# Patient Record
Sex: FEMALE | Race: WHITE | NOT HISPANIC OR LATINO | Employment: OTHER | URBAN - METROPOLITAN AREA
[De-identification: names, ages, dates, MRNs, and addresses within clinical notes are randomized per-mention and may not be internally consistent; named-entity substitution may affect disease eponyms.]

---

## 2017-01-09 ENCOUNTER — PATIENT OUTREACH (OUTPATIENT)
Dept: ADMINISTRATIVE | Facility: HOSPITAL | Age: 72
End: 2017-01-09

## 2017-01-16 ENCOUNTER — TELEPHONE (OUTPATIENT)
Dept: PAIN MEDICINE | Facility: CLINIC | Age: 72
End: 2017-01-16

## 2017-01-16 NOTE — TELEPHONE ENCOUNTER
----- Message from Darlin Newton RT sent at 1/13/2017  9:04 AM CST -----  Contact: Gabrielle (daughter) [phone]   Gabrielle (daughter) [phone], requesting an appt for the pt much sooner than 02/16/2017, referred to by Dr.L. Kline for several spine fx, thanks.

## 2017-01-17 NOTE — TELEPHONE ENCOUNTER
Spoke with patient's daughter. Daughter stated patient has history of compression fractures and is being referred by Dr Kline. Daughter indicated patient last fracture was on 10/16. Verbalized to daughter if patient has increased patient or new fracture to call and staff would fit patient in. Verbalzied understanding. Patient appointment is on 2/16.

## 2017-01-17 NOTE — TELEPHONE ENCOUNTER
----- Message from Apolonia Hayes sent at 1/17/2017  7:58 AM CST -----  Patients daughter Gabrielle is returning Naa's call please call [phone]

## 2017-01-23 ENCOUNTER — OFFICE VISIT (OUTPATIENT)
Dept: FAMILY MEDICINE | Facility: CLINIC | Age: 72
End: 2017-01-23
Payer: MEDICARE

## 2017-01-23 VITALS
WEIGHT: 155.56 LBS | TEMPERATURE: 99 F | HEIGHT: 62 IN | SYSTOLIC BLOOD PRESSURE: 138 MMHG | HEART RATE: 80 BPM | BODY MASS INDEX: 28.63 KG/M2 | DIASTOLIC BLOOD PRESSURE: 84 MMHG

## 2017-01-23 DIAGNOSIS — F41.9 ANXIETY: ICD-10-CM

## 2017-01-23 DIAGNOSIS — R73.03 PREDIABETES: ICD-10-CM

## 2017-01-23 DIAGNOSIS — E55.9 VITAMIN D DEFICIENCY: ICD-10-CM

## 2017-01-23 DIAGNOSIS — Z00.00 ROUTINE HEALTH MAINTENANCE: ICD-10-CM

## 2017-01-23 DIAGNOSIS — S32.029D CLOSED FRACTURE OF SECOND LUMBAR VERTEBRA WITH ROUTINE HEALING, UNSPECIFIED FRACTURE MORPHOLOGY, SUBSEQUENT ENCOUNTER: ICD-10-CM

## 2017-01-23 DIAGNOSIS — J44.9 CHRONIC OBSTRUCTIVE PULMONARY DISEASE, UNSPECIFIED COPD TYPE: Primary | Chronic | ICD-10-CM

## 2017-01-23 PROCEDURE — 99999 PR PBB SHADOW E&M-EST. PATIENT-LVL III: CPT | Mod: PBBFAC,,, | Performed by: FAMILY MEDICINE

## 2017-01-23 PROCEDURE — 1157F ADVNC CARE PLAN IN RCRD: CPT | Mod: S$GLB,,, | Performed by: FAMILY MEDICINE

## 2017-01-23 PROCEDURE — 1160F RVW MEDS BY RX/DR IN RCRD: CPT | Mod: S$GLB,,, | Performed by: FAMILY MEDICINE

## 2017-01-23 PROCEDURE — 1125F AMNT PAIN NOTED PAIN PRSNT: CPT | Mod: S$GLB,,, | Performed by: FAMILY MEDICINE

## 2017-01-23 PROCEDURE — 99214 OFFICE O/P EST MOD 30 MIN: CPT | Mod: S$GLB,,, | Performed by: FAMILY MEDICINE

## 2017-01-23 PROCEDURE — 1159F MED LIST DOCD IN RCRD: CPT | Mod: S$GLB,,, | Performed by: FAMILY MEDICINE

## 2017-01-23 RX ORDER — DIAZEPAM 10 MG/1
10 TABLET ORAL
Qty: 5 TABLET | Refills: 0 | Status: SHIPPED | OUTPATIENT
Start: 2017-01-23 | End: 2017-02-08 | Stop reason: ALTCHOICE

## 2017-01-23 NOTE — PROGRESS NOTES
Subjective:       Patient ID: Aruna Peters is a 71 y.o. female.    Chief Complaint: Follow-up (pt is c/o weight gain. )    HPI   Patient is here today for f/u. She is accompanied to the appt by her daughter, Gabrielle, who provides some interim hx.  Since LOV, patient has gained approx 7#. Gabrielle reports a lot of soda and juice intake, patient denies. Gabrielle notes a lot of snacking, patient denies. We discussed concerns re: prediabetes on last lab draw - due for repeat this spring.  COPD stable by report. Using spiriva daily. She states that she is able to get up and move around as she wants. Still bags the leaves in the yard and takes breaks when needed. Gabrielle notes that she has had pOx vary from 80-92% at home.   Chronic, intermittent LBP with gait and postural change since compression fx at L2 last year. MRI changes were reviewed with patient, who was reticent to even discuss. Adamant that she will not wear a back brace again.    Due for wwe (has not had one in decades) as well as mmg.   Gabrielle can do nuafve71 from home.     Review of Systems   Constitutional: Positive for unexpected weight change (gain). Negative for activity change.   HENT: Negative for hearing loss, rhinorrhea and trouble swallowing.    Eyes: Negative for discharge and visual disturbance.   Respiratory: Positive for wheezing. Negative for cough (occ intermittent) and chest tightness.    Cardiovascular: Positive for chest pain. Negative for palpitations and leg swelling.   Gastrointestinal: Positive for constipation. Negative for abdominal pain, blood in stool, diarrhea and vomiting.   Endocrine: Negative for polydipsia and polyuria.   Genitourinary: Negative for difficulty urinating, dysuria, hematuria and menstrual problem.   Musculoskeletal: Positive for arthralgias and joint swelling.   Neurological: Positive for weakness. Negative for headaches.   Psychiatric/Behavioral: Positive for confusion. Negative for dysphoric mood and  sleep disturbance.       Objective:      Physical Exam   Constitutional: She is oriented to person, place, and time. She appears well-developed and well-nourished. No distress.   HENT:   Head: Normocephalic and atraumatic.   Right Ear: External ear normal.   Left Ear: External ear normal.   Nose: Nose normal.   Eyes: Conjunctivae and EOM are normal.   Neck: Neck supple.   Cardiovascular: Normal rate and regular rhythm.    Pulmonary/Chest: Effort normal. No respiratory distress. She has no wheezes. She has no rales.   distant   Abdominal: She exhibits no distension.   Musculoskeletal: Normal range of motion.   Neurological: She is alert and oriented to person, place, and time. No cranial nerve deficit.   Nl gait   Skin: Skin is warm and dry. No erythema.   Psychiatric: Her speech is normal. Her affect is not angry. She is agitated. She is not aggressive, not slowed and not combative. Cognition and memory are impaired.   Nursing note and vitals reviewed.        Chronic obstructive pulmonary disease, unspecified COPD type  Stable, cont spiriva.  Closed fracture of second lumbar vertebra with routine healing, unspecified fracture morphology, subsequent encounter  F/u with Cal for resultant sciatica.  Routine health maintenance  -     Ambulatory referral to Obstetrics / Gynecology  Update as able.  Anxiety  Side effects and precautions of medication use reviewed with patient, expressed understanding. No questions or concerns.   -     diazePAM (VALIUM) 10 MG Tab; Take 1 tablet (10 mg total) by mouth On call Procedure.  Dispense: 5 tablet; Refill: 0

## 2017-01-23 NOTE — MR AVS SNAPSHOT
[facility]  [address]  [place] [address]  Phone: [phone]  Fax: [phone]                  Aruna Peters   2017 8:20 AM   Office Visit    Description:  Female : 1945   Provider:  Nuzhat Kline MD   Department:  [facility]           Reason for Visit     Follow-up           Diagnoses this Visit        Comments    Chronic obstructive pulmonary disease, unspecified COPD type    -  Primary     Closed fracture of second lumbar vertebra with routine healing, unspecified fracture morphology, subsequent encounter         Routine health maintenance         Anxiety         Vitamin D deficiency         Prediabetes                To Do List           Future Appointments        Provider Department Dept Phone    2017 10:00 AM Jacob Herron MD [facility] - [phone]    2017 8:30 AM Casey Mccall MD [place] - Pain Management [phone]    2017 9:45 AM [facility] MAMMO1 [facility] [phone]      Goals (5 Years of Data)     None      Follow-Up and Disposition     Return in about 4 months (around 2017), or if symptoms worsen or fail to improve, for recheck.    Follow-up and Disposition History       These Medications        Disp Refills Start End    diazePAM (VALIUM) 10 MG Tab 5 tablet 0 2017    Take 1 tablet (10 mg total) by mouth On call Procedure. - Oral    Pharmacy: [facility] [address] [place] [address] AT [facility] Ph #: [phone]         [facility] On Call     [facility] On Call Nurse Care Line -  Assistance  Registered nurses in the [facility] On Call Center provide clinical advisement, health education, appointment booking, and other advisory services.  Call for this free service at [phone].             Medications           Message regarding Medications     Verify the changes and/or additions to your  "medication regime listed below are the same as discussed with your clinician today.  If any of these changes or additions are incorrect, please notify your healthcare provider.        START taking these NEW medications        Refills    diazePAM (VALIUM) 10 MG Tab 0    Sig: Take 1 tablet (10 mg total) by mouth On call Procedure.    Class: Normal    Route: Oral           Verify that the below list of medications is an accurate representation of the medications you are currently taking.  If none reported, the list may be blank. If incorrect, please contact your healthcare provider. Carry this list with you in case of emergency.           Current Medications     acetaminophen (TYLENOL) 500 MG tablet Take 500 mg by mouth every 6 (six) hours as needed for Pain.    albuterol (PROAIR HFA) 90 mcg/actuation inhaler Inhale 2 puffs into the lungs every 6 (six) hours as needed for Wheezing.    docusate sodium (COLACE) 50 MG capsule Take 100 mg by mouth once daily.    escitalopram oxalate (LEXAPRO) 20 MG tablet TAKE 1 TABLET(20 MG) BY MOUTH EVERY EVENING    ibuprofen (ADVIL,MOTRIN) 100 mg/5 mL suspension Take by mouth every 6 (six) hours as needed.    nortriptyline (PAMELOR) 25 MG capsule TAKE 1 CAPSULE(25 MG) BY MOUTH EVERY EVENING    tiotropium bromide (SPIRIVA RESPIMAT) 2.5 mcg/actuation Mist Inhale into the lungs.    diazePAM (VALIUM) 10 MG Tab Take 1 tablet (10 mg total) by mouth On call Procedure.           Clinical Reference Information           Vital Signs - Last Recorded  Most recent update: 1/23/2017  8:35 AM by Lynda Krause LPN    BP Pulse Temp Ht Wt BMI    138/84 80 98.6 °F (37 °C) 5' 2" (1.575 m) 70.5 kg (155 lb 8.6 oz) 28.45 kg/m2      Blood Pressure          Most Recent Value    BP  138/84      Allergies as of 1/23/2017     Iodine And Iodide Containing Products      Immunizations Administered on Date of Encounter - 1/23/2017     None      Orders Placed During Today's Visit      Normal Orders This Visit    " Ambulatory referral to Obstetrics / Gynecology     Future Labs/Procedures Expected by Expires    Comprehensive metabolic panel  1/23/2017 3/24/2018    Hemoglobin A1c  1/23/2017 3/24/2018    TSH  1/23/2017 3/24/2018    Vitamin D  1/23/2017 3/24/2018

## 2017-02-08 ENCOUNTER — OFFICE VISIT (OUTPATIENT)
Dept: OBSTETRICS AND GYNECOLOGY | Facility: CLINIC | Age: 72
End: 2017-02-08
Payer: MEDICARE

## 2017-02-08 VITALS
BODY MASS INDEX: 28.76 KG/M2 | SYSTOLIC BLOOD PRESSURE: 180 MMHG | HEART RATE: 80 BPM | WEIGHT: 156.31 LBS | RESPIRATION RATE: 21 BRPM | DIASTOLIC BLOOD PRESSURE: 90 MMHG | HEIGHT: 62 IN

## 2017-02-08 DIAGNOSIS — Z12.72 NORMAL VAGINAL PAPANICOLAOU SMEAR IN PATIENT WITH HISTORY OF HYSTERECTOMY: Primary | ICD-10-CM

## 2017-02-08 DIAGNOSIS — Z90.710 NORMAL VAGINAL PAPANICOLAOU SMEAR IN PATIENT WITH HISTORY OF HYSTERECTOMY: Primary | ICD-10-CM

## 2017-02-08 PROCEDURE — G0101 CA SCREEN;PELVIC/BREAST EXAM: HCPCS | Mod: S$GLB,,, | Performed by: SPECIALIST

## 2017-02-08 PROCEDURE — 88175 CYTOPATH C/V AUTO FLUID REDO: CPT

## 2017-02-08 PROCEDURE — 99999 PR PBB SHADOW E&M-EST. PATIENT-LVL III: CPT | Mod: PBBFAC,,, | Performed by: SPECIALIST

## 2017-02-08 NOTE — PROGRESS NOTES
72 yo WF presents for annual gyn evaluation. No overt gyn complaints  Past Medical History   Diagnosis Date    Back pain        History reviewed. No pertinent past surgical history.    Family History   Problem Relation Age of Onset    No Known Problems Son        Social History     Social History    Marital status:      Spouse name: N/A    Number of children: N/A    Years of education: N/A     Social History Main Topics    Smoking status: Former Smoker     Packs/day: 1.00    Smokeless tobacco: Never Used    Alcohol use 0.0 oz/week     0 Standard drinks or equivalent per week      Comment: Social    Drug use: None    Sexual activity: Not Asked     Other Topics Concern    None     Social History Narrative    Originally from [place].       Current Outpatient Prescriptions   Medication Sig Dispense Refill    acetaminophen (TYLENOL) 500 MG tablet Take 500 mg by mouth every 6 (six) hours as needed for Pain.      albuterol (PROAIR HFA) 90 mcg/actuation inhaler Inhale 2 puffs into the lungs every 6 (six) hours as needed for Wheezing.      docusate sodium (COLACE) 50 MG capsule Take 100 mg by mouth once daily.      escitalopram oxalate (LEXAPRO) 20 MG tablet TAKE 1 TABLET(20 MG) BY MOUTH EVERY EVENING 30 tablet 0    nortriptyline (PAMELOR) 25 MG capsule TAKE 1 CAPSULE(25 MG) BY MOUTH EVERY EVENING 90 capsule 1    tiotropium bromide (SPIRIVA RESPIMAT) 2.5 mcg/actuation Mist Inhale into the lungs.       No current facility-administered medications for this visit.        Review of patient's allergies indicates:   Allergen Reactions    Iodine and iodide containing products        Review of System:   General: no chills, fever, night sweats, weight gain or weight loss  Psychological: no depression or suicidal ideation  Breasts: no new or changing breast lumps, nipple discharge or masses.  Respiratory: no cough, shortness of breath, or wheezing  Cardiovascular: no chest pain or dyspnea on  exertion  Gastrointestinal: no abdominal pain, change in bowel habits, or black or bloody stools  Genito-Urinary: no incontinence, urinary frequency/urgency or vulvar/vaginal symptoms, pelvic pain or abnormal vaginal bleeding.  Musculoskeletal: no gait disturbance or muscular weakness    PE:   APPEARANCE: Well nourished, well developed, in no acute distress.  NECK: Neck symmetric without masses or thyromegaly.  NODES: No inguinal lymph node enlargement.  ABDOMEN: Soft. No tenderness or masses. No hepatosplenomegaly. No hernias.  BREASTS: Symmetrical, no skin changes or visible lesions. No palpable masses, nipple discharge or adenopathy bilaterally.  PELVIC: Normal external female genitalia without lesions. Normal hair distribution. Adequate perineal body, normal urethral meatus. Vagina moist and well rugated without lesions or discharge. No significant cystocele or rectocele. Uterus and cervix surgically absent. Bimanual exam revealed no masses, tenderness or abnormality.      PAP submitted    Plan BSE monthly   Mammo completed  RTO 2 years/prn

## 2017-02-08 NOTE — MR AVS SNAPSHOT
[facility]  [address]  [facility] [address]  Phone: [phone]                  Luda Peters   2017 8:40 AM   Office Visit    Description:  Female : 1945   Provider:  Clarence Christensen MD   Department:  [facility]           Reason for Visit     Well Woman     Routine Gynecological exam           Diagnoses this Visit        Comments    Normal vaginal Papanicolaou smear in patient with history of hysterectomy    -  Primary            To Do List           Future Appointments        Provider Department Dept Phone    2017 8:30 AM Casey Mccall MD [place] - Pain Management [phone]    2017 9:45 AM [facility] MAMMO1 [facility] [phone]      Goals (5 Years of Data)     None      [facility] On Call     [facility] On Call Nurse Care Line -  Assistance  Registered nurses in the [facility] On Call Center provide clinical advisement, health education, appointment booking, and other advisory services.  Call for this free service at [phone].             Medications           Message regarding Medications     Verify the changes and/or additions to your medication regime listed below are the same as discussed with your clinician today.  If any of these changes or additions are incorrect, please notify your healthcare provider.        STOP taking these medications     diazePAM (VALIUM) 10 MG Tab Take 1 tablet (10 mg total) by mouth On call Procedure.    ibuprofen (ADVIL,MOTRIN) 100 mg/5 mL suspension Take by mouth every 6 (six) hours as needed.           Verify that the below list of medications is an accurate representation of the medications you are currently taking.  If none reported, the list may be blank. If incorrect, please contact your healthcare provider. Carry this list with you in case of emergency.           Current Medications     acetaminophen (TYLENOL) 500 MG tablet Take 500 mg by mouth every 6 (six) hours as needed  "for Pain.    albuterol (PROAIR HFA) 90 mcg/actuation inhaler Inhale 2 puffs into the lungs every 6 (six) hours as needed for Wheezing.    docusate sodium (COLACE) 50 MG capsule Take 100 mg by mouth once daily.    escitalopram oxalate (LEXAPRO) 20 MG tablet TAKE 1 TABLET(20 MG) BY MOUTH EVERY EVENING    nortriptyline (PAMELOR) 25 MG capsule TAKE 1 CAPSULE(25 MG) BY MOUTH EVERY EVENING    tiotropium bromide (SPIRIVA RESPIMAT) 2.5 mcg/actuation Mist Inhale into the lungs.           Clinical Reference Information           Your Vitals Were     BP Pulse Resp Height Weight BMI    180/90 (BP Location: Right arm, Patient Position: Sitting, BP Method: Manual) 80 21 5' 2" (1.575 m) 70.9 kg (156 lb 4.9 oz) 28.59 kg/m2      Blood Pressure          Most Recent Value    BP  (!)  180/90 [Large cuff]      Allergies as of 2/8/2017     Iodine And Iodide Containing Products      Immunizations Administered on Date of Encounter - 2/8/2017     None      Orders Placed During Today's Visit      Normal Orders This Visit    Liquid-based pap smear, screening       Language Assistance Services     ATTENTION: Language assistance services are available, free of charge. Please call [phone].      ATENCIÓN: Si habla allison, tiene a lyons disposición servicios gratuitos de asistencia lingüística. Llame al [phone].     ROCK Ý: N?u b?n nói Ti?ng Vi?t, có các d?ch v? h? tr? ngôn ng? mi?n phí dành cho b?n. G?i s? [phone].         [facility] complies with applicable Federal civil rights laws and does not discriminate on the basis of race, color, national origin, age, disability, or sex.        "

## 2017-02-08 NOTE — LETTER
February 8, 2017      Nuzhat Kline MD  [address]  [place] [address]           [facility]  [address] [facility] [place] [address]  Phone: [phone]          Patient: Luda Peters   MR Number: [id number]   YOB: 1945   Date of Visit: 2/8/2017       Dear Dr. Nuzhat Kline:    Thank you for referring Luda Peters to me for evaluation. Attached you will find relevant portions of my assessment and plan of care.    If you have questions, please do not hesitate to call me. I look forward to following Luda Peters along with you.    Sincerely,    Clarence Christensen MD    Enclosure  CC:  No Recipients    If you would like to receive this communication electronically, please contact [email or web address] or [phone] to request more information on [employer] access.    For providers and/or their staff who would like to refer a patient to [facility], please contact us through our one-stop-shop provider referral line, [facility], at [phone].    If you feel you have received this communication in error or would no longer like to receive these types of communications, please e-mail [email or web address]

## 2017-02-16 ENCOUNTER — HOSPITAL ENCOUNTER (OUTPATIENT)
Dept: RADIOLOGY | Facility: HOSPITAL | Age: 72
Discharge: HOME | End: 2017-02-16
Attending: ANESTHESIOLOGY
Payer: MEDICARE

## 2017-02-16 ENCOUNTER — OFFICE VISIT (OUTPATIENT)
Dept: PAIN MEDICINE | Facility: CLINIC | Age: 72
End: 2017-02-16
Payer: MEDICARE

## 2017-02-16 ENCOUNTER — HOSPITAL ENCOUNTER (OUTPATIENT)
Dept: RADIOLOGY | Facility: HOSPITAL | Age: 72
Discharge: HOME | End: 2017-02-16
Attending: INTERNAL MEDICINE
Payer: MEDICARE

## 2017-02-16 ENCOUNTER — PATIENT MESSAGE (OUTPATIENT)
Dept: FAMILY MEDICINE | Facility: CLINIC | Age: 72
End: 2017-02-16

## 2017-02-16 ENCOUNTER — TELEPHONE (OUTPATIENT)
Dept: PAIN MEDICINE | Facility: CLINIC | Age: 72
End: 2017-02-16

## 2017-02-16 VITALS
SYSTOLIC BLOOD PRESSURE: 140 MMHG | DIASTOLIC BLOOD PRESSURE: 80 MMHG | TEMPERATURE: 98 F | HEIGHT: 63 IN | WEIGHT: 156.5 LBS | HEART RATE: 80 BPM | BODY MASS INDEX: 27.73 KG/M2 | RESPIRATION RATE: 20 BRPM

## 2017-02-16 DIAGNOSIS — M25.552 PAIN OF BOTH HIP JOINTS: Primary | ICD-10-CM

## 2017-02-16 DIAGNOSIS — M48.061 LUMBAR STENOSIS: ICD-10-CM

## 2017-02-16 DIAGNOSIS — M25.551 PAIN OF BOTH HIP JOINTS: ICD-10-CM

## 2017-02-16 DIAGNOSIS — M25.552 PAIN OF BOTH HIP JOINTS: ICD-10-CM

## 2017-02-16 DIAGNOSIS — Z87.81 HISTORY OF VERTEBRAL COMPRESSION FRACTURE: ICD-10-CM

## 2017-02-16 DIAGNOSIS — M51.36 DDD (DEGENERATIVE DISC DISEASE), LUMBAR: ICD-10-CM

## 2017-02-16 DIAGNOSIS — Z12.31 VISIT FOR SCREENING MAMMOGRAM: ICD-10-CM

## 2017-02-16 DIAGNOSIS — M25.551 PAIN OF BOTH HIP JOINTS: Primary | ICD-10-CM

## 2017-02-16 DIAGNOSIS — Z12.31 OTHER SCREENING MAMMOGRAM: ICD-10-CM

## 2017-02-16 PROCEDURE — 1125F AMNT PAIN NOTED PAIN PRSNT: CPT | Mod: S$GLB,,, | Performed by: ANESTHESIOLOGY

## 2017-02-16 PROCEDURE — 1160F RVW MEDS BY RX/DR IN RCRD: CPT | Mod: S$GLB,,, | Performed by: ANESTHESIOLOGY

## 2017-02-16 PROCEDURE — 77067 SCR MAMMO BI INCL CAD: CPT | Mod: TC

## 2017-02-16 PROCEDURE — 77063 BREAST TOMOSYNTHESIS BI: CPT | Mod: 26,,, | Performed by: RADIOLOGY

## 2017-02-16 PROCEDURE — 77067 SCR MAMMO BI INCL CAD: CPT | Mod: 26,,, | Performed by: RADIOLOGY

## 2017-02-16 PROCEDURE — 73521 X-RAY EXAM HIPS BI 2 VIEWS: CPT | Mod: TC,PO

## 2017-02-16 PROCEDURE — 1159F MED LIST DOCD IN RCRD: CPT | Mod: S$GLB,,, | Performed by: ANESTHESIOLOGY

## 2017-02-16 PROCEDURE — 1157F ADVNC CARE PLAN IN RCRD: CPT | Mod: S$GLB,,, | Performed by: ANESTHESIOLOGY

## 2017-02-16 PROCEDURE — 99204 OFFICE O/P NEW MOD 45 MIN: CPT | Mod: S$GLB,,, | Performed by: ANESTHESIOLOGY

## 2017-02-16 PROCEDURE — 99999 PR PBB SHADOW E&M-EST. PATIENT-LVL III: CPT | Mod: PBBFAC,,, | Performed by: ANESTHESIOLOGY

## 2017-02-16 PROCEDURE — 73521 X-RAY EXAM HIPS BI 2 VIEWS: CPT | Mod: 26,,, | Performed by: RADIOLOGY

## 2017-02-16 NOTE — PROGRESS NOTES
This note was completed with dictation software and grammatical errors may exist.    CC: Right leg pain     HPI: The patient is a 71-year-old woman with a history of COPD, multiple lumbar compression fractures, anxiety who presents in referral from Dr. Kline for right leg pain.  The patient states that she has had back pain and right leg pain for about 2 years but seems to be worsening recently.  She is a poor historian, her daughter is present with her today to help with the history.  She seems to state that the pain is in the right thigh, right groin, right buttock but also reports pain radiating all the way down the leg to the foot.  It is worse with walking, worse in the morning, worse at night and worse with getting out of a bed or a chair.  She does get some relief with sitting down, rest and medications.  She is taking Advil and Tylenol with mild relief.  She denies any alyce weakness, no bowel or bladder incontinence.      ROS: She reports fatigability, skin color change, vision change, hoarse voice, chest pain, wheezing and cough, constipation, easy bruising, joint stiffness, joint swelling, back pain, memory loss, difficulty sleeping, anxiety and loss of balance.  Balance of review of systems is negative.    Past Medical History   Diagnosis Date    Back pain        No past surgical history on file.    Social History     Social History    Marital status:      Spouse name: N/A    Number of children: N/A    Years of education: N/A     Social History Main Topics    Smoking status: Former Smoker     Packs/day: 1.00    Smokeless tobacco: Never Used    Alcohol use 0.0 oz/week     0 Standard drinks or equivalent per week      Comment: Social    Drug use: None    Sexual activity: Not Asked     Other Topics Concern    None     Social History Narrative    Originally from [place].         Medications/Allergies: See med card    Vitals:    02/16/17 0832   BP: (!) 140/80   Pulse: 80   Resp: 20   Temp:  "97.5 °F (36.4 °C)   TempSrc: Oral   Weight: 71 kg (156 lb 8 oz)   Height: 5' 3" (1.6 m)   PainSc:   6   PainLoc: Back         Physical exam:  Gen: A and O x3, pleasant, well-groomed  Skin: No rashes or obvious lesions  HEENT: PERRLA, no obvious deformities on ears or in canals. Trachea midline.  CVS: Regular rate and rhythm, normal palpable pulses.  Resp:No increased work of breathing, symmetrical chest rise.  Abdomen: Soft, NT/ND.    Musculoskeletal: Antalgic gait secondary to right leg pain, needs help with walking.  Neuro:  Lower extremities: 5/5 strength bilaterally, except for 4/5 hip flexion right side  Reflexes: Patellar 3+, Achilles 2+ bilaterally.  Sensory:  Intact and symmetrical to light touch and pinprick in L2-S1 dermatomes bilaterally.    Lumbar spine:  Lumbar spine: Range of motion is moderately reduced with both flexion and extension with increased pain in the low back and right buttock with oblique extension.  Jona's test causes no increased pain on either side.    Supine straight leg raise is negative bilaterally.    Internal and external rotation of the hip causes severe right hip, buttock and groin pain.    Myofascial exam: No tenderness to palpation across lumbar paraspinous muscles.    Imaging:  10/13/16 MRI L-spine  1.  Healing relatively severe superior endplate compression fracture of the L2 vertebral body with retropulsion of the posterior vertebral body cortex.  2.  L1-2 severe spinal stenosis from a combination of the L2 vertebral body compression fracture, mild disc bulge, and degenerative facet arthrosis.  3.  L2-3 minimal disc bulge and mild degenerative facet arthrosis.  4.  L3-4 minimal disc bulge.  There is mild anterior L3 subluxation due to degenerative facet arthrosis.  5.  L4-5 moderate spinal stenosis due to degenerative facet arthrosis.  6.  L5-S1 degenerative disc disease and degenerative facet arthrosis.  There is a cystic nodule anterior to the left L5-S1 facet joint " which is most likely a synovial cyst and less likely a meningeal cyst.  The cystic nodule impinges the left-sided nerve roots within the left lateral recess of S1.  7.  Old moderate superior endplate compression fracture of the T11 vertebral body.    9/26/16 Xray L-spine  There is lower thoracic and upper lumbar scoliosis convex to the right.  The scoliotic angle measures 5 degrees from the superior endplate of T12 to the inferior endplate of L4.  There is a superior endplate compression fracture of the T11 vertebral body resulting in a wedge deformity and approximately 30 % loss of vertebral body height.  There is a relatively severe superior endplate compression fracture of the L2 vertebral body resulting in a wedge deformity and approximately 70 % loss of vertebral body height.  There is a superior endplate compression fracture of the L4 vertebral body resulting in a wedge deformity and approximately 30 % loss of vertebral body height.  There is degenerative facet arthrosis at the L5-S1 level resulting in grade 2 anterolisthesis of L5 over distance of approximately 8 mm.  There is degenerative disc disease at L5-S1 level with disc space narrowing.    Assessment:  The patient is a 71-year-old woman with a history of COPD, multiple lumbar compression fractures, anxiety who presents in referral from Dr. Kline for right leg pain.   1. Pain of both hip joints  X-Ray Hips Bilateral 2 View Inc AP Pelvis   2. History of vertebral compression fracture     3. Lumbar stenosis     4. DDD (degenerative disc disease), lumbar           Plan:  1.  While she does have some spinal stenosis at L1/2 and L4/5, the majority of her pain in the right leg seemed to be located in the hip and anterior thigh with severe pain on range of motion of the hip.  I'm going to get an x-ray of the hip and if it looks normal her pain may be coming from ED L1/2 or L4/5 levels.  If she has degenerative hip arthritis I'm going to get her set up with a  hip injection and have her follow-up in several weeks afterwards.      Thank you for referring this interesting patient, and I look forward to continuing to collaborate in her care.

## 2017-02-16 NOTE — TELEPHONE ENCOUNTER
Spoke with Gabrielle patient's daughter. Informed daughter of patient's X-ray results. Verbalized understanding. Order faxed to radiology.

## 2017-02-16 NOTE — TELEPHONE ENCOUNTER
Please of the patient know that I have reviewed her hip x-ray that shows severe degenerative arthritis in her right hip where as the left hip looks totally normal.  I think this is causing her current right leg pain and I'm going to get her set up with a right hip injection.  The radiology Department will call her to get her set up in the next week or so.

## 2017-03-20 RX ORDER — ESCITALOPRAM OXALATE 10 MG/1
TABLET ORAL
Qty: 90 TABLET | Refills: 0 | Status: SHIPPED | OUTPATIENT
Start: 2017-03-20 | End: 2018-06-07

## 2017-03-21 RX ORDER — ESCITALOPRAM OXALATE 20 MG/1
TABLET ORAL
Qty: 90 TABLET | Refills: 0 | Status: SHIPPED | OUTPATIENT
Start: 2017-03-21 | End: 2017-08-02 | Stop reason: SDUPTHER

## 2017-05-22 RX ORDER — NORTRIPTYLINE HYDROCHLORIDE 25 MG/1
CAPSULE ORAL
Qty: 90 CAPSULE | Refills: 0 | Status: SHIPPED | OUTPATIENT
Start: 2017-05-22 | End: 2017-08-17 | Stop reason: SDUPTHER

## 2017-08-02 RX ORDER — ESCITALOPRAM OXALATE 20 MG/1
TABLET ORAL
Qty: 90 TABLET | Refills: 0 | Status: SHIPPED | OUTPATIENT
Start: 2017-08-02 | End: 2017-11-04 | Stop reason: SDUPTHER

## 2017-08-02 RX ORDER — ESCITALOPRAM OXALATE 10 MG/1
TABLET ORAL
Qty: 90 TABLET | Refills: 0 | OUTPATIENT
Start: 2017-08-02

## 2017-08-17 RX ORDER — NORTRIPTYLINE HYDROCHLORIDE 25 MG/1
CAPSULE ORAL
Qty: 90 CAPSULE | Refills: 0 | Status: SHIPPED | OUTPATIENT
Start: 2017-08-17 | End: 2017-11-21 | Stop reason: SDUPTHER

## 2017-08-18 DIAGNOSIS — Z12.11 COLON CANCER SCREENING: ICD-10-CM

## 2017-11-06 RX ORDER — ESCITALOPRAM OXALATE 20 MG/1
TABLET ORAL
Qty: 90 TABLET | Refills: 1 | Status: SHIPPED | OUTPATIENT
Start: 2017-11-06 | End: 2018-05-11 | Stop reason: SDUPTHER

## 2017-11-21 RX ORDER — NORTRIPTYLINE HYDROCHLORIDE 25 MG/1
CAPSULE ORAL
Qty: 90 CAPSULE | Refills: 0 | Status: SHIPPED | OUTPATIENT
Start: 2017-11-21 | End: 2018-02-01 | Stop reason: SDUPTHER

## 2018-02-01 RX ORDER — NORTRIPTYLINE HYDROCHLORIDE 25 MG/1
CAPSULE ORAL
Qty: 30 CAPSULE | Refills: 0 | Status: SHIPPED | OUTPATIENT
Start: 2018-02-01 | End: 2018-03-09 | Stop reason: SDUPTHER

## 2018-03-09 DIAGNOSIS — Z00.00 ROUTINE HEALTH MAINTENANCE: Primary | ICD-10-CM

## 2018-03-09 DIAGNOSIS — R73.03 PREDIABETES: ICD-10-CM

## 2018-03-09 DIAGNOSIS — J44.9 CHRONIC OBSTRUCTIVE PULMONARY DISEASE, UNSPECIFIED COPD TYPE: ICD-10-CM

## 2018-03-12 RX ORDER — NORTRIPTYLINE HYDROCHLORIDE 25 MG/1
CAPSULE ORAL
Qty: 90 CAPSULE | Refills: 0 | Status: SHIPPED | OUTPATIENT
Start: 2018-03-12 | End: 2018-07-05 | Stop reason: SDUPTHER

## 2018-03-12 RX ORDER — NORTRIPTYLINE HYDROCHLORIDE 25 MG/1
CAPSULE ORAL
Qty: 30 CAPSULE | Refills: 0 | Status: SHIPPED | OUTPATIENT
Start: 2018-03-12 | End: 2018-04-08 | Stop reason: SDUPTHER

## 2018-03-13 ENCOUNTER — CLINICAL SUPPORT (OUTPATIENT)
Dept: FAMILY MEDICINE | Facility: CLINIC | Age: 73
End: 2018-03-13
Payer: MEDICARE

## 2018-03-13 DIAGNOSIS — Z12.11 COLON CANCER SCREENING: ICD-10-CM

## 2018-03-13 PROCEDURE — 99999 PR PBB SHADOW E&M-EST. PATIENT-LVL I: CPT | Mod: PBBFAC,,,

## 2018-03-13 NOTE — TELEPHONE ENCOUNTER
Spoke with Gabrielle(daughter) would like to  stool test, will call back after stool test is done to schedule appt, with labs.

## 2018-03-13 NOTE — TELEPHONE ENCOUNTER
----- Message from Lidya Muhammad sent at 3/13/2018  2:32 PM CDT -----  Contact: self  Patient retuning call to Delaney   Please call her back     Thanks

## 2018-03-13 NOTE — TELEPHONE ENCOUNTER
Attempted to contact pt due to med refill, refill sent to pharmacy, pt is due for follow up appt with labs      No answer, left messg. Pt to contact office to schedule appt.

## 2018-03-21 ENCOUNTER — LAB VISIT (OUTPATIENT)
Dept: LAB | Facility: HOSPITAL | Age: 73
End: 2018-03-21
Attending: FAMILY MEDICINE
Payer: MEDICARE

## 2018-03-21 DIAGNOSIS — R73.03 PREDIABETES: ICD-10-CM

## 2018-03-21 DIAGNOSIS — Z00.00 ROUTINE HEALTH MAINTENANCE: ICD-10-CM

## 2018-03-21 LAB
25(OH)D3+25(OH)D2 SERPL-MCNC: 27 NG/ML
ALBUMIN SERPL BCP-MCNC: 3.6 G/DL
ALP SERPL-CCNC: 77 U/L
ALT SERPL W/O P-5'-P-CCNC: 9 U/L
ANION GAP SERPL CALC-SCNC: 10 MMOL/L
AST SERPL-CCNC: 17 U/L
BILIRUB SERPL-MCNC: 0.3 MG/DL
BUN SERPL-MCNC: 14 MG/DL
CALCIUM SERPL-MCNC: 9.6 MG/DL
CHLORIDE SERPL-SCNC: 99 MMOL/L
CO2 SERPL-SCNC: 29 MMOL/L
CREAT SERPL-MCNC: 0.7 MG/DL
EST. GFR  (AFRICAN AMERICAN): >60 ML/MIN/1.73 M^2
EST. GFR  (NON AFRICAN AMERICAN): >60 ML/MIN/1.73 M^2
ESTIMATED AVG GLUCOSE: 117 MG/DL
GLUCOSE SERPL-MCNC: 97 MG/DL
HBA1C MFR BLD HPLC: 5.7 %
POTASSIUM SERPL-SCNC: 4 MMOL/L
PROT SERPL-MCNC: 7.5 G/DL
SODIUM SERPL-SCNC: 138 MMOL/L
TSH SERPL DL<=0.005 MIU/L-ACNC: 1.29 UIU/ML

## 2018-03-21 PROCEDURE — 80053 COMPREHEN METABOLIC PANEL: CPT

## 2018-03-21 PROCEDURE — 82306 VITAMIN D 25 HYDROXY: CPT

## 2018-03-21 PROCEDURE — 83036 HEMOGLOBIN GLYCOSYLATED A1C: CPT

## 2018-03-21 PROCEDURE — 36415 COLL VENOUS BLD VENIPUNCTURE: CPT | Mod: PO

## 2018-03-21 PROCEDURE — 84443 ASSAY THYROID STIM HORMONE: CPT

## 2018-04-08 ENCOUNTER — NURSE TRIAGE (OUTPATIENT)
Dept: ADMINISTRATIVE | Facility: CLINIC | Age: 73
End: 2018-04-08

## 2018-04-08 NOTE — TELEPHONE ENCOUNTER
"  Reason for Disposition   Caller requesting a refill, no triage required, and triager able to refill per unit policy    Answer Assessment - Initial Assessment Questions  1. SYMPTOMS: "Do you have any symptoms?"      *No Answer*  2. SEVERITY: If symptoms are present, ask "Are they mild, moderate or severe?"      *No Answer*    Protocols used: ST MEDICATION QUESTION CALL-A-AH    "

## 2018-04-13 RX ORDER — NORTRIPTYLINE HYDROCHLORIDE 25 MG/1
CAPSULE ORAL
Qty: 30 CAPSULE | Refills: 1 | Status: SHIPPED | OUTPATIENT
Start: 2018-04-13 | End: 2019-03-11 | Stop reason: SDUPTHER

## 2018-05-14 RX ORDER — ESCITALOPRAM OXALATE 20 MG/1
20 TABLET ORAL NIGHTLY
Qty: 30 TABLET | Refills: 0 | Status: SHIPPED | OUTPATIENT
Start: 2018-05-14 | End: 2018-12-13 | Stop reason: SDUPTHER

## 2018-05-24 ENCOUNTER — PATIENT OUTREACH (OUTPATIENT)
Dept: ADMINISTRATIVE | Facility: HOSPITAL | Age: 73
End: 2018-05-24

## 2018-05-24 NOTE — LETTER
June 1, 2018    Luda Peters  [address] [place] [address]             [facility]  [address]  [facility] [address]  Phone: [phone] Dear Mrs. Peters:    We have tried to reach you by mychart unsuccessfully.      [facility] is committed to your overall health.  To help you get the most out of each of your visits, we will review your information to make sure you are up to date on all of your recommended tests and or procedures.       Dr. Nuzhat Kline MD has found that you may be due for:     Fasting cholesterol labs   Tetanus, shingles and pneumonia vaccine   Colon cancer screening     If you have had any of the above done at another facility, please bring the records or information with you so that your record at [facility] will be complete and up to date.     If you have not had any of these tests or procedures done recently and would like to complete this testing before your appointment on 6/7/18 at 8:20 am with Nuzhat Kline MD please call [phone] or send a message through your [facility] portal to your provider's office.     If you are currently taking medication, please bring it with you to your appointment for review.     Also, if you have any type of Advanced Directives, please bring them with you to your office visit so we may scan them into your chart.     Please feel free to call the number listed below if you have any questions.     Thanks,     Anthony Fitzgerald LPN Clinical Care Coordinator   [facility] [address] [place] [address]   [phone] (p)   [phone] (f)      Additional Information   If you have questions, you can email [email or web address] or call [phone]  to talk to our [facility] staff. Remember, [facility] is NOT to be used for urgent needs. For medical emergencies, dial 911.

## 2018-06-07 ENCOUNTER — HOSPITAL ENCOUNTER (OUTPATIENT)
Dept: RADIOLOGY | Facility: HOSPITAL | Age: 73
Discharge: HOME | End: 2018-06-07
Attending: FAMILY MEDICINE
Payer: MEDICARE

## 2018-06-07 ENCOUNTER — OFFICE VISIT (OUTPATIENT)
Dept: ORTHOPEDICS | Facility: CLINIC | Age: 73
End: 2018-06-07
Payer: MEDICARE

## 2018-06-07 ENCOUNTER — OFFICE VISIT (OUTPATIENT)
Dept: FAMILY MEDICINE | Facility: CLINIC | Age: 73
End: 2018-06-07
Payer: MEDICARE

## 2018-06-07 VITALS
DIASTOLIC BLOOD PRESSURE: 82 MMHG | BODY MASS INDEX: 30.39 KG/M2 | HEIGHT: 63 IN | TEMPERATURE: 99 F | SYSTOLIC BLOOD PRESSURE: 142 MMHG | HEART RATE: 80 BPM | WEIGHT: 171.5 LBS

## 2018-06-07 VITALS — WEIGHT: 171 LBS | HEIGHT: 63 IN | BODY MASS INDEX: 30.3 KG/M2

## 2018-06-07 DIAGNOSIS — Z12.11 SCREEN FOR COLON CANCER: ICD-10-CM

## 2018-06-07 DIAGNOSIS — S32.009A LUMBAR VERTERBRAL FRACTURE, TRAUMATIC: ICD-10-CM

## 2018-06-07 DIAGNOSIS — M54.16 LUMBAR RADICULOPATHY: Primary | ICD-10-CM

## 2018-06-07 DIAGNOSIS — M54.31 SCIATICA OF RIGHT SIDE: ICD-10-CM

## 2018-06-07 DIAGNOSIS — M16.11 PRIMARY OSTEOARTHRITIS OF RIGHT HIP: ICD-10-CM

## 2018-06-07 DIAGNOSIS — Z79.899 HIGH RISK MEDICATIONS (NOT ANTICOAGULANTS) LONG-TERM USE: ICD-10-CM

## 2018-06-07 DIAGNOSIS — J44.9 CHRONIC OBSTRUCTIVE PULMONARY DISEASE, UNSPECIFIED COPD TYPE: Chronic | ICD-10-CM

## 2018-06-07 DIAGNOSIS — R73.03 PREDIABETES: ICD-10-CM

## 2018-06-07 DIAGNOSIS — Z00.00 ROUTINE GENERAL MEDICAL EXAMINATION AT A HEALTH CARE FACILITY: Primary | ICD-10-CM

## 2018-06-07 PROCEDURE — 72100 X-RAY EXAM L-S SPINE 2/3 VWS: CPT | Mod: TC,PN

## 2018-06-07 PROCEDURE — 99215 OFFICE O/P EST HI 40 MIN: CPT | Mod: S$GLB,,, | Performed by: FAMILY MEDICINE

## 2018-06-07 PROCEDURE — 99203 OFFICE O/P NEW LOW 30 MIN: CPT | Mod: S$GLB,,, | Performed by: NURSE PRACTITIONER

## 2018-06-07 PROCEDURE — 99999 PR PBB SHADOW E&M-EST. PATIENT-LVL III: CPT | Mod: PBBFAC,,, | Performed by: NURSE PRACTITIONER

## 2018-06-07 PROCEDURE — 99999 PR PBB SHADOW E&M-EST. PATIENT-LVL IV: CPT | Mod: PBBFAC,,, | Performed by: FAMILY MEDICINE

## 2018-06-07 PROCEDURE — 72100 X-RAY EXAM L-S SPINE 2/3 VWS: CPT | Mod: 26,,, | Performed by: RADIOLOGY

## 2018-06-07 PROCEDURE — 73502 X-RAY EXAM HIP UNI 2-3 VIEWS: CPT | Mod: 26,RT,, | Performed by: RADIOLOGY

## 2018-06-07 PROCEDURE — 73502 X-RAY EXAM HIP UNI 2-3 VIEWS: CPT | Mod: TC,PN,RT

## 2018-06-07 RX ORDER — IBUPROFEN 200 MG
200 TABLET ORAL EVERY 6 HOURS PRN
COMMUNITY
End: 2018-12-13 | Stop reason: SDUPTHER

## 2018-06-07 RX ORDER — METHYLPREDNISOLONE 4 MG/1
TABLET ORAL
Qty: 1 PACKAGE | Refills: 0 | Status: SHIPPED | OUTPATIENT
Start: 2018-06-07 | End: 2018-06-28

## 2018-06-07 RX ORDER — ARIPIPRAZOLE 2 MG/1
2 TABLET ORAL DAILY
Qty: 30 TABLET | Refills: 1 | Status: SHIPPED | OUTPATIENT
Start: 2018-06-07 | End: 2018-08-06 | Stop reason: SDUPTHER

## 2018-06-07 NOTE — LETTER
June 7, 2018      Nuzhat Kline MD  [address]  [place] [address]           [facility]  [address] [facility] [address] [place] [address]  Phone: [phone]          Patient: Luda Peters   MR Number: [id number]   YOB: 1945   Date of Visit: 6/7/2018       Dear Dr. Nuzhat Kline:    Thank you for referring Luda Peters to me for evaluation. Attached you will find relevant portions of my assessment and plan of care.    If you have questions, please do not hesitate to call me. I look forward to following Luda Peters along with you.    Sincerely,    Caridad Rivera, APRN    Enclosure  CC:  No Recipients    If you would like to receive this communication electronically, please contact [email or web address] or [phone] to request more information on [place]. [email or web address]. cn Link access.    For providers and/or their staff who would like to refer a patient to [facility], please contact us through our one-stop-shop provider referral line, [facility], at [phone].    If you feel you have received this communication in error or would no longer like to receive these types of communications, please e-mail [email or web address]

## 2018-06-07 NOTE — PROGRESS NOTES
Subjective:       Patient ID: Luda Peters is a 73 y.o. female.    Chief Complaint: Follow-up (6 mo f/u)      Luda Peters is in the office for f/u. She has not been seen in clinic in over 1 year.    HPI  Her history is difficult to elicit, poor chronological historian, scattered details. Patient attempted to review her issues, but changes subject frequently, gets agitated easily.   Per daughter, some statements regarding health, medications, and self-care are simply incorrect.  Past Medical History:   Diagnosis Date    Back pain      Pulm: f/u with knower. Stable, and does not use supplemental oxygen as recommended, but daughter does see her using it more frequently than before. No cough or congestion. Resting pOx 90%, walkig pOx 65%, improved quickly.   Back/Hip: ongoing pain reported from back and hip. Pt declines pain medications. Has no intention of allowing LIN in her back as they hurt significantly previously. We did discuss ortho visit to consider inj to hip as she does not want a joint replacement at this point either.     Separate discussion with daughter. She notes increasing difficulty managing her mom over the past year. Less willing to agree than previous. Sleep is preserved. They are seeing more memory lapses. Pt admittedly self-adjusts and periodically stops meds such as the lexapro - although she states she is currently taking.   We also discussed that as her mom has continually refused routine screenings for preventative health and associated exams, imaging, there is the potential for a metabolic process to be occurring without us aware. Including, but not limited to organ injuries, strokes, heart disease, cancers. Daughter expressed understanding. She states that her mom has and will continue to refuse all treatments and would expect this even if the diagnosis were a cancer. They are both aware that there is a continued risk for unknown medical conditions to contribute to her  current states, some of which may be treatable if addressed early on.  Daughter was told that her mom's lung function is such that her life expectancy is limited to a few years at most at this point as well.    Current Outpatient Prescriptions:     acetaminophen (TYLENOL) 500 MG tablet, Take 1,000 mg by mouth every 6 (six) hours as needed for Pain. , Disp: , Rfl:     albuterol (PROAIR HFA) 90 mcg/actuation inhaler, Inhale 2 puffs into the lungs every 6 (six) hours as needed for Wheezing., Disp: , Rfl:     docusate sodium (COLACE) 50 MG capsule, Take 100 mg by mouth once daily., Disp: , Rfl:     escitalopram oxalate (LEXAPRO) 20 MG tablet, Take 1 tablet (20 mg total) by mouth every evening. Please keep your upcoming appt for additional refills., Disp: 30 tablet, Rfl: 0    fluticasone furoate (ARNUITY ELLIPTA INHL), Inhale into the lungs., Disp: , Rfl:     ibuprofen (ADVIL,MOTRIN) 200 MG tablet, Take 200 mg by mouth every 6 (six) hours as needed for Pain., Disp: , Rfl:     nortriptyline (PAMELOR) 25 MG capsule, TAKE 1 CAPSULE( 25MG) BY MOUTH EVERY EVENING, Disp: 90 capsule, Rfl: 0    nortriptyline (PAMELOR) 25 MG capsule, TAKE ONE CAPSULE BY MOUTH EVERY EVENING, Disp: 30 capsule, Rfl: 1    escitalopram oxalate (LEXAPRO) 10 MG tablet, TAKE 1 TABLET BY MOUTH EVERY EVENING, Disp: 90 tablet, Rfl: 0    tiotropium bromide (SPIRIVA RESPIMAT) 2.5 mcg/actuation Mist, Inhale into the lungs., Disp: , Rfl:     The ASCVD Risk score (Mellwood DC Jr., et al., 2013) failed to calculate for the following reasons:    Cannot find a previous HDL lab    Cannot find a previous total cholesterol lab     Lab Results   Component Value Date    HGBA1C 5.7 (H) 03/21/2018    HGBA1C 5.9 02/16/2017    HGBA1C 5.9 10/01/2016     Lab Results   Component Value Date    CREATININE 0.7 03/21/2018   Labs 2018 rev.    Review of Systems   Unable to perform ROS: Psychiatric disorder   Constitutional: Positive for unexpected weight change (gain).  Negative for activity change and fatigue.   HENT: Negative for rhinorrhea and trouble swallowing.    Eyes: Negative for discharge and visual disturbance.   Respiratory: Positive for shortness of breath (pt denies, but daughter has seen her mom use supplemental O2 available at home more frequently). Negative for cough (occ intermittent), chest tightness and wheezing.    Cardiovascular: Positive for leg swelling. Negative for chest pain and palpitations.   Gastrointestinal: Positive for constipation. Negative for abdominal pain, blood in stool and diarrhea.   Endocrine: Negative for polydipsia and polyuria.   Genitourinary: Negative for difficulty urinating and hematuria.   Musculoskeletal: Positive for arthralgias. Negative for joint swelling.   Neurological: Positive for weakness. Negative for headaches.   Psychiatric/Behavioral: Positive for agitation and confusion. Negative for dysphoric mood and sleep disturbance. The patient is nervous/anxious.        Objective:      Physical Exam   Constitutional: She is oriented to person, place, and time. She appears well-developed and well-nourished. No distress.   HENT:   Head: Normocephalic and atraumatic.   Right Ear: External ear normal.   Left Ear: External ear normal.   Nose: Nose normal.   Mouth/Throat: Oropharynx is clear and moist. No oropharyngeal exudate.   Eyes: Conjunctivae and EOM are normal. Pupils are equal, round, and reactive to light.   Neck: Normal range of motion. Neck supple. No thyromegaly present.   Cardiovascular: Normal rate and regular rhythm.    Pulmonary/Chest: Effort normal and breath sounds normal. No respiratory distress. She has no wheezes. She has no rales.   distant   Abdominal: Soft. Bowel sounds are normal. She exhibits no distension and no mass. There is no tenderness. There is no rebound and no guarding.   Musculoskeletal: She exhibits no edema.        Right hip: She exhibits decreased range of motion, decreased strength and bony  tenderness.   Visible discomfort when standing from seated position. Pain attributed to R hip.   Lymphadenopathy:     She has no cervical adenopathy.   Neurological: She is alert and oriented to person, place, and time. No cranial nerve deficit.   Nl gait   Skin: Skin is warm and dry. No erythema.   Psychiatric: Her mood appears anxious. Her affect is not angry. Her speech is rapid and/or pressured and tangential. She is agitated. She is not aggressive (argumentative with daughter), not slowed and not combative. Cognition and memory are impaired.   Nursing note and vitals reviewed.      xrays were reviewed in the room with pt and daughter.    Screening recommendations appropriate to age and health status were reviewed.    Routine general medical examination at a health care facility  Anticipatory recommendations were reviewed.  Chronic obstructive pulmonary disease, unspecified COPD type  Per pulm. Encouraged supplemental O2 use.  Sciatica of right side  Update imaging. Pt declines any LIN discussions, but is open to seeing ortho re: her hip.  Screen for colon cancer  Fit kit in process.  Lumbar verterbral fracture, traumatic  -     X-Ray Hip 2 View Right; Future; Expected date: 06/07/2018  -     X-Ray Lumbar Spine AP And Lateral; Future; Expected date: 06/07/2018  For review. Prev stable.  Prediabetes  -     Comprehensive metabolic panel; Future; Expected date: 06/07/2018  Stable A1c. Will follow.  High risk medications (not anticoagulants) long-term use  -     Lipid panel; Future; Expected date: 06/07/2018  Primary osteoarthritis of right hip  -     Ambulatory referral to Orthopedics  Reviewed images in office. As pt is not a surgical candidate, consider cortisone inj for some relief. They decline narcotics at this time.  Mood disorder  -     ARIPiprazole (ABILIFY) 2 MG Tab; Take 1 tablet (2 mg total) by mouth once daily.  Dispense: 30 tablet; Refill: 1  Pt shows some positive traits of schizophrenia, although she  does not meet enough for dsm; also narcissicism. Discussed options in management with psych who recommends abilify trial. Can consider also risperdal 1 or seroquel 25-50.  Side effects and precautions of medication use incl stroke risk were reviewed with patient's daughter, expressed understanding. No questions or concerns.  Med recommendation was reviewed with patient's daughter Gabrielle, who agreed with the plan as above.    Time spent in room on history, exam, and coordination of care with patient >40mins.
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
## 2018-06-14 ENCOUNTER — TELEPHONE (OUTPATIENT)
Dept: FAMILY MEDICINE | Facility: CLINIC | Age: 73
End: 2018-06-14

## 2018-06-14 RX ORDER — ESCITALOPRAM OXALATE 20 MG/1
TABLET ORAL
Qty: 90 TABLET | Refills: 1 | Status: SHIPPED | OUTPATIENT
Start: 2018-06-14 | End: 2018-12-17 | Stop reason: SDUPTHER

## 2018-06-27 DIAGNOSIS — Z12.11 COLON CANCER SCREENING: ICD-10-CM

## 2018-07-05 RX ORDER — NORTRIPTYLINE HYDROCHLORIDE 25 MG/1
CAPSULE ORAL
Qty: 90 CAPSULE | Refills: 0 | Status: SHIPPED | OUTPATIENT
Start: 2018-07-05 | End: 2018-12-13 | Stop reason: SDUPTHER

## 2018-07-11 ENCOUNTER — TELEPHONE (OUTPATIENT)
Dept: FAMILY MEDICINE | Facility: CLINIC | Age: 73
End: 2018-07-11

## 2018-07-11 NOTE — TELEPHONE ENCOUNTER
----- Message from Paulo Ledesma sent at 7/11/2018 10:06 AM CDT -----  Contact: Rosa Elena with [employer]  Rosa Elena with [employer], [phone], fax [phone]. Requesting orders to go into the home to perform a bone density scan. Please advise. Thanks.

## 2018-07-18 ENCOUNTER — TELEPHONE (OUTPATIENT)
Dept: FAMILY MEDICINE | Facility: CLINIC | Age: 73
End: 2018-07-18

## 2018-07-18 DIAGNOSIS — Z78.0 POSTMENOPAUSAL STATE: Primary | ICD-10-CM

## 2018-07-25 ENCOUNTER — TELEPHONE (OUTPATIENT)
Dept: PAIN MEDICINE | Facility: CLINIC | Age: 73
End: 2018-07-25

## 2018-07-25 NOTE — TELEPHONE ENCOUNTER
Spoke with patient's daughter. Patient is scheduled to see you on Friday. Patient was seen by Caridad Rivera for hip pain in June however she thinks her pain is more lumbar related. The daughter was under the impression patient was coming in on friday for a lumber injection. She was seen by you in february 2017 and her daughter stated she is having the same right leg pain only now this is more intense. Daughter wanted me to ask if we can just schedule injection instead of her coming back in for another appointment. Please advise. Thanks.

## 2018-07-25 NOTE — TELEPHONE ENCOUNTER
Sure, I will set her up for a right L4/5 transforaminal injection to see if it helps the leg pain.  She needs to follow-up with me in 3 weeks after the injection.

## 2018-07-27 DIAGNOSIS — M54.16 LUMBAR RADICULOPATHY: Primary | ICD-10-CM

## 2018-07-27 RX ORDER — ALPRAZOLAM 0.5 MG/1
1 TABLET, ORALLY DISINTEGRATING ORAL ONCE AS NEEDED
Status: CANCELLED | OUTPATIENT
Start: 2018-08-17 | End: 2018-08-17

## 2018-07-27 NOTE — TELEPHONE ENCOUNTER
Spoke with patient's daughter Gabrielle. Procedure has been scheduled for 8/17 and she will call back to schedule a follow up once she knows her work schedule. We reviewed pre-op instructions and they were mailed to patient's address. She asked if it is ok for the patient to take one of her 10mg valium prior to the procedure because she gets very anxious when going to the doctor and especially when having procedures done. She was made aware that the patient will be given the 1mg alprazolam when she arrives for the procedure. Please advise on this.

## 2018-07-27 NOTE — TELEPHONE ENCOUNTER
That's fine if she wants to take the Valium that she already has, that is a fairly high dose, is that normally what she takes?.  If so, that's fine if she takes that.

## 2018-08-06 RX ORDER — ARIPIPRAZOLE 2 MG/1
2 TABLET ORAL DAILY
Qty: 90 TABLET | Refills: 1 | Status: SHIPPED | OUTPATIENT
Start: 2018-08-06 | End: 2018-12-22 | Stop reason: SDUPTHER

## 2018-08-08 ENCOUNTER — TELEPHONE (OUTPATIENT)
Dept: PAIN MEDICINE | Facility: CLINIC | Age: 73
End: 2018-08-08

## 2018-08-16 DIAGNOSIS — M51.36 DDD (DEGENERATIVE DISC DISEASE), LUMBAR: Primary | ICD-10-CM

## 2018-08-17 ENCOUNTER — HOSPITAL ENCOUNTER (OUTPATIENT)
Dept: RADIOLOGY | Facility: HOSPITAL | Age: 73
Discharge: HOME | End: 2018-08-17
Attending: ANESTHESIOLOGY | Admitting: ANESTHESIOLOGY
Payer: MEDICARE

## 2018-08-17 ENCOUNTER — HOSPITAL ENCOUNTER (OUTPATIENT)
Facility: HOSPITAL | Age: 73
Discharge: HOME | End: 2018-08-17
Attending: ANESTHESIOLOGY | Admitting: ANESTHESIOLOGY
Payer: MEDICARE

## 2018-08-17 VITALS
SYSTOLIC BLOOD PRESSURE: 157 MMHG | HEIGHT: 62 IN | HEART RATE: 83 BPM | WEIGHT: 165 LBS | BODY MASS INDEX: 30.36 KG/M2 | DIASTOLIC BLOOD PRESSURE: 67 MMHG | TEMPERATURE: 98 F | RESPIRATION RATE: 17 BRPM | OXYGEN SATURATION: 90 %

## 2018-08-17 DIAGNOSIS — M54.16 LUMBAR RADICULOPATHY: Primary | ICD-10-CM

## 2018-08-17 DIAGNOSIS — M51.36 DDD (DEGENERATIVE DISC DISEASE), LUMBAR: ICD-10-CM

## 2018-08-17 PROCEDURE — 25500020 PHARM REV CODE 255: Mod: PO | Performed by: ANESTHESIOLOGY

## 2018-08-17 PROCEDURE — A9579 GAD-BASE MR CONTRAST NOS,1ML: HCPCS | Mod: PO | Performed by: ANESTHESIOLOGY

## 2018-08-17 PROCEDURE — 25000003 PHARM REV CODE 250: Mod: PO | Performed by: ANESTHESIOLOGY

## 2018-08-17 PROCEDURE — 76000 FLUOROSCOPY <1 HR PHYS/QHP: CPT | Mod: TC,PO

## 2018-08-17 PROCEDURE — 64483 NJX AA&/STRD TFRM EPI L/S 1: CPT | Mod: RT,,, | Performed by: ANESTHESIOLOGY

## 2018-08-17 PROCEDURE — 64483 NJX AA&/STRD TFRM EPI L/S 1: CPT | Mod: PO | Performed by: ANESTHESIOLOGY

## 2018-08-17 PROCEDURE — 63600175 PHARM REV CODE 636 W HCPCS: Mod: PO | Performed by: ANESTHESIOLOGY

## 2018-08-17 RX ORDER — ALBUTEROL SULFATE 1.25 MG/3ML
1.25 SOLUTION RESPIRATORY (INHALATION) EVERY 6 HOURS PRN
COMMUNITY
End: 2018-12-13

## 2018-08-17 RX ORDER — LIDOCAINE HYDROCHLORIDE 20 MG/ML
INJECTION, SOLUTION EPIDURAL; INFILTRATION; INTRACAUDAL; PERINEURAL
Status: DISCONTINUED | OUTPATIENT
Start: 2018-08-17 | End: 2018-08-17 | Stop reason: HOSPADM

## 2018-08-17 RX ORDER — METHYLPREDNISOLONE ACETATE 40 MG/ML
INJECTION, SUSPENSION INTRA-ARTICULAR; INTRALESIONAL; INTRAMUSCULAR; SOFT TISSUE
Status: DISCONTINUED | OUTPATIENT
Start: 2018-08-17 | End: 2018-08-17 | Stop reason: HOSPADM

## 2018-08-17 RX ORDER — DIAZEPAM 10 MG/1
20 TABLET ORAL
COMMUNITY
End: 2018-09-13 | Stop reason: SDUPTHER

## 2018-08-17 RX ORDER — BUPIVACAINE HYDROCHLORIDE 2.5 MG/ML
INJECTION, SOLUTION EPIDURAL; INFILTRATION; INTRACAUDAL
Status: DISCONTINUED | OUTPATIENT
Start: 2018-08-17 | End: 2018-08-17 | Stop reason: HOSPADM

## 2018-08-17 RX ORDER — ALPRAZOLAM 0.5 MG/1
1 TABLET, ORALLY DISINTEGRATING ORAL ONCE AS NEEDED
Status: DISCONTINUED | OUTPATIENT
Start: 2018-08-17 | End: 2018-08-17 | Stop reason: HOSPADM

## 2018-08-17 NOTE — PLAN OF CARE
Patient is being driven home by her daughter. She was encouraged to use her home oxygen. She doesn't want anything to drink at this time. She was able to walk to the wheelchair and brought to the car by nurse.

## 2018-08-17 NOTE — DISCHARGE INSTRUCTIONS
Anesthesia information    Anesthesia Safety      You have been given medicine  to sedate you during your procedure today. This may have included both a pain medicine and sleeping medicine. Most of the effects have worn off; however, you may continue to have some drowsiness for the next  24 hours. Anesthesia and pain medicines can cause nausea, sleepiness, dizziness and  constipation.    HOME CARE:  1) For the next EIGHT HOURS, you should be watched by a responsible adult to look for any worsening of your condition.  2) DO NOT DRINK any ALCOHOL for the next 24 HOURS.  3) DO NOT DRIVE or operate dangerous machinery during the next 24 HOURS.  FOLLOW UP with your doctor or this facility if you are not alert and back to your usual level of activity within 24 hrs.  GET PROMPT MEDICAL ATTENTION if any of the following occur:  -- Increased drowsiness  -- Increased weakness or dizziness  -- Repeated vomiting  -- If you cannot be awakened    Home care instructions  Apply ice pack to the injection site for 20 minutes periods for the first 24 hrs for soreness/discomfort at injection site DO NOT USE HEAT FOR 24 HOURS  Keep site clean and dry for 24 hours, remove bandaid when desired  Do not drive until tomorrow  Take care when walking after a lumbar injection  Avoid strenuous activities for 2 days  Make take 2 weeks to feel the full effects   Resume home medication as prescribed today  Resume Aspirin, Plavix, or Coumadin the day after the procedure unless otherwise instructed.    SEE IMMEDIATE MEDICAL HELP FOR:  Severe increase in your usual pain or appearance of new pain  Prolonged or increasing weakness or numbness in the legs or arms  Drainage, redness, active bleeding, or increased swelling at the injection site  Temperature over 100.0 degrees F.  Headache that increases when your head is upright and decreases when you lie flat    CALL 911 OR GO DIRECTLY TO EMERGENCY DEPARTMENT FOR:  Shortness of breath, chest pain, or  problems breathing

## 2018-08-17 NOTE — H&P
"CC: Back pain    HPI: The patient is a 72yo woman with a history of lumbar radiculopathy here for right L4/5 TFESI. There are no major changes in history and physical from 6/7/18.    Past Medical History:   Diagnosis Date    Anxiety 9/26/2016    Back pain     Closed fracture of second lumbar vertebra with routine healing 12/7/2015    COPD (chronic obstructive pulmonary disease) 9/26/2016    Emphysema of lung        Past Surgical History:   Procedure Laterality Date    HYSTERECTOMY         Family History   Problem Relation Age of Onset    No Known Problems Son        Social History     Socioeconomic History    Marital status:      Spouse name: None    Number of children: None    Years of education: None    Highest education level: None   Social Needs    Financial resource strain: None    Food insecurity - worry: None    Food insecurity - inability: None    Transportation needs - medical: None    Transportation needs - non-medical: None   Occupational History    None   Tobacco Use    Smoking status: Former Smoker     Packs/day: 1.00     Last attempt to quit: 10/26/2014     Years since quitting: 3.8    Smokeless tobacco: Never Used   Substance and Sexual Activity    Alcohol use: Yes     Alcohol/week: 0.0 oz     Comment: Social    Drug use: No    Sexual activity: None   Other Topics Concern    None   Social History Narrative    Originally from [place].       Current Facility-Administered Medications   Medication Dose Route Frequency Provider Last Rate Last Dose    alprazolam ODT dissolvable tablet 1 mg  1 mg Oral Once PRN Casey Mccall MD           Review of patient's allergies indicates:   Allergen Reactions    Iodine and iodide containing products        Vitals:    08/16/18 1336 08/17/18 1432   BP:  (!) 140/65   Pulse:  82   Resp:  20   Temp:  97.2 °F (36.2 °C)   TempSrc:  Skin   SpO2:  (!) 92%   Weight: 74.8 kg (165 lb)    Height: 5' 2" (1.575 m)        REVIEW OF " SYSTEMS:     GENERAL: No weight loss, malaise or fevers.  HEENT:  No recent changes in vision or hearing  NECK: Negative for lumps, no difficulty with swallowing.  RESPIRATORY: Negative for cough, wheezing or shortness of breath, patient denies any recent URI.  CARDIOVASCULAR: Negative for chest pain, leg swelling or palpitations.  GI: Negative for abdominal discomfort, blood in stools or black stools or change in bowel habits.  MUSCULOSKELETAL: See HPI.  SKIN: Negative for lesions, rash, and itching.  PSYCH: No suicidal or homicidal ideations, no current mood disturbances.  HEMATOLOGY/LYMPHOLOGY: Negative for prolonged bleeding, bruising easily or swollen nodes. Patient is not currently taking any anti-coagulants  ENDO: No history of diabetes or thyroid dysfunction  NEURO: No history of syncope, paralysis, seizures or tremors.All other reviewed and negative other than HPI.    Physical exam:  Gen: A and O x3, pleasant, well-groomed  Skin: No rashes or obvious lesions  HEENT: PERRLA, no obvious deformities on ears or in canals. No thyroid masses, trachea midline, no palpable lymph nodes in neck, axilla.  CVS: Regular rate and rhythm, normal S1 and S2, no murmurs.  Resp: Clear to auscultation bilaterally.  Abdomen: Soft, NT/ND, normal bowel sounds present.  Musculoskeletal/Neuro: Moving all extremities    Assessment:  Lumbar radiculopathy  -     Case Request Operating Room: Injection,steroid,epidural,transforaminal approach L4/5  -     Place in Outpatient; Standing  -     Diet NPO; Standing  -     alprazolam ODT dissolvable tablet 1 mg; Take 2 tablets (1 mg total) by mouth once as needed for Anxiety.  -     Notify physician ; Standing  -     Notify physician ; Standing  -     Notify physician (specify); Standing  -     Verify informed consent; Standing  -     Vital signs; Standing

## 2018-08-17 NOTE — DISCHARGE SUMMARY
[facility]  Discharge Note  Short Stay    Admit Date: 8/17/2018    Discharge Date: 8/17/2018    Attending Physician: Casey Mccall MD     Discharge Provider: Casey Mccall    Diagnoses:  Active Hospital Problems    Diagnosis  POA    *Lumbar radiculopathy [M54.16]  Yes      Resolved Hospital Problems   No resolved problems to display.       Discharged Condition: good    Final Diagnoses: Lumbar radiculopathy [M54.16]    Disposition: Home or Self Care    Hospital Course: no complications, uneventful    Outcome of Hospitalization, Treatment, Procedure, or Surgery:  Patient was admitted for outpatient procedure. The patient underwent procedure without complications and are discharged home    Follow up/Patient Instructions:  Follow up as scheduled in Pain Management clinic in 3-4 weeks/Patient has received instructions and follow up date and time    Medications:  Continue previous medications    Discharge Procedure Orders   Call MD for:  temperature >100.4     Call MD for:  severe uncontrolled pain     Call MD for:  redness, tenderness, or signs of infection (pain, swelling, redness, odor or green/yellow discharge around incision site)     Call MD for:  severe persistent headache     No dressing needed         Discharge Procedure Orders (must include Diet, Follow-up, Activity):   Discharge Procedure Orders (must include Diet, Follow-up, Activity)   Call MD for:  temperature >100.4     Call MD for:  severe uncontrolled pain     Call MD for:  redness, tenderness, or signs of infection (pain, swelling, redness, odor or green/yellow discharge around incision site)     Call MD for:  severe persistent headache     No dressing needed

## 2018-08-17 NOTE — OP NOTE
PROCEDURE DATE: 8/17/2018    PROCEDURE: Right L4/5 transforaminal epidural steroid injection under fluoroscopy    DIAGNOSIS: Lumbar  Radiculopathy    Post op diagnosis: Same    PHYSICIAN: Casey Mccall MD    MEDICATIONS INJECTED:  Methylprednisolone 40mg (0.5ml) and 1.5ml 0.25% bupivicaine at each nerve root.     LOCAL ANESTHETIC INJECTED:  Lidocaine 1%. 4 ml per site.    SEDATION MEDICATIONS: none    ESTIMATED BLOOD LOSS:  none    COMPLICATIONS:  none    TECHNIQUE:   A time-out was taken to identify patient and procedure side prior to starting the procedure. The patient was placed in a prone position, prepped and draped in the usual sterile fashion using ChloraPrep and sterile towels.  The area to be injected was determined under fluoroscopic guidance in AP and oblique view.  Local anesthetic was given by raising a wheal and going down to the hub of a 25-gauge 1.5 inch needle.  In oblique view, a 3.5 inch 22-gauge bent-tip spinal needle was introduced towards 6 oclock position of the pedicle of each above named nerve root level.  The needle was walked medially then hinged into the neural foramen and position was confirmed in AP and lateral views.  Omnipaque contrast dye was injected to confirm appropriate placement and that there was no vascular uptake.  After negative aspiration for blood or CSF, the medication was then injected. This was performed at the right L4/5 level(s). The patient tolerated the procedure well.    The patient was monitored after the procedure.  Patient was given post procedure and discharge instructions to follow at home. The patient was discharged in a stable condition.

## 2018-09-13 DIAGNOSIS — F41.9 ANXIETY: Primary | ICD-10-CM

## 2018-09-13 RX ORDER — DIAZEPAM 10 MG/1
20 TABLET ORAL NIGHTLY PRN
Qty: 30 TABLET | Refills: 0 | Status: SHIPPED | OUTPATIENT
Start: 2018-09-13 | End: 2022-02-25

## 2018-09-13 NOTE — TELEPHONE ENCOUNTER
Patient called requesting refill on medication to preferred pharmacy. Last Office Visit (date: 6/7/18)  Next Office Visit (date: N/A) . Please advise.

## 2018-09-13 NOTE — TELEPHONE ENCOUNTER
----- Message from Nelly Massey sent at 9/13/2018  7:56 AM CDT -----  Contact: daughter-Gabrielle Zamudio  Needs refill on diazePAM (VALIUM) 10 MG Tab due to daughter/caregiver going out of town and the storms coming up. States the valium will need prior auth.  Caregiver's daughter will be taking care of the patient.  Please call back at  [phone] work or  [facility] [address] [place] [address] AT SEC OF [facility] & [address] [address]  Phone: [phone] Fax: [phone]

## 2018-09-26 ENCOUNTER — TELEPHONE (OUTPATIENT)
Dept: FAMILY MEDICINE | Facility: CLINIC | Age: 73
End: 2018-09-26

## 2018-09-26 PROBLEM — F17.200 TOBACCO DEPENDENCE SYNDROME: Status: ACTIVE | Noted: 2018-09-26

## 2018-09-26 NOTE — TELEPHONE ENCOUNTER
Attempted contact pt's daughter Gabrielle, left message to call us back. More information is needed on the request for the bubble humidifier container.

## 2018-09-26 NOTE — TELEPHONE ENCOUNTER
----- Message from Nelly Massey sent at 9/26/2018  1:00 PM CDT -----  Contact: daughter-Gabrielle Zamudio  Needs script for a bubble humidifier container. Please call back at  fax for [facility] .

## 2018-10-01 ENCOUNTER — TELEPHONE (OUTPATIENT)
Dept: FAMILY MEDICINE | Facility: CLINIC | Age: 73
End: 2018-10-01

## 2018-10-01 NOTE — TELEPHONE ENCOUNTER
----- Message from Syl Hylton sent at 10/1/2018 12:49 PM CDT -----  Contact: pt's daughter darwin [phone]            Name of Who is Calling: pt's daughter      What is the request in detail: returning the nurse's phone call. Call daughter      Can the clinic reply by [facility]: no      What Number to Call Back if not in [facility]: pt's daughter darwin [phone]

## 2018-11-14 ENCOUNTER — TELEPHONE (OUTPATIENT)
Dept: FAMILY MEDICINE | Facility: CLINIC | Age: 73
End: 2018-11-14

## 2018-11-14 NOTE — TELEPHONE ENCOUNTER
Spoke to Sharron Roy with [facility]. Sharron states patient has been discharged from a Skilled Nursing Facility and will need a follow up appointment. Sharron has done telephone interview with patient and daughter already. Patient's daughter is unabel to bring patient in for appointment until closer to new year. Sharron has advised that a [facility] Nurse Practitioner can go to patient's home to do evaluation unless you prefer to see patient in clinic. If you require in clinic visit, patient's daughter stated she will see what she can do to get her in. Please advise

## 2018-11-16 NOTE — TELEPHONE ENCOUNTER
Sent IM to Sharron Roy informing to have [facility] Nurse Practitioner perform in-home evaluation as patient has not been able to make it to clinic for SNF follow up

## 2018-11-26 ENCOUNTER — TELEPHONE (OUTPATIENT)
Dept: FAMILY MEDICINE | Facility: CLINIC | Age: 73
End: 2018-11-26

## 2018-11-26 NOTE — TELEPHONE ENCOUNTER
----- Message from Cordelia Deras sent at 11/26/2018  3:23 PM CST -----  Contact: Gabrielle Zamudio (Daughter)  Type:  Patient Returning Call    Who Called:  Gabrielle Zamudio (Daughter)  Who Left Message for Patient:  Lynda  Does the patient know what this is regarding?:  no  Best Call Back Number:  Gabrielle at   Additional Information:  Call to pod. No answer.

## 2018-11-26 NOTE — TELEPHONE ENCOUNTER
Pt needs an appt later in the afternoon around a 2:20 pm or later if possible w/i next two weeks. Please review and advise.

## 2018-11-26 NOTE — TELEPHONE ENCOUNTER
----- Message from Rosa Elena Manriquez sent at 11/23/2018  3:27 PM CST -----  Contact: self  Daughter - Gabrielle Zamudio - [phone] is calling to schedule a hospital followup appt from [facility] on 10 11 18 for Anemia/CHF and COPD/I do not show any appts until almost the middle of January 2019/please advise

## 2018-11-26 NOTE — TELEPHONE ENCOUNTER
----- Message from Lucero Wallis sent at 11/23/2018  4:54 PM CST -----  Contact: jessica from [employer]  jessica from [employer] need to speak to nurse regarding scheduling a 40 min f/u appt per jessica from [employer] and patient daughter want the lastest appt in the afternoon per jessica from [employer]    Epic earliest is 1/16/2019    Please call daughter Gabrielle at [phone]

## 2018-11-27 NOTE — TELEPHONE ENCOUNTER
Left message for pt to call clinic. Wanted to advise of Dr. Kline's late clinic on 12/13 and to possibly schedule appointment.

## 2018-11-27 NOTE — TELEPHONE ENCOUNTER
----- Message from Francia Bhatt sent at 11/27/2018  3:43 PM CST -----   Type:  Patient Returning Call    Who Called:  Pt  Daughter   darwin  Who Left Message for Patient:  epifanio  Does the patient know what this is regarding?:   About     Fitted  Pt  in  Best Call Back Number:  [phone]  Placed a call to pod

## 2018-11-29 ENCOUNTER — TELEPHONE (OUTPATIENT)
Dept: FAMILY MEDICINE | Facility: CLINIC | Age: 73
End: 2018-11-29

## 2018-11-29 NOTE — TELEPHONE ENCOUNTER
Received fax from [facility] regarding Rx Acetylcysteine 10% which states is different from on hand bottle which is 20 %. Our records do not indicate that we have sent in this Rx and patient's pharmacy does not have record. Please find out who filled this and what strength it should be, also which pharmacy is she using?

## 2018-11-30 DIAGNOSIS — S32.029D CLOSED FRACTURE OF SECOND LUMBAR VERTEBRA WITH ROUTINE HEALING, UNSPECIFIED FRACTURE MORPHOLOGY, SUBSEQUENT ENCOUNTER: Primary | ICD-10-CM

## 2018-11-30 NOTE — TELEPHONE ENCOUNTER
Spoke with patient's daughter, enough medication on hand to last until appt on 12/13. Will discuss refills then

## 2018-12-07 RX ORDER — CLOPIDOGREL BISULFATE 75 MG/1
75 TABLET ORAL DAILY
Qty: 30 TABLET | Refills: 0 | Status: SHIPPED | OUTPATIENT
Start: 2018-12-07 | End: 2018-12-26 | Stop reason: SDUPTHER

## 2018-12-07 RX ORDER — CLOPIDOGREL BISULFATE 75 MG/1
75 TABLET ORAL DAILY
COMMUNITY
End: 2018-12-07 | Stop reason: SDUPTHER

## 2018-12-07 NOTE — TELEPHONE ENCOUNTER
----- Message from Cordelia Deras sent at 12/7/2018  8:12 AM CST -----  Contact: Gabrielle Zamudio (Daughter)  Type:  RX Refill Request    Who Called:  Gabrielleliliana Zamudio (Daughter)  Refill or New Rx:  New for Dr Kline/ but would be a refill  RX Name and Strength:  Clopidogrel 75 mg  How is the patient currently taking it? (ex. 1XDay): 1XDay  Is this a 30 day or 90 day RX:  30 tablets  Preferred Pharmacy with phone number:    [facility] [address] [place] [address] AT [facility] & [address] [address]  Phone: [phone] Fax: [phone]  Local or Mail Order: Local  Ordering Provider:  Mahesh Doctor  Best Call Back Number: Gabrielle at   Additional Information: Calling to request a new prescription. It would be a refill but new for Dr Kline to fill. Please advise.

## 2018-12-07 NOTE — TELEPHONE ENCOUNTER
Last seen on 6-7-2018    Upcoming appt on 12-13-18.    Pt does not have a cardiology at this time.

## 2018-12-10 ENCOUNTER — TELEPHONE (OUTPATIENT)
Dept: FAMILY MEDICINE | Facility: CLINIC | Age: 73
End: 2018-12-10

## 2018-12-10 NOTE — TELEPHONE ENCOUNTER
----- Message from Cherelle Elmore sent at 12/10/2018  4:07 PM CST -----  Contact: Daughter Gabrielle [phone]  The bone density orders were not sent to [facility] on [address] in [place].  Please fax it to:  [phone].  The appt is for tomorrow morning. Thank you!

## 2018-12-13 ENCOUNTER — OFFICE VISIT (OUTPATIENT)
Dept: FAMILY MEDICINE | Facility: CLINIC | Age: 73
End: 2018-12-13
Payer: MEDICARE

## 2018-12-13 VITALS
WEIGHT: 167.56 LBS | SYSTOLIC BLOOD PRESSURE: 114 MMHG | HEIGHT: 62 IN | BODY MASS INDEX: 30.83 KG/M2 | HEART RATE: 72 BPM | TEMPERATURE: 98 F | DIASTOLIC BLOOD PRESSURE: 72 MMHG

## 2018-12-13 DIAGNOSIS — D64.9 ANEMIA, UNSPECIFIED TYPE: ICD-10-CM

## 2018-12-13 DIAGNOSIS — J44.9 CHRONIC OBSTRUCTIVE PULMONARY DISEASE, UNSPECIFIED COPD TYPE: Chronic | ICD-10-CM

## 2018-12-13 DIAGNOSIS — R60.0 LEG EDEMA: ICD-10-CM

## 2018-12-13 DIAGNOSIS — M54.16 LUMBAR RADICULOPATHY: ICD-10-CM

## 2018-12-13 DIAGNOSIS — K92.2 UPPER GI BLEED: Primary | ICD-10-CM

## 2018-12-13 PROCEDURE — 99214 OFFICE O/P EST MOD 30 MIN: CPT | Mod: S$GLB,,, | Performed by: FAMILY MEDICINE

## 2018-12-13 PROCEDURE — 99499 UNLISTED E&M SERVICE: CPT | Mod: S$GLB,,, | Performed by: FAMILY MEDICINE

## 2018-12-13 PROCEDURE — 1101F PT FALLS ASSESS-DOCD LE1/YR: CPT | Mod: CPTII,S$GLB,, | Performed by: FAMILY MEDICINE

## 2018-12-13 PROCEDURE — 99999 PR PBB SHADOW E&M-EST. PATIENT-LVL III: CPT | Mod: PBBFAC,,, | Performed by: FAMILY MEDICINE

## 2018-12-13 RX ORDER — FUROSEMIDE 40 MG/1
40 TABLET ORAL 2 TIMES DAILY
COMMUNITY
End: 2018-12-26 | Stop reason: SDUPTHER

## 2018-12-13 RX ORDER — BUDESONIDE 0.5 MG/2ML
0.5 INHALANT ORAL DAILY
COMMUNITY
End: 2019-01-15 | Stop reason: SDUPTHER

## 2018-12-13 RX ORDER — ACETYLCYSTEINE 100 MG/ML
1 SOLUTION ORAL; RESPIRATORY (INHALATION) EVERY 6 HOURS PRN
Status: ON HOLD | COMMUNITY
End: 2022-01-25 | Stop reason: CLARIF

## 2018-12-13 RX ORDER — NAPROXEN SODIUM 220 MG
220 TABLET ORAL DAILY
COMMUNITY

## 2018-12-13 RX ORDER — SPIRONOLACTONE 25 MG/1
25 TABLET ORAL DAILY
COMMUNITY
End: 2018-12-26 | Stop reason: SDUPTHER

## 2018-12-13 RX ORDER — GABAPENTIN 600 MG/1
600 TABLET ORAL 2 TIMES DAILY
Qty: 180 TABLET | Refills: 1 | Status: SHIPPED | OUTPATIENT
Start: 2018-12-13 | End: 2019-06-07 | Stop reason: SDUPTHER

## 2018-12-13 RX ORDER — TRAMADOL HYDROCHLORIDE 50 MG/1
50 TABLET ORAL EVERY 6 HOURS PRN
Status: ON HOLD | COMMUNITY
End: 2022-01-25 | Stop reason: CLARIF

## 2018-12-13 RX ORDER — GABAPENTIN 300 MG/1
300 CAPSULE ORAL EVERY MORNING
COMMUNITY
End: 2018-12-13

## 2018-12-13 RX ORDER — ALBUTEROL SULFATE 90 UG/1
2 AEROSOL, METERED RESPIRATORY (INHALATION) EVERY 6 HOURS PRN
COMMUNITY
End: 2020-03-09 | Stop reason: SDUPTHER

## 2018-12-13 RX ORDER — PANTOPRAZOLE SODIUM 40 MG/1
40 TABLET, DELAYED RELEASE ORAL 2 TIMES DAILY
COMMUNITY
End: 2018-12-26 | Stop reason: SDUPTHER

## 2018-12-13 RX ORDER — GABAPENTIN 600 MG/1
600 TABLET ORAL NIGHTLY
COMMUNITY
End: 2018-12-13

## 2018-12-13 RX ORDER — METOPROLOL TARTRATE 25 MG/1
150 TABLET, FILM COATED ORAL 2 TIMES DAILY
COMMUNITY
End: 2018-12-26 | Stop reason: SDUPTHER

## 2018-12-13 NOTE — Clinical Note
Please let daughter, Gabrielle, know that I reviewed the hosp notes. Please confirm that they have a f/u with GI, she saw derrell in the hosp. Also, schedule repeat lab to monitor blood count in 3-4 weeks.

## 2018-12-14 NOTE — PROGRESS NOTES
Subjective:       Patient ID: Luda Peters is a 73 y.o. female.    Chief Complaint: Hospital Follow Up and Circulatory Problem (hands turn purple)      Luda Peters is in the office hospital f/u.    HPI  Patient is accompanied to the office by her daughter.     She had prolonged hosp and subsequent rehab stay for GI bleed and significant anemia that required transfusion.   Daughter, Gabrielle, is primary caregiver and provides hx. She reports that she brought her mom to the ER for significant pedal edema. During eval, her h/h of 8/29 was noted, and she was brought back for pud/ugib due to nsaid use. Her echo was updated and noted for diastolic dysfunction, EF 60%. A RLL infiltrate susp for aspiration pna was tx with augmentin.     At home, pt reports ongoing pain. She is on supplemental O2, continuous.     We reviewed her previous records with last cbc in 2016 and normal. Pt had refused any colon screening. Daughter notes that this was attributed to bc powder use, which has since been discontinued. They currently use tylenol qid an ibuprofen bid.   She notes ongoing issues with pain throughout the day that occurs before her next dose. Daughter, Gabrielle, notes that tramadol was too sedating and she was very concerned about fall risks with use, so it is reserved for when needed only.   We discussed increasing her gabapentin dose in the morning to try and alleviate this without increasing her sedation which they will try.    Past Medical History:   Diagnosis Date    Anxiety 9/26/2016    Back pain     Closed fracture of second lumbar vertebra with routine healing 12/7/2015    COPD (chronic obstructive pulmonary disease) 9/26/2016    Emphysema of lung      At [facility], we discussed her prev discussion with pulm that her mom's life expectancy was limited due to lung function, and as such would not continue to press for screening studies that the patient refused.    I did apologize to Gabrielle that I did not  include the cbc with her routine lab draw this year bc the previous had been normal. She expressed appreciation, and said that it was ok. She did express frustration that pulmonology had not checked a cbc given their ongoing monitoring, and that she did not get to see their est pulm during the hosp stay.     Current Outpatient Medications:     acetaminophen (TYLENOL) 500 MG tablet, Take 1,000 mg by mouth every 6 (six) hours as needed for Pain. , Disp: , Rfl:     acetylcysteine 100 mg/ml, 10%, (MUCOMYST) 100 mg/mL (10 %) nebulizer solution, Take 1 mL by nebulization every 6 (six) hours as needed., Disp: , Rfl:     albuterol (VENTOLIN HFA) 90 mcg/actuation inhaler, Inhale 2 puffs into the lungs every 6 (six) hours as needed for Wheezing. Rescue, Disp: , Rfl:     ARIPiprazole (ABILIFY) 2 MG Tab, Take 1 tablet (2 mg total) by mouth once daily., Disp: 90 tablet, Rfl: 1    budesonide (PULMICORT) 0.5 mg/2 mL nebulizer solution, Take 0.5 mg by nebulization once daily. Controller, Disp: , Rfl:     clopidogrel (PLAVIX) 75 mg tablet, Take 1 tablet (75 mg total) by mouth once daily., Disp: 30 tablet, Rfl: 0    docusate sodium (COLACE) 50 MG capsule, Take 100 mg by mouth every evening. , Disp: , Rfl:     escitalopram oxalate (LEXAPRO) 20 MG tablet, TAKE 1 TABLET BY MOUTH EVERY EVENING, Disp: 90 tablet, Rfl: 1    fluticasone-umeclidin-vilanter (TRELEGY ELLIPTA) 100-62.5-25 mcg DsDv, Inhale 1 puff into the lungs once daily., Disp: , Rfl:     furosemide (LASIX) 40 MG tablet, Take 40 mg by mouth 2 (two) times daily., Disp: , Rfl:     metoprolol tartrate (LOPRESSOR) 25 MG tablet, Take 150 mg by mouth 2 (two) times daily., Disp: , Rfl:     naproxen sodium (ANAPROX) 220 MG tablet, Take 220 mg by mouth 2 (two) times daily with meals., Disp: , Rfl:     nortriptyline (PAMELOR) 25 MG capsule, TAKE ONE CAPSULE BY MOUTH EVERY EVENING, Disp: 30 capsule, Rfl: 1    pantoprazole (PROTONIX) 40 MG tablet, Take 40 mg by mouth 2 (two)  times daily., Disp: , Rfl:     spironolactone (ALDACTONE) 25 MG tablet, Take 25 mg by mouth once daily., Disp: , Rfl:     traMADol (ULTRAM) 50 mg tablet, Take 50 mg by mouth every 6 (six) hours as needed for Pain., Disp: , Rfl:     diazePAM (VALIUM) 10 MG Tab, Take 2 tablets (20 mg total) by mouth nightly as needed., Disp: 30 tablet, Rfl: 0    gabapentin (NEURONTIN) 600 MG tablet, Take 1 tablet (600 mg total) by mouth 2 (two) times daily., Disp: 180 tablet, Rfl: 1    The 10-year ASCVD risk score (Hoa PETERSON Jr., et al., 2013) is: 10.2%    Values used to calculate the score:      Age: 73 years      Sex: Female      Is Non- : No      Diabetic: No      Tobacco smoker: No      Systolic Blood Pressure: 114 mmHg      Is BP treated: No      HDL Cholesterol: 59 mg/dL      Total Cholesterol: 172 mg/dL     Lab Results   Component Value Date    HGBA1C 5.7 (H) 03/21/2018    HGBA1C 5.9 02/16/2017    HGBA1C 5.9 10/01/2016     Lab Results   Component Value Date    LDLCALC 89.6 06/07/2018    CREATININE 0.8 06/07/2018   labs 2018 rev.     Review of Systems   Unable to perform ROS: Psychiatric disorder   Constitutional: Negative for activity change, fatigue and unexpected weight change.   HENT: Negative for rhinorrhea and trouble swallowing.    Eyes: Negative for discharge and visual disturbance.   Respiratory: Positive for cough (frequent, productive of white sputum) and shortness of breath (continuous O2 by nc). Negative for chest tightness and wheezing.    Cardiovascular: Positive for leg swelling. Negative for chest pain and palpitations.   Gastrointestinal: Negative for abdominal pain, blood in stool, constipation (denies) and diarrhea.   Endocrine: Negative for polydipsia and polyuria.   Genitourinary: Negative for difficulty urinating and hematuria.   Musculoskeletal: Positive for arthralgias. Negative for joint swelling.   Skin: Positive for color change (R leg darker in color from ankle to foot when  compared to L). Negative for rash and wound.   Neurological: Positive for weakness (ambulates with a walker). Negative for headaches.   Psychiatric/Behavioral: Positive for agitation. Negative for confusion (pt more coherent to conversation), dysphoric mood and sleep disturbance. The patient is nervous/anxious.            Objective:      Physical Exam   Constitutional: She is oriented to person, place, and time. She appears well-developed and well-nourished. No distress.   HENT:   Head: Normocephalic and atraumatic.   Eyes: Conjunctivae are normal. Right eye exhibits no discharge. Left eye exhibits no discharge. No scleral icterus.   Neck: Normal range of motion. Neck supple.   Cardiovascular: Normal rate.   Pulmonary/Chest: Accessory muscle usage (for deep insp) present. No respiratory distress. She has decreased breath sounds.   Abdominal: Soft. She exhibits no distension.   Musculoskeletal: Normal range of motion. She exhibits edema (trace pitting to ble).   Pt examined in chair   Lymphadenopathy:     She has no cervical adenopathy.   Neurological: She is alert and oriented to person, place, and time. No cranial nerve deficit.   Skin: Skin is warm and dry. No rash noted.   Psychiatric: She has a normal mood and affect. Her speech is tangential (mild-moderate). She is agitated (mild, but cooperative).   She was much more personable at today's visit, attempts at humor, more willing to allow conversation to include her daughter.   Nursing note and vitals reviewed.        Screening recommendations appropriate to age and health status were reviewed.    Assessment & Plan:    Upper GI bleed  Comments:  resolved, recommend GI f/u per hospital discharge instructions. Continue PPI.   Chronic obstructive pulmonary disease, unspecified COPD type  Comments:  stable on supplemental O2, updated referral for pulm as her prev provider is no longer in practice.  Orders:  -     Ambulatory referral to Pulmonology  Portable O2 order  sent to her local supplier by request.   Leg edema  -     US Lower Extremity Veins Bilateral; Future; Expected date: 12/13/2018  Update for monitoring. Prev study 10/11 reported neg dvt in hosp records.  Lumbar radiculopathy  Comments:  increase gabapentin to 600mg bid.   Anemia, unspecified type  Comments:  update lab for review in 3 weeks.   Other orders  -     gabapentin (NEURONTIN) 600 MG tablet; Take 1 tablet (600 mg total) by mouth 2 (two) times daily.  Dispense: 180 tablet; Refill: 1

## 2018-12-17 ENCOUNTER — TELEPHONE (OUTPATIENT)
Dept: FAMILY MEDICINE | Facility: CLINIC | Age: 73
End: 2018-12-17

## 2018-12-17 NOTE — TELEPHONE ENCOUNTER
----- Message from Juani Liu sent at 12/17/2018  4:00 PM CST -----  Please call pt's daughter Gabrielle [phone]  Returning Rommel's call

## 2018-12-19 RX ORDER — ESCITALOPRAM OXALATE 20 MG/1
TABLET ORAL
Qty: 90 TABLET | Refills: 1 | Status: SHIPPED | OUTPATIENT
Start: 2018-12-19 | End: 2019-06-16 | Stop reason: SDUPTHER

## 2018-12-19 RX ORDER — NORTRIPTYLINE HYDROCHLORIDE 25 MG/1
CAPSULE ORAL
Qty: 90 CAPSULE | Refills: 1 | Status: SHIPPED | OUTPATIENT
Start: 2018-12-19 | End: 2019-06-16 | Stop reason: SDUPTHER

## 2018-12-22 RX ORDER — SPIRONOLACTONE 25 MG/1
TABLET ORAL
Qty: 30 TABLET | Refills: 0 | Status: CANCELLED | OUTPATIENT
Start: 2018-12-22

## 2018-12-22 RX ORDER — PANTOPRAZOLE SODIUM 40 MG/1
TABLET, DELAYED RELEASE ORAL
Qty: 60 TABLET | Refills: 0 | Status: CANCELLED | OUTPATIENT
Start: 2018-12-22

## 2018-12-22 RX ORDER — METOPROLOL TARTRATE 25 MG/1
TABLET, FILM COATED ORAL
Qty: 60 TABLET | Refills: 0 | Status: CANCELLED | OUTPATIENT
Start: 2018-12-22

## 2018-12-26 RX ORDER — FUROSEMIDE 40 MG/1
40 TABLET ORAL 2 TIMES DAILY
Qty: 60 TABLET | Refills: 0 | Status: SHIPPED | OUTPATIENT
Start: 2018-12-26 | End: 2019-01-22 | Stop reason: SDUPTHER

## 2018-12-26 RX ORDER — CLOPIDOGREL BISULFATE 75 MG/1
75 TABLET ORAL DAILY
Qty: 30 TABLET | Refills: 0 | Status: SHIPPED | OUTPATIENT
Start: 2018-12-26 | End: 2019-02-04 | Stop reason: SDUPTHER

## 2018-12-26 RX ORDER — PANTOPRAZOLE SODIUM 40 MG/1
40 TABLET, DELAYED RELEASE ORAL 2 TIMES DAILY
Qty: 60 TABLET | Refills: 0 | Status: SHIPPED | OUTPATIENT
Start: 2018-12-26 | End: 2019-01-22 | Stop reason: SDUPTHER

## 2018-12-26 RX ORDER — METOPROLOL TARTRATE 25 MG/1
150 TABLET, FILM COATED ORAL 2 TIMES DAILY
Qty: 360 TABLET | Refills: 0 | Status: SHIPPED | OUTPATIENT
Start: 2018-12-26 | End: 2019-06-24 | Stop reason: SDUPTHER

## 2018-12-26 RX ORDER — SPIRONOLACTONE 25 MG/1
25 TABLET ORAL DAILY
Qty: 30 TABLET | Refills: 0 | Status: SHIPPED | OUTPATIENT
Start: 2018-12-26 | End: 2019-01-22 | Stop reason: SDUPTHER

## 2018-12-26 NOTE — TELEPHONE ENCOUNTER
Patient has been out of medication for 3 days, can you assist in refill until Dr. Kline returns  Pt Of Nuzhat Kline MD    Last seen on: 12/13/18    Next appt: N/A    Last refill:     Allergies:   Review of patient's allergies indicates:   Allergen Reactions    Iodine and iodide containing products        Pharmacy:   [facility] [address] [place] [address] AT [facility] & [address] [address]  Phone: [phone] Fax: [phone]      Please review! Thank you!

## 2018-12-26 NOTE — TELEPHONE ENCOUNTER
----- Message from Quentin Molina sent at 12/26/2018  3:25 PM CST -----  Contact: Patient  Type: Needs Medical Advice    Who Called:  Patient  Best Call Back Number: [phone]  Additional Information: Returning call to have medications filled. States this is the second day and has heard nothing from the office. Also, has question on Lasix because she thought quantity was changed to 60 instead of 30. Please advise.  metoprolol tartrate (LOPRESSOR) 25 MG tablet x60  pantoprazole (PROTONIX) 40 MG tablet x60  furosemide (LASIX) 40 MG tablet  x60  spironolactone (ALDACTONE) 25 MG tablet x30

## 2018-12-26 NOTE — TELEPHONE ENCOUNTER
----- Message from Lucero Wallis sent at 12/26/2018  9:54 AM CST -----  Contact: daughter Gabrielle  Patient daughter need to speak to nurse regarding patient has appt with dr singh on 01/30    And to please fax over any notes from hospital discharge to [phone] per Zahra      Please call татьяна Anthony at [phone]

## 2018-12-26 NOTE — TELEPHONE ENCOUNTER
----- Message from Cordelia Deras sent at 12/26/2018  9:13 AM CST -----  Contact: Gabrielle Zamudio (Daughter)  Type: Needs Medical Advice    Who Called:  Gabrielle Zamudio (Daughter)  Symptoms (please be specific):  na  How long has patient had these symptoms:  na  Pharmacy name and phone #:    [facility] [address] [place] [address] AT [facility] & [address] [address]  Phone: [phone] Fax: [phone]  Best Call Back Number: Gabrielle at   Additional Information: Calling to speak with the Nurse about the patient's refills for metoprolol tartrate (LOPRESSOR) 25 MG tablet, pantoprazole (PROTONIX) 40 MG tablet, furosemide (LASIX) 40 MG tablet and spironolactone (ALDACTONE) 25 MG tablet. The Pharmacy stated they are waiting for the Doctor's response. Please advise. Call to pod. No answer.

## 2018-12-27 RX ORDER — ARIPIPRAZOLE 2 MG/1
TABLET ORAL
Qty: 90 TABLET | Refills: 0 | Status: SHIPPED | OUTPATIENT
Start: 2018-12-27 | End: 2019-03-11 | Stop reason: SDUPTHER

## 2019-01-04 ENCOUNTER — TELEPHONE (OUTPATIENT)
Dept: FAMILY MEDICINE | Facility: CLINIC | Age: 74
End: 2019-01-04

## 2019-01-04 DIAGNOSIS — D50.0 IRON DEFICIENCY ANEMIA DUE TO CHRONIC BLOOD LOSS: Primary | ICD-10-CM

## 2019-01-04 NOTE — TELEPHONE ENCOUNTER
Labs have been scheduled for 1/5/19 at [facility]. Gabrielle has verbalized understanding. Gabrielle would like to know if labs would need to be standing orders to allow more frequent monitoring without having to call to request. Gabrielle has stated that she will check calendar and call Dr. Damico to discuss follow up as she is not sure that she has scheduled or not.

## 2019-01-04 NOTE — TELEPHONE ENCOUNTER
----- Message from Nuzhat Kline MD sent at 12/14/2018  2:43 PM CST -----  Please let daughter, Gabrielle, know that I reviewed the hosp notes. Please confirm that they have a f/u with GI, she saw derrell in the hosp. Also, schedule repeat lab to monitor blood count in 3-4 weeks.

## 2019-01-05 ENCOUNTER — LAB VISIT (OUTPATIENT)
Dept: LAB | Facility: HOSPITAL | Age: 74
End: 2019-01-05
Attending: FAMILY MEDICINE
Payer: MEDICARE

## 2019-01-05 DIAGNOSIS — D64.9 ANEMIA, UNSPECIFIED TYPE: ICD-10-CM

## 2019-01-05 LAB
ALBUMIN SERPL BCP-MCNC: 3.7 G/DL
ALP SERPL-CCNC: 97 U/L
ALT SERPL W/O P-5'-P-CCNC: 11 U/L
ANION GAP SERPL CALC-SCNC: 10 MMOL/L
AST SERPL-CCNC: 20 U/L
BASOPHILS # BLD AUTO: 0.07 K/UL
BASOPHILS NFR BLD: 0.8 %
BILIRUB SERPL-MCNC: 0.4 MG/DL
BUN SERPL-MCNC: 33 MG/DL
CALCIUM SERPL-MCNC: 10.4 MG/DL
CHLORIDE SERPL-SCNC: 90 MMOL/L
CO2 SERPL-SCNC: 40 MMOL/L
CREAT SERPL-MCNC: 0.9 MG/DL
DIFFERENTIAL METHOD: ABNORMAL
EOSINOPHIL # BLD AUTO: 0.8 K/UL
EOSINOPHIL NFR BLD: 9 %
ERYTHROCYTE [DISTWIDTH] IN BLOOD BY AUTOMATED COUNT: 15.6 %
EST. GFR  (AFRICAN AMERICAN): >60 ML/MIN/1.73 M^2
EST. GFR  (NON AFRICAN AMERICAN): >60 ML/MIN/1.73 M^2
GLUCOSE SERPL-MCNC: 96 MG/DL
HCT VFR BLD AUTO: 43.3 %
HGB BLD-MCNC: 12.5 G/DL
IMM GRANULOCYTES # BLD AUTO: 0.01 K/UL
IMM GRANULOCYTES NFR BLD AUTO: 0.1 %
IRON SERPL-MCNC: 88 UG/DL
LYMPHOCYTES # BLD AUTO: 2.7 K/UL
LYMPHOCYTES NFR BLD: 32.1 %
MCH RBC QN AUTO: 28 PG
MCHC RBC AUTO-ENTMCNC: 28.9 G/DL
MCV RBC AUTO: 97 FL
MONOCYTES # BLD AUTO: 0.6 K/UL
MONOCYTES NFR BLD: 7.4 %
NEUTROPHILS # BLD AUTO: 4.3 K/UL
NEUTROPHILS NFR BLD: 50.6 %
NRBC BLD-RTO: 0 /100 WBC
PLATELET # BLD AUTO: 305 K/UL
PMV BLD AUTO: 10.8 FL
POTASSIUM SERPL-SCNC: 5.1 MMOL/L
PROT SERPL-MCNC: 7.7 G/DL
RBC # BLD AUTO: 4.47 M/UL
SATURATED IRON: 18 %
SODIUM SERPL-SCNC: 140 MMOL/L
TOTAL IRON BINDING CAPACITY: 502 UG/DL
TRANSFERRIN SERPL-MCNC: 339 MG/DL
WBC # BLD AUTO: 8.41 K/UL

## 2019-01-05 PROCEDURE — 36415 COLL VENOUS BLD VENIPUNCTURE: CPT | Mod: PO

## 2019-01-05 PROCEDURE — 83540 ASSAY OF IRON: CPT

## 2019-01-05 PROCEDURE — 80053 COMPREHEN METABOLIC PANEL: CPT

## 2019-01-05 PROCEDURE — 85025 COMPLETE CBC W/AUTO DIFF WBC: CPT

## 2019-01-07 ENCOUNTER — TELEPHONE (OUTPATIENT)
Dept: ADMINISTRATIVE | Facility: CLINIC | Age: 74
End: 2019-01-07

## 2019-01-07 NOTE — TELEPHONE ENCOUNTER
----- Message from Rosa Elena Manriquez sent at 1/7/2019  3:17 PM CST -----  Contact: daughter  Daughter -Gabrielle Zamudio [phone] is returning call to Nurse Jacome/please call

## 2019-01-07 NOTE — TELEPHONE ENCOUNTER
Home Health SOC 11/10/2018 to 12/20/2018 with [facility] , Dr Nuzhat Kline. SN,PT services

## 2019-01-14 ENCOUNTER — OFFICE VISIT (OUTPATIENT)
Dept: CARDIOLOGY | Facility: CLINIC | Age: 74
End: 2019-01-14
Payer: MEDICARE

## 2019-01-14 VITALS
WEIGHT: 163.56 LBS | SYSTOLIC BLOOD PRESSURE: 120 MMHG | HEIGHT: 62 IN | DIASTOLIC BLOOD PRESSURE: 72 MMHG | BODY MASS INDEX: 30.1 KG/M2 | HEART RATE: 80 BPM

## 2019-01-14 DIAGNOSIS — J44.9 CHRONIC OBSTRUCTIVE PULMONARY DISEASE, UNSPECIFIED COPD TYPE: Chronic | ICD-10-CM

## 2019-01-14 DIAGNOSIS — I51.89 DIASTOLIC DYSFUNCTION: ICD-10-CM

## 2019-01-14 DIAGNOSIS — Z86.79 H/O ATRIAL FIBRILLATION WITHOUT CURRENT MEDICATION: ICD-10-CM

## 2019-01-14 PROCEDURE — 99204 PR OFFICE/OUTPT VISIT, NEW, LEVL IV, 45-59 MIN: ICD-10-PCS | Mod: S$GLB,,, | Performed by: INTERNAL MEDICINE

## 2019-01-14 PROCEDURE — 99499 RISK ADDL DX/OHS AUDIT: ICD-10-PCS | Mod: S$GLB,,, | Performed by: INTERNAL MEDICINE

## 2019-01-14 PROCEDURE — 1100F PTFALLS ASSESS-DOCD GE2>/YR: CPT | Mod: CPTII,S$GLB,, | Performed by: INTERNAL MEDICINE

## 2019-01-14 PROCEDURE — 99499 UNLISTED E&M SERVICE: CPT | Mod: S$GLB,,, | Performed by: INTERNAL MEDICINE

## 2019-01-14 PROCEDURE — 99999 PR PBB SHADOW E&M-EST. PATIENT-LVL III: CPT | Mod: PBBFAC,,, | Performed by: INTERNAL MEDICINE

## 2019-01-14 PROCEDURE — 99999 PR PBB SHADOW E&M-EST. PATIENT-LVL III: ICD-10-PCS | Mod: PBBFAC,,, | Performed by: INTERNAL MEDICINE

## 2019-01-14 PROCEDURE — 1100F PR PT FALLS ASSESS DOC 2+ FALLS/FALL W/INJURY/YR: ICD-10-PCS | Mod: CPTII,S$GLB,, | Performed by: INTERNAL MEDICINE

## 2019-01-14 PROCEDURE — 99204 OFFICE O/P NEW MOD 45 MIN: CPT | Mod: S$GLB,,, | Performed by: INTERNAL MEDICINE

## 2019-01-14 PROCEDURE — 3288F PR FALLS RISK ASSESSMENT DOCUMENTED: ICD-10-PCS | Mod: CPTII,S$GLB,, | Performed by: INTERNAL MEDICINE

## 2019-01-14 PROCEDURE — 3288F FALL RISK ASSESSMENT DOCD: CPT | Mod: CPTII,S$GLB,, | Performed by: INTERNAL MEDICINE

## 2019-01-14 NOTE — PROGRESS NOTES
Subjective:    Patient ID:  Luda Peters is a 73 y.o. female who presents for evaluation of COPD ([facility] ); Anemia; edema BLE; Congestive Heart Failure; and Hypertension      Pt here for hospital f/u from October admit for GI bleed. She was admitted at [facility] in October with severely swollen legs and evaluation showed a profound anemia with bleedin ulcers. Hospital stay was complicated by her COPD and developed a pneumonia. Daughter relates she had atrial fibrillation and was started on lopressor. I have no records to review but PCP noted Echo with EF 60% with diastolic dysfunction. According to her daughter she has been doing fairly well and has appointment to establish with a new pulmonologist for her O2 dependent COPD. Her fluid status has apparently been stable on diuretics and low sodium diet which Pt does not like. No chest pain or palpitations. She reports no prior cardiac issues.        Review of Systems   Constitution: Negative for weight gain and weight loss.   HENT: Negative.    Eyes: Negative.    Cardiovascular: Positive for dyspnea on exertion. Negative for chest pain, claudication, cyanosis, irregular heartbeat, leg swelling, near-syncope, orthopnea (no PND), palpitations and syncope.   Respiratory: Positive for shortness of breath. Negative for cough, hemoptysis and snoring.    Endocrine: Negative.    Skin: Negative.    Musculoskeletal: Negative for joint pain, muscle cramps, muscle weakness and myalgias.   Gastrointestinal: Negative for diarrhea, hematemesis, nausea and vomiting.   Genitourinary: Negative.    Neurological: Negative for dizziness, focal weakness, light-headedness, loss of balance, numbness, paresthesias and seizures.   Psychiatric/Behavioral: Negative.         Objective:    Physical Exam   Constitutional: She is oriented to person, place, and time. She appears well-developed and well-nourished.   Eyes: Pupils are equal, round, and reactive to light.   Neck:  Normal range of motion. No thyromegaly present.   Cardiovascular: Normal rate, regular rhythm, S1 normal, S2 normal, normal heart sounds, intact distal pulses and normal pulses.  No extrasystoles are present. PMI is not displaced. Exam reveals no friction rub.   No murmur heard.  Pulmonary/Chest: Effort normal and breath sounds normal. She has no wheezes. She has no rales. She exhibits no tenderness.   Abdominal: Soft. Bowel sounds are normal. She exhibits no distension and no mass. There is no tenderness.   Musculoskeletal: Normal range of motion. She exhibits no edema.   Neurological: She is alert and oriented to person, place, and time.   Skin: Skin is warm and dry.   Vitals reviewed.        Assessment:       1. H/O atrial fibrillation without current medication - while in hospital with GI bleed and pneumonia    2. Diastolic dysfunction    3. Chronic obstructive pulmonary disease, unspecified COPD type         Plan:       Daughter will get cardiac related records from [facility]  Presently in sinus rhythm with no cardiac related complaints  Continue current Rx  Continue with low sodium diet  F/u 6 months

## 2019-01-15 RX ORDER — BUDESONIDE 0.5 MG/2ML
INHALANT ORAL
Qty: 120 ML | Refills: 1 | Status: SHIPPED | OUTPATIENT
Start: 2019-01-15 | End: 2020-08-06 | Stop reason: SDUPTHER

## 2019-01-22 RX ORDER — PANTOPRAZOLE SODIUM 40 MG/1
TABLET, DELAYED RELEASE ORAL
Qty: 60 TABLET | Refills: 3 | Status: SHIPPED | OUTPATIENT
Start: 2019-01-22 | End: 2019-05-28 | Stop reason: SDUPTHER

## 2019-01-22 RX ORDER — SPIRONOLACTONE 25 MG/1
TABLET ORAL
Qty: 30 TABLET | Refills: 3 | Status: SHIPPED | OUTPATIENT
Start: 2019-01-22 | End: 2019-05-28 | Stop reason: SDUPTHER

## 2019-01-22 RX ORDER — FUROSEMIDE 40 MG/1
TABLET ORAL
Qty: 60 TABLET | Refills: 3 | Status: SHIPPED | OUTPATIENT
Start: 2019-01-22 | End: 2019-05-28 | Stop reason: SDUPTHER

## 2019-02-04 RX ORDER — CLOPIDOGREL BISULFATE 75 MG/1
75 TABLET ORAL DAILY
Qty: 90 TABLET | Refills: 1 | Status: SHIPPED | OUTPATIENT
Start: 2019-02-04 | End: 2019-08-07 | Stop reason: SDUPTHER

## 2019-02-04 NOTE — TELEPHONE ENCOUNTER
Pt Of Nuzhat Kline MD    Last seen on: 01/14/19    Next appt: n/a    Last refill: 12/31/18    Allergies:   Review of patient's allergies indicates:   Allergen Reactions    Iodine and iodide containing products        Pharmacy: [facility]   [phone]  Fax:[phone]    Please review! Thank you!

## 2019-02-22 DIAGNOSIS — Z12.39 BREAST CANCER SCREENING: ICD-10-CM

## 2019-03-11 ENCOUNTER — OFFICE VISIT (OUTPATIENT)
Dept: GASTROENTEROLOGY | Facility: CLINIC | Age: 74
End: 2019-03-11
Payer: MEDICARE

## 2019-03-11 VITALS
HEART RATE: 75 BPM | DIASTOLIC BLOOD PRESSURE: 78 MMHG | WEIGHT: 167.31 LBS | HEIGHT: 62 IN | BODY MASS INDEX: 30.79 KG/M2 | SYSTOLIC BLOOD PRESSURE: 122 MMHG

## 2019-03-11 DIAGNOSIS — Z87.11 HISTORY OF GASTRIC ULCER: Primary | ICD-10-CM

## 2019-03-11 DIAGNOSIS — M19.90 ARTHRITIS: ICD-10-CM

## 2019-03-11 DIAGNOSIS — G89.4 CHRONIC PAIN SYNDROME: ICD-10-CM

## 2019-03-11 DIAGNOSIS — J44.9 CHRONIC OBSTRUCTIVE PULMONARY DISEASE, UNSPECIFIED COPD TYPE: Chronic | ICD-10-CM

## 2019-03-11 PROCEDURE — 99999 PR PBB SHADOW E&M-EST. PATIENT-LVL III: ICD-10-PCS | Mod: PBBFAC,,, | Performed by: INTERNAL MEDICINE

## 2019-03-11 PROCEDURE — 99999 PR PBB SHADOW E&M-EST. PATIENT-LVL III: CPT | Mod: PBBFAC,,, | Performed by: INTERNAL MEDICINE

## 2019-03-11 PROCEDURE — 1101F PR PT FALLS ASSESS DOC 0-1 FALLS W/OUT INJ PAST YR: ICD-10-PCS | Mod: CPTII,S$GLB,, | Performed by: INTERNAL MEDICINE

## 2019-03-11 PROCEDURE — 99499 UNLISTED E&M SERVICE: CPT | Mod: S$GLB,,, | Performed by: INTERNAL MEDICINE

## 2019-03-11 PROCEDURE — 99499 RISK ADDL DX/OHS AUDIT: ICD-10-PCS | Mod: S$GLB,,, | Performed by: INTERNAL MEDICINE

## 2019-03-11 PROCEDURE — 1101F PT FALLS ASSESS-DOCD LE1/YR: CPT | Mod: CPTII,S$GLB,, | Performed by: INTERNAL MEDICINE

## 2019-03-11 PROCEDURE — 99203 PR OFFICE/OUTPT VISIT, NEW, LEVL III, 30-44 MIN: ICD-10-PCS | Mod: S$GLB,,, | Performed by: INTERNAL MEDICINE

## 2019-03-11 PROCEDURE — 99203 OFFICE O/P NEW LOW 30 MIN: CPT | Mod: S$GLB,,, | Performed by: INTERNAL MEDICINE

## 2019-03-11 RX ORDER — ARIPIPRAZOLE 2 MG/1
TABLET ORAL
COMMUNITY
End: 2019-07-26

## 2019-03-11 NOTE — PROGRESS NOTES
"Subjective:       Patient ID: Luda Peters is a 73 y.o. female.    Chief Complaint: Anemia    Ms MCPHERSON is here with her dtr, Gabrielle, for f/u after recent hospitalization in October (I could not find the records entered into Epic, neither [facility] or [facility], but I'll refer you to Dr. Kline's most recent OV note 12/13/2018).    She presented for leg swelling (she describes as fairly acute).  But she was found to be rather anemic, Hgb ~8 (accord to Dr. Kline.  Gabrielle reports "4/7.").  She was immediately given 2 units of blood, then taken for EGD that found gastric ulcers (NSAID ulcers?) and were cauterized.  She was given more blood after that.  She was d/c'd on Protonix (as well as Plavix for her cardiac issues).  She reports she is doing well now, is asymptomatic.  (However, she was asymptomatic GI wise at presentation, so symptomatology may be unreliable).    Also, labs show a good recovery of her blood count, back to normal.  Results for LUDA PETERS (MRN [id number]) as of 3/11/2019 16:34    1/5/2019 09:37  WBC: 8.41  Hemoglobin: 12.5  Hematocrit: 43.3  MCV: 97  Platelets: 305    And we briefly discussed the status of her colonoscopy and could not tell me exactly when the last time it was done.  But as noted by Dr. Kline, she is adamantly refusing to do another.      12/13/2018  Chief Complaint: Hospital Follow Up and Circulatory Problem (hands turn purple)      Luda Peters is in the office hospital f/u.     HPI  Patient is accompanied to the office by her daughter.      She had prolonged hosp and subsequent rehab stay for GI bleed and significant anemia that required transfusion.   Daughter, Gabrielle, is primary caregiver and provides hx. She reports that she brought her mom to the ER for significant pedal edema. During eval, her h/h of 8/29 was noted, and she was brought back for pud/ugib due to nsaid use. Her echo was updated and noted for diastolic dysfunction, EF 60%. A RLL infiltrate susp " for aspiration pna was tx with augmentin.      At home, pt reports ongoing pain. She is on supplemental O2, continuous.      We reviewed her previous records with last cbc in 2016 and normal. Pt had refused any colon screening. Daughter notes that this was attributed to bc powder use, which has since been discontinued. They currently use tylenol qid an ibuprofen bid.   She notes ongoing issues with pain throughout the day that occurs before her next dose. Daughter, Gabrielle, notes that tramadol was too sedating and she was very concerned about fall risks with use, so it is reserved for when needed only.   We discussed increasing her gabapentin dose in the morning to try and alleviate this without increasing her sedation which they will try.      Review of Systems   Constitutional: Negative for appetite change, fever and unexpected weight change.   HENT: Negative.  Negative for mouth sores, sore throat and trouble swallowing.    Eyes: Negative.    Respiratory: Negative.  Negative for cough and choking.    Cardiovascular: Negative.  Negative for chest pain and leg swelling.   Gastrointestinal: Negative for abdominal distention, abdominal pain, anal bleeding, blood in stool, constipation, diarrhea, nausea and vomiting.   Genitourinary: Negative.    Musculoskeletal: Positive for arthralgias (Hip, knees, and feet.), back pain and gait problem.   Skin: Negative.  Negative for color change and rash.   Hematological: Negative for adenopathy.   Psychiatric/Behavioral: Negative.        Objective:      Physical Exam   Constitutional: She is oriented to person, place, and time. She appears well-developed and well-nourished.   HENT:   Mouth/Throat: Oropharynx is clear and moist. No oropharyngeal exudate.   Eyes: No scleral icterus.   Neck: No tracheal deviation present. No thyromegaly present.   Cardiovascular: Normal rate, regular rhythm and normal heart sounds.   Pulmonary/Chest: Effort normal and breath sounds normal.    Abdominal: Soft. Bowel sounds are normal. She exhibits no distension and no mass. There is no tenderness. There is no guarding.   Slightly obese.   Lymphadenopathy:     She has no cervical adenopathy.   Neurological: She is alert and oriented to person, place, and time.   Skin: Skin is warm and dry. No rash noted.   Psychiatric: She has a normal mood and affect.   Nursing note and vitals reviewed.      Assessment:     History of gastric ulcer    Chronic obstructive pulmonary disease, unspecified COPD type    Arthritis    Chronic pain syndrome          Plan:         1. Cont meds the same.    2. Sched for EGD at [facility], to assess for healing.    3. Consider f/u with Dr. Mccall, for further pain mgmt.

## 2019-03-22 PROBLEM — Z87.11 HX OF GASTRIC ULCER: Status: ACTIVE | Noted: 2019-03-22

## 2019-03-28 ENCOUNTER — TELEPHONE (OUTPATIENT)
Dept: GASTROENTEROLOGY | Facility: CLINIC | Age: 74
End: 2019-03-28

## 2019-05-29 RX ORDER — FUROSEMIDE 40 MG/1
TABLET ORAL
Qty: 180 TABLET | Refills: 1 | Status: SHIPPED | OUTPATIENT
Start: 2019-05-29 | End: 2019-11-21 | Stop reason: SDUPTHER

## 2019-05-29 RX ORDER — PANTOPRAZOLE SODIUM 40 MG/1
TABLET, DELAYED RELEASE ORAL
Qty: 180 TABLET | Refills: 1 | Status: SHIPPED | OUTPATIENT
Start: 2019-05-29 | End: 2019-11-21 | Stop reason: SDUPTHER

## 2019-05-29 RX ORDER — SPIRONOLACTONE 25 MG/1
TABLET ORAL
Qty: 90 TABLET | Refills: 1 | Status: SHIPPED | OUTPATIENT
Start: 2019-05-29 | End: 2019-11-21 | Stop reason: SDUPTHER

## 2019-06-10 RX ORDER — GABAPENTIN 600 MG/1
TABLET ORAL
Qty: 180 TABLET | Refills: 1 | Status: SHIPPED | OUTPATIENT
Start: 2019-06-10 | End: 2019-12-09 | Stop reason: SDUPTHER

## 2019-06-17 RX ORDER — ARIPIPRAZOLE 2 MG/1
TABLET ORAL
Qty: 90 TABLET | Refills: 0 | Status: SHIPPED | OUTPATIENT
Start: 2019-06-17 | End: 2019-09-16 | Stop reason: SDUPTHER

## 2019-06-17 RX ORDER — ESCITALOPRAM OXALATE 20 MG/1
TABLET ORAL
Qty: 90 TABLET | Refills: 0 | Status: SHIPPED | OUTPATIENT
Start: 2019-06-17 | End: 2019-09-16 | Stop reason: SDUPTHER

## 2019-06-17 RX ORDER — NORTRIPTYLINE HYDROCHLORIDE 25 MG/1
CAPSULE ORAL
Qty: 90 CAPSULE | Refills: 0 | Status: SHIPPED | OUTPATIENT
Start: 2019-06-17 | End: 2019-09-16 | Stop reason: SDUPTHER

## 2019-06-25 RX ORDER — METOPROLOL TARTRATE 25 MG/1
25 TABLET, FILM COATED ORAL 2 TIMES DAILY
Qty: 180 TABLET | Refills: 0 | Status: SHIPPED | OUTPATIENT
Start: 2019-06-25 | End: 2019-09-11 | Stop reason: SDUPTHER

## 2019-07-12 ENCOUNTER — PATIENT OUTREACH (OUTPATIENT)
Dept: ADMINISTRATIVE | Facility: HOSPITAL | Age: 74
End: 2019-07-12

## 2019-07-18 ENCOUNTER — TELEPHONE (OUTPATIENT)
Dept: FAMILY MEDICINE | Facility: CLINIC | Age: 74
End: 2019-07-18

## 2019-07-18 DIAGNOSIS — I51.89 DIASTOLIC DYSFUNCTION: ICD-10-CM

## 2019-07-18 DIAGNOSIS — R73.03 PREDIABETES: ICD-10-CM

## 2019-07-18 DIAGNOSIS — Z86.79 H/O ATRIAL FIBRILLATION WITHOUT CURRENT MEDICATION: ICD-10-CM

## 2019-07-18 DIAGNOSIS — Z00.00 PREVENTATIVE HEALTH CARE: Primary | ICD-10-CM

## 2019-07-18 NOTE — TELEPHONE ENCOUNTER
Tried to reach pt. No answer, will try again later and I left a message on answering machine.    Contacting to inform pt of orders in and ready to schedule.

## 2019-07-18 NOTE — TELEPHONE ENCOUNTER
----- Message from Cameron ANDREA Frisard sent at 7/18/2019  7:26 AM CDT -----  Contact: Dtr/Gabrielle Anthony called in and stated patient has appt on Friday 7/26/19 & wanted to see if patient needed any lab work prior?    Gabrielle's call back number is [phone] cell

## 2019-07-20 ENCOUNTER — LAB VISIT (OUTPATIENT)
Dept: LAB | Facility: HOSPITAL | Age: 74
End: 2019-07-20
Attending: FAMILY MEDICINE
Payer: MEDICARE

## 2019-07-20 DIAGNOSIS — D50.0 IRON DEFICIENCY ANEMIA DUE TO CHRONIC BLOOD LOSS: ICD-10-CM

## 2019-07-20 DIAGNOSIS — I51.89 DIASTOLIC DYSFUNCTION: ICD-10-CM

## 2019-07-20 DIAGNOSIS — R73.03 PREDIABETES: ICD-10-CM

## 2019-07-20 DIAGNOSIS — Z86.79 H/O ATRIAL FIBRILLATION WITHOUT CURRENT MEDICATION: ICD-10-CM

## 2019-07-20 LAB
ALBUMIN SERPL BCP-MCNC: 3.5 G/DL (ref 3.5–5.2)
ALP SERPL-CCNC: 109 U/L (ref 55–135)
ALT SERPL W/O P-5'-P-CCNC: 10 U/L (ref 10–44)
ANION GAP SERPL CALC-SCNC: 13 MMOL/L (ref 8–16)
AST SERPL-CCNC: 18 U/L (ref 10–40)
BASOPHILS # BLD AUTO: 0.07 K/UL (ref 0–0.2)
BASOPHILS NFR BLD: 0.7 % (ref 0–1.9)
BILIRUB SERPL-MCNC: 0.4 MG/DL (ref 0.1–1)
BUN SERPL-MCNC: 32 MG/DL (ref 8–23)
CALCIUM SERPL-MCNC: 9.6 MG/DL (ref 8.7–10.5)
CHLORIDE SERPL-SCNC: 90 MMOL/L (ref 95–110)
CHOLEST SERPL-MCNC: 189 MG/DL (ref 120–199)
CHOLEST/HDLC SERPL: 3.4 {RATIO} (ref 2–5)
CO2 SERPL-SCNC: 37 MMOL/L (ref 23–29)
CREAT SERPL-MCNC: 1 MG/DL (ref 0.5–1.4)
DIFFERENTIAL METHOD: ABNORMAL
EOSINOPHIL # BLD AUTO: 0.6 K/UL (ref 0–0.5)
EOSINOPHIL NFR BLD: 5.8 % (ref 0–8)
ERYTHROCYTE [DISTWIDTH] IN BLOOD BY AUTOMATED COUNT: 15.2 % (ref 11.5–14.5)
EST. GFR  (AFRICAN AMERICAN): >60 ML/MIN/1.73 M^2
EST. GFR  (NON AFRICAN AMERICAN): 55.6 ML/MIN/1.73 M^2
ESTIMATED AVG GLUCOSE: 111 MG/DL (ref 68–131)
GLUCOSE SERPL-MCNC: 92 MG/DL (ref 70–110)
HBA1C MFR BLD HPLC: 5.5 % (ref 4–5.6)
HCT VFR BLD AUTO: 40.4 % (ref 37–48.5)
HDLC SERPL-MCNC: 56 MG/DL (ref 40–75)
HDLC SERPL: 29.6 % (ref 20–50)
HGB BLD-MCNC: 11.6 G/DL (ref 12–16)
IMM GRANULOCYTES # BLD AUTO: 0.02 K/UL (ref 0–0.04)
IMM GRANULOCYTES NFR BLD AUTO: 0.2 % (ref 0–0.5)
IRON SERPL-MCNC: 57 UG/DL (ref 30–160)
LDLC SERPL CALC-MCNC: 106.4 MG/DL (ref 63–159)
LYMPHOCYTES # BLD AUTO: 3 K/UL (ref 1–4.8)
LYMPHOCYTES NFR BLD: 31 % (ref 18–48)
MCH RBC QN AUTO: 26.5 PG (ref 27–31)
MCHC RBC AUTO-ENTMCNC: 28.7 G/DL (ref 32–36)
MCV RBC AUTO: 92 FL (ref 82–98)
MONOCYTES # BLD AUTO: 0.8 K/UL (ref 0.3–1)
MONOCYTES NFR BLD: 7.9 % (ref 4–15)
NEUTROPHILS # BLD AUTO: 5.2 K/UL (ref 1.8–7.7)
NEUTROPHILS NFR BLD: 54.4 % (ref 38–73)
NONHDLC SERPL-MCNC: 133 MG/DL
NRBC BLD-RTO: 0 /100 WBC
PLATELET # BLD AUTO: 315 K/UL (ref 150–350)
PMV BLD AUTO: 11.5 FL (ref 9.2–12.9)
POTASSIUM SERPL-SCNC: 4.4 MMOL/L (ref 3.5–5.1)
PROT SERPL-MCNC: 7.3 G/DL (ref 6–8.4)
RBC # BLD AUTO: 4.37 M/UL (ref 4–5.4)
SATURATED IRON: 11 % (ref 20–50)
SODIUM SERPL-SCNC: 140 MMOL/L (ref 136–145)
TOTAL IRON BINDING CAPACITY: 536 UG/DL (ref 250–450)
TRANSFERRIN SERPL-MCNC: 362 MG/DL (ref 200–375)
TRIGL SERPL-MCNC: 133 MG/DL (ref 30–150)
WBC # BLD AUTO: 9.6 K/UL (ref 3.9–12.7)

## 2019-07-20 PROCEDURE — 80053 COMPREHEN METABOLIC PANEL: CPT

## 2019-07-20 PROCEDURE — 80061 LIPID PANEL: CPT

## 2019-07-20 PROCEDURE — 85025 COMPLETE CBC W/AUTO DIFF WBC: CPT

## 2019-07-20 PROCEDURE — 83036 HEMOGLOBIN GLYCOSYLATED A1C: CPT

## 2019-07-20 PROCEDURE — 83540 ASSAY OF IRON: CPT

## 2019-07-20 PROCEDURE — 36415 COLL VENOUS BLD VENIPUNCTURE: CPT | Mod: PO

## 2019-07-26 ENCOUNTER — OFFICE VISIT (OUTPATIENT)
Dept: FAMILY MEDICINE | Facility: CLINIC | Age: 74
End: 2019-07-26
Payer: MEDICARE

## 2019-07-26 ENCOUNTER — TELEPHONE (OUTPATIENT)
Dept: FAMILY MEDICINE | Facility: CLINIC | Age: 74
End: 2019-07-26

## 2019-07-26 VITALS
SYSTOLIC BLOOD PRESSURE: 112 MMHG | WEIGHT: 175.13 LBS | HEART RATE: 72 BPM | HEIGHT: 62 IN | DIASTOLIC BLOOD PRESSURE: 62 MMHG | TEMPERATURE: 97 F | RESPIRATION RATE: 18 BRPM | BODY MASS INDEX: 32.23 KG/M2

## 2019-07-26 DIAGNOSIS — J96.10 CHRONIC RESPIRATORY FAILURE, UNSPECIFIED WHETHER WITH HYPOXIA OR HYPERCAPNIA: ICD-10-CM

## 2019-07-26 DIAGNOSIS — J44.9 CHRONIC OBSTRUCTIVE PULMONARY DISEASE, UNSPECIFIED COPD TYPE: Primary | Chronic | ICD-10-CM

## 2019-07-26 DIAGNOSIS — M54.30 SCIATICA, UNSPECIFIED LATERALITY: ICD-10-CM

## 2019-07-26 DIAGNOSIS — D50.0 IRON DEFICIENCY ANEMIA DUE TO CHRONIC BLOOD LOSS: ICD-10-CM

## 2019-07-26 DIAGNOSIS — Q17.8 SPLIT EAR LOBE: ICD-10-CM

## 2019-07-26 DIAGNOSIS — F41.9 ANXIETY: ICD-10-CM

## 2019-07-26 PROCEDURE — 99999 PR PBB SHADOW E&M-EST. PATIENT-LVL IV: ICD-10-PCS | Mod: PBBFAC,,, | Performed by: FAMILY MEDICINE

## 2019-07-26 PROCEDURE — 1101F PR PT FALLS ASSESS DOC 0-1 FALLS W/OUT INJ PAST YR: ICD-10-PCS | Mod: CPTII,S$GLB,, | Performed by: FAMILY MEDICINE

## 2019-07-26 PROCEDURE — 1101F PT FALLS ASSESS-DOCD LE1/YR: CPT | Mod: CPTII,S$GLB,, | Performed by: FAMILY MEDICINE

## 2019-07-26 PROCEDURE — 99499 RISK ADDL DX/OHS AUDIT: ICD-10-PCS | Mod: S$GLB,,, | Performed by: FAMILY MEDICINE

## 2019-07-26 PROCEDURE — 99499 UNLISTED E&M SERVICE: CPT | Mod: S$GLB,,, | Performed by: FAMILY MEDICINE

## 2019-07-26 PROCEDURE — 99999 PR PBB SHADOW E&M-EST. PATIENT-LVL IV: CPT | Mod: PBBFAC,,, | Performed by: FAMILY MEDICINE

## 2019-07-26 PROCEDURE — 99214 OFFICE O/P EST MOD 30 MIN: CPT | Mod: S$GLB,,, | Performed by: FAMILY MEDICINE

## 2019-07-26 PROCEDURE — 99214 PR OFFICE/OUTPT VISIT, EST, LEVL IV, 30-39 MIN: ICD-10-PCS | Mod: S$GLB,,, | Performed by: FAMILY MEDICINE

## 2019-07-26 RX ORDER — PREDNISONE 20 MG/1
20 TABLET ORAL DAILY
Qty: 5 TABLET | Refills: 0 | Status: SHIPPED | OUTPATIENT
Start: 2019-07-26 | End: 2019-07-31

## 2019-07-26 RX ORDER — FERROUS GLUCONATE 324(38)MG
324 TABLET ORAL
Qty: 90 TABLET | Refills: 1 | Status: ON HOLD | OUTPATIENT
Start: 2019-07-26 | End: 2022-01-25 | Stop reason: CLARIF

## 2019-07-26 NOTE — Clinical Note
Mahendra Cast, I've had this patient for several yrs. Challenging personality, argumentative, difficult. Not really sure how to classify her. Neuro considers it a dementia dx. Working thru the questions for a more exact psych dx is a challenge. She's very tangential, gruff. Significant anxiety, some of it related to dyspnea/COPD. Prev on lexapro/nortrip. During a hosp admission, they added abilify 2mg. She's been on it for ~6mos, and did really well initially; almost pleasant, able to carry on conversation. She's now complaining of intermittent musc twitches, facial tics, and daughter has noticed as well. Not persistent, and she can control them. She tried to show me one in the office where her R hand/arm started shaking and she slapped it back onto her lap. I feel like I have to assume it's a TD response from the abilify? Any recommendations on what else I could consider? We've tried to have her see psych, and she put up a lot of resistance. Thanks for any help! BILL

## 2019-07-26 NOTE — Clinical Note
Just an fyi on this pt in case she replies while I'm out next week... Checking with psych about possible TD response on abilify and recs for alternatives.

## 2019-07-26 NOTE — PROGRESS NOTES
Subjective:       Patient ID: Luda Peters is a 74 y.o. female.    Chief Complaint: Follow-up      Luda Peters is in the office for f/u and review. Accompanied by her daughter, Gabrielle, who is primary caregiver.    HPI  No updates to medical hx.   Stable COPD with nighttime supplemental oxygen use. Chronic ADRIANA. Pt maintained on lexapro 20, nortrip 25, abilify 2. She recalls that she has good and bad days. Anxiety seemingly provoked by dyspnea on exertion.   Has upcoming pulm appt for 6mo review.  Past Medical History:   Diagnosis Date    Anticoagulant long-term use     Anxiety 9/26/2016    Back pain     Closed fracture of second lumbar vertebra with routine healing 12/7/2015    COPD (chronic obstructive pulmonary disease) 9/26/2016    Emphysema of lung     Encounter for blood transfusion     Gastric ulcer     Hypertension      Requesting orders for new nasal cannula.   Declined offer for PT for ambulation.  Discussed re-eval with Dr Mccall's office if the R sciatica is increasing.   C/o occ twitching to lower face/jaw (bilateral), spontaneous resolves. occ also to hands and legs.     Current Outpatient Medications:     acetaminophen (TYLENOL) 500 MG tablet, Take 1,000 mg by mouth every 6 (six) hours as needed for Pain. , Disp: , Rfl:     acetylcysteine 100 mg/ml, 10%, (MUCOMYST) 100 mg/mL (10 %) nebulizer solution, Take 1 mL by nebulization every 6 (six) hours as needed., Disp: , Rfl:     albuterol (VENTOLIN HFA) 90 mcg/actuation inhaler, Inhale 2 puffs into the lungs every 6 (six) hours as needed for Wheezing. Rescue, Disp: , Rfl:     ARIPiprazole (ABILIFY) 2 MG Tab, TAKE 1 TABLET BY MOUTH ONCE DAILY, Disp: 90 tablet, Rfl: 0    budesonide (PULMICORT) 0.5 mg/2 mL nebulizer solution, USE 1 VIAL VIA NEBULIZER EVERY 12 HOURS, Disp: 120 mL, Rfl: 1    clopidogrel (PLAVIX) 75 mg tablet, Take 1 tablet (75 mg total) by mouth once daily., Disp: 90 tablet, Rfl: 1    diazePAM (VALIUM) 10 MG  Tab, Take 2 tablets (20 mg total) by mouth nightly as needed., Disp: 30 tablet, Rfl: 0    docusate sodium (COLACE) 50 MG capsule, Take 100 mg by mouth every evening. , Disp: , Rfl:     escitalopram oxalate (LEXAPRO) 20 MG tablet, TAKE 1 TABLET BY MOUTH EVERY EVENING, Disp: 90 tablet, Rfl: 0    fluticasone-umeclidin-vilanter (TRELEGY ELLIPTA) 100-62.5-25 mcg DsDv, Inhale 1 puff into the lungs once daily., Disp: , Rfl:     furosemide (LASIX) 40 MG tablet, TAKE 1 TABLET BY MOUTH TWICE DAILY, Disp: 180 tablet, Rfl: 1    gabapentin (NEURONTIN) 600 MG tablet, TAKE 1 TABLET BY MOUTH TWICE DAILY, Disp: 180 tablet, Rfl: 1    metoprolol tartrate (LOPRESSOR) 25 MG tablet, Take 1 tablet (25 mg total) by mouth 2 (two) times daily., Disp: 180 tablet, Rfl: 0    naproxen sodium (ANAPROX) 220 MG tablet, Take 220 mg by mouth 2 (two) times daily with meals., Disp: , Rfl:     nortriptyline (PAMELOR) 25 MG capsule, TAKE 1 CAPSULE BY MOUTH EVERY EVENING, Disp: 90 capsule, Rfl: 0    pantoprazole (PROTONIX) 40 MG tablet, TAKE 1 TABLET BY MOUTH TWICE DAILY, Disp: 180 tablet, Rfl: 1    spironolactone (ALDACTONE) 25 MG tablet, TAKE 1 TABLET BY MOUTH ONCE DAILY, Disp: 90 tablet, Rfl: 1    traMADol (ULTRAM) 50 mg tablet, Take 50 mg by mouth every 6 (six) hours as needed for Pain., Disp: , Rfl:     The 10-year ASCVD risk score (Brooten NICHOLAS Jr., et al., 2013) is: 11.2%    Values used to calculate the score:      Age: 74 years      Sex: Female      Is Non- : No      Diabetic: No      Tobacco smoker: No      Systolic Blood Pressure: 112 mmHg      Is BP treated: No      HDL Cholesterol: 56 mg/dL      Total Cholesterol: 189 mg/dL     Lab Results   Component Value Date    HGBA1C 5.5 07/20/2019    HGBA1C 5.7 (H) 03/21/2018    HGBA1C 5.9 02/16/2017     Lab Results   Component Value Date    LDLCALC 106.4 07/20/2019    CREATININE 1.0 07/20/2019   labs 2019 rev.    Review of Systems   Unable to perform ROS: Psychiatric  disorder   Constitutional: Negative for activity change, fatigue and unexpected weight change.   HENT: Negative for rhinorrhea and trouble swallowing.    Eyes: Negative for discharge and visual disturbance.   Respiratory: Positive for cough (frequent, productive of white sputum) and shortness of breath (continuous O2 by nc). Negative for chest tightness and wheezing.    Cardiovascular: Negative for chest pain, palpitations and leg swelling.   Gastrointestinal: Negative for abdominal pain, blood in stool, constipation (denies) and diarrhea.   Endocrine: Negative for polydipsia and polyuria.   Genitourinary: Negative for difficulty urinating and hematuria.   Musculoskeletal: Positive for arthralgias. Negative for joint swelling.   Skin: Positive for color change (R leg darker in color from ankle to foot when compared to L). Negative for rash and wound.   Neurological: Positive for weakness (ambulates with a walker). Negative for headaches.   Psychiatric/Behavioral: Positive for agitation and sleep disturbance. Negative for confusion and dysphoric mood. The patient is nervous/anxious.            Objective:      Physical Exam   Constitutional: She is oriented to person, place, and time. She appears well-developed and well-nourished. No distress.   HENT:   Head: Normocephalic and atraumatic.   Eyes: Conjunctivae are normal. Right eye exhibits no discharge. Left eye exhibits no discharge. No scleral icterus.   Neck: Normal range of motion. Neck supple.   Cardiovascular: Normal rate.   Pulmonary/Chest: Effort normal. Accessory muscle usage: for deep insp. No respiratory distress. She has decreased breath sounds. She has no wheezes.   Abdominal: Soft. She exhibits no distension.   Musculoskeletal: Normal range of motion. She exhibits no edema (trace).   Pt examined in chair   Lymphadenopathy:     She has no cervical adenopathy.   Neurological: She is alert and oriented to person, place, and time. No cranial nerve deficit.    Pt displayed a R arm tremor in visit that she stopped by using L arm to hold R in lap.   Skin: Skin is warm and dry. No rash noted.   Psychiatric: Her mood appears anxious. Her affect is labile. Her speech is tangential (moderate). She is agitated (mild, but cooperative). She is not combative.   Nursing note and vitals reviewed.          Screening recommendations appropriate to age and health status were reviewed.    Assessment & Plan:    Chronic obstructive pulmonary disease, unspecified COPD type  -     predniSONE (DELTASONE) 20 MG tablet; Take 1 tablet (20 mg total) by mouth once daily. for 5 days  Dispense: 5 tablet; Refill: 0  -     Ambulatory referral to Home Health  Add pred x 5 days. Resume alb/ipra nebs over the weekend. Schedule (due) f/u with pulm.   Chronic respiratory failure, unspecified whether with hypoxia or hypercapnia  -     predniSONE (DELTASONE) 20 MG tablet; Take 1 tablet (20 mg total) by mouth once daily. for 5 days  Dispense: 5 tablet; Refill: 0  -     Ambulatory referral to Home Health  Cont supplemental oxygen nightly and prn.   Iron deficiency anemia due to chronic blood loss  -     Occult blood x 1, stool; Future; Expected date: 07/26/2019  -     Urinalysis; Future  -     CBC auto differential; Future; Expected date: 07/26/2019  -     Basic metabolic panel; Future; Expected date: 07/26/2019  -     Ferritin; Future; Expected date: 07/26/2019  -     Iron and TIBC; Future; Expected date: 07/26/2019  -     ferrous gluconate (FERGON) 324 MG tablet; Take 1 tablet (324 mg total) by mouth daily with breakfast.  Dispense: 90 tablet; Refill: 1  -     Ambulatory referral to Home Health  Discussed iron supplementation once daily. Pt had concerns about taking, but does not want iron infusion. Discussed supplement and repeat lab 3mos.  Split ear lobe  -     Ambulatory referral to Dermatology  For review with derm.  Sciatica, unspecified laterality  -     Ambulatory referral to Home Health  Pt prev  saw pain/bianca, but does not want injection at this time. Agrees to PT with prev agency/therapist as they were able to help her.  Anxiety  Consider TD response from [facility] with re: to twitches reported. Will discuss alternates with psych.

## 2019-07-26 NOTE — TELEPHONE ENCOUNTER
Let daughter, Gabrielle, know that I discussed the twitching and tics with psychiatry. They recommend trying a lower dose of abilify (cut tablets in half, 1mg), and they recommended an alternative to try if it continues.

## 2019-07-26 NOTE — TELEPHONE ENCOUNTER
----- Message from Trevon Yin MD sent at 7/26/2019  3:16 PM CDT -----  Hi- this seems like a difficult case- yes, I agree with you with your assumption of TD on abilify- I would try a decreased dose (I apologize, I'm on call and did not review what dose she's currently on)- you can also add on some cogentin although the research and ebm on that is spotty- if the TD is significant enough to be problematic to the pt/daughter you could try a newer antipsychotic as a trial (vraylar 1.5mg- they have discount cards, but it's also getting approved after failure of 1 prior trial)? Let me know if any of these interventions lead to positive changes.   -trevon  ----- Message -----  From: Nuzhat Kline MD  Sent: 7/26/2019   3:13 PM  To: Trevon Yin MD    Hi Trevon, I've had this patient for several yrs. Challenging personality, argumentative, difficult. Not really sure how to classify her. Neuro considers it a dementia dx. Working thru the questions for a more exact psych dx is a challenge. She's very tangential, gruff. Significant anxiety, some of it related to dyspnea/COPD. Prev on lexapro/nortrip. During a hosp admission, they added abilify 2mg. She's been on it for ~6mos, and did really well initially; almost pleasant, able to carry on conversation.   She's now complaining of intermittent musc twitches, facial tics, and daughter has noticed as well. Not persistent, and she can control them. She tried to show me one in the office where her R hand/arm started shaking and she slapped it back onto her lap.   I feel like I have to assume it's a TD response from the abilify? Any recommendations on what else I could consider? We've tried to have her see psych, and she put up a lot of resistance.   Thanks for any help! BILL

## 2019-07-26 NOTE — TELEPHONE ENCOUNTER
Notified daughter to cut abilify in half and give to patient. Advised to call if this is not working.

## 2019-08-08 RX ORDER — CLOPIDOGREL BISULFATE 75 MG/1
TABLET ORAL
Qty: 90 TABLET | Refills: 1 | Status: SHIPPED | OUTPATIENT
Start: 2019-08-08 | End: 2020-02-21

## 2019-09-12 RX ORDER — METOPROLOL TARTRATE 25 MG/1
TABLET, FILM COATED ORAL
Qty: 180 TABLET | Refills: 1 | Status: SHIPPED | OUTPATIENT
Start: 2019-09-12 | End: 2020-03-13 | Stop reason: SDUPTHER

## 2019-09-16 RX ORDER — NORTRIPTYLINE HYDROCHLORIDE 25 MG/1
CAPSULE ORAL
Qty: 90 CAPSULE | Refills: 0 | Status: SHIPPED | OUTPATIENT
Start: 2019-09-16 | End: 2019-12-23

## 2019-09-16 RX ORDER — ESCITALOPRAM OXALATE 20 MG/1
TABLET ORAL
Qty: 90 TABLET | Refills: 0 | Status: SHIPPED | OUTPATIENT
Start: 2019-09-16 | End: 2019-12-23

## 2019-09-16 RX ORDER — ARIPIPRAZOLE 2 MG/1
TABLET ORAL
Qty: 90 TABLET | Refills: 0 | Status: SHIPPED | OUTPATIENT
Start: 2019-09-16 | End: 2019-12-23

## 2019-11-21 RX ORDER — FUROSEMIDE 40 MG/1
TABLET ORAL
Qty: 180 TABLET | Refills: 0 | Status: SHIPPED | OUTPATIENT
Start: 2019-11-21 | End: 2020-02-24

## 2019-11-21 RX ORDER — PANTOPRAZOLE SODIUM 40 MG/1
TABLET, DELAYED RELEASE ORAL
Qty: 180 TABLET | Refills: 0 | Status: SHIPPED | OUTPATIENT
Start: 2019-11-21 | End: 2020-02-24

## 2019-11-21 RX ORDER — SPIRONOLACTONE 25 MG/1
TABLET ORAL
Qty: 90 TABLET | Refills: 0 | Status: SHIPPED | OUTPATIENT
Start: 2019-11-21 | End: 2020-02-24

## 2019-11-26 ENCOUNTER — PATIENT OUTREACH (OUTPATIENT)
Dept: ADMINISTRATIVE | Facility: HOSPITAL | Age: 74
End: 2019-11-26

## 2019-12-10 RX ORDER — GABAPENTIN 600 MG/1
TABLET ORAL
Qty: 180 TABLET | Refills: 0 | Status: SHIPPED | OUTPATIENT
Start: 2019-12-10 | End: 2020-03-13 | Stop reason: SDUPTHER

## 2019-12-23 RX ORDER — ARIPIPRAZOLE 2 MG/1
TABLET ORAL
Qty: 90 TABLET | Refills: 1 | Status: SHIPPED | OUTPATIENT
Start: 2019-12-23 | End: 2021-05-06

## 2019-12-23 RX ORDER — NORTRIPTYLINE HYDROCHLORIDE 25 MG/1
CAPSULE ORAL
Qty: 90 CAPSULE | Refills: 1 | Status: SHIPPED | OUTPATIENT
Start: 2019-12-23 | End: 2020-07-23

## 2019-12-23 RX ORDER — ESCITALOPRAM OXALATE 20 MG/1
TABLET ORAL
Qty: 90 TABLET | Refills: 1 | Status: SHIPPED | OUTPATIENT
Start: 2019-12-23 | End: 2020-09-17

## 2020-02-21 RX ORDER — CLOPIDOGREL BISULFATE 75 MG/1
TABLET ORAL
Qty: 90 TABLET | Refills: 1 | Status: SHIPPED | OUTPATIENT
Start: 2020-02-21 | End: 2020-02-24

## 2020-02-24 RX ORDER — PANTOPRAZOLE SODIUM 40 MG/1
TABLET, DELAYED RELEASE ORAL
Qty: 180 TABLET | Refills: 1 | Status: SHIPPED | OUTPATIENT
Start: 2020-02-24 | End: 2020-08-24

## 2020-02-24 RX ORDER — FUROSEMIDE 40 MG/1
TABLET ORAL
Qty: 180 TABLET | Refills: 1 | Status: SHIPPED | OUTPATIENT
Start: 2020-02-24 | End: 2020-08-24

## 2020-02-24 RX ORDER — SPIRONOLACTONE 25 MG/1
TABLET ORAL
Qty: 90 TABLET | Refills: 1 | Status: SHIPPED | OUTPATIENT
Start: 2020-02-24 | End: 2020-08-24

## 2020-02-24 RX ORDER — PANTOPRAZOLE SODIUM 40 MG/1
TABLET, DELAYED RELEASE ORAL
Qty: 180 TABLET | Refills: 1 | Status: SHIPPED | OUTPATIENT
Start: 2020-02-24 | End: 2020-03-04 | Stop reason: SDUPTHER

## 2020-02-24 RX ORDER — SPIRONOLACTONE 25 MG/1
TABLET ORAL
Qty: 90 TABLET | Refills: 1 | Status: SHIPPED | OUTPATIENT
Start: 2020-02-24 | End: 2021-03-18

## 2020-02-24 RX ORDER — FUROSEMIDE 40 MG/1
TABLET ORAL
Qty: 180 TABLET | Refills: 1 | Status: SHIPPED | OUTPATIENT
Start: 2020-02-24 | End: 2020-03-04 | Stop reason: SDUPTHER

## 2020-02-24 RX ORDER — CLOPIDOGREL BISULFATE 75 MG/1
TABLET ORAL
Qty: 90 TABLET | Refills: 1 | Status: SHIPPED | OUTPATIENT
Start: 2020-02-24 | End: 2020-08-24

## 2020-03-03 ENCOUNTER — TELEPHONE (OUTPATIENT)
Dept: FAMILY MEDICINE | Facility: CLINIC | Age: 75
End: 2020-03-03

## 2020-03-03 NOTE — TELEPHONE ENCOUNTER
Spoke with pt's daughter, knee is red and swollen, has improved throughout the day but she would like it checked out. Appt scheduled.

## 2020-03-03 NOTE — TELEPHONE ENCOUNTER
----- Message from Alexander Gonzalez sent at 3/3/2020  3:01 PM CST -----  Contact: pt  Type:  Patient Returning Call    Who Called:  pt  Who Left Message for Patient:  Lynda  Does the patient know what this is regarding?:    Best Call Back Number:  [phone]  Additional Information:

## 2020-03-03 NOTE — TELEPHONE ENCOUNTER
----- Message from Chandni Nunez sent at 3/3/2020  9:01 AM CST -----  Contact: daughter  Type: Needs Medical Advice    Who Called:Gabrielle  Chuy Call Back Number: [phone]  Additional Information: the patient daughter would like to bring her mother in for a swollen leg and refuses to wait until mid April please call her back to advise she wants something late evening

## 2020-03-03 NOTE — TELEPHONE ENCOUNTER
----- Message from Cameron Armenta sent at 3/3/2020 11:24 AM CST -----  Contact: Rosalee/Gabrielle  Unsuccessful IM sent to office.  Gabrielle called in and stated she missed a call back from Elvira from earlier today about trying to get patient in today 3/3/20, last appointment of the day if possible,  to see Dr. Kline.  Patients right knee is red & swollen.    Gabrielle's call back number is [phone] (work)

## 2020-03-04 ENCOUNTER — HOSPITAL ENCOUNTER (OUTPATIENT)
Dept: RADIOLOGY | Facility: HOSPITAL | Age: 75
Discharge: HOME | End: 2020-03-04
Attending: FAMILY MEDICINE
Payer: MEDICARE

## 2020-03-04 ENCOUNTER — OFFICE VISIT (OUTPATIENT)
Dept: FAMILY MEDICINE | Facility: CLINIC | Age: 75
End: 2020-03-04
Payer: MEDICARE

## 2020-03-04 VITALS
DIASTOLIC BLOOD PRESSURE: 62 MMHG | WEIGHT: 186.81 LBS | BODY MASS INDEX: 34.38 KG/M2 | SYSTOLIC BLOOD PRESSURE: 104 MMHG | HEART RATE: 82 BPM | HEIGHT: 62 IN

## 2020-03-04 DIAGNOSIS — J96.10 CHRONIC RESPIRATORY FAILURE, UNSPECIFIED WHETHER WITH HYPOXIA OR HYPERCAPNIA: ICD-10-CM

## 2020-03-04 DIAGNOSIS — R14.0 ABDOMINAL DISTENSION: ICD-10-CM

## 2020-03-04 DIAGNOSIS — M25.561 ACUTE PAIN OF RIGHT KNEE: ICD-10-CM

## 2020-03-04 DIAGNOSIS — M25.561 ACUTE PAIN OF RIGHT KNEE: Primary | ICD-10-CM

## 2020-03-04 DIAGNOSIS — J44.9 CHRONIC OBSTRUCTIVE PULMONARY DISEASE, UNSPECIFIED COPD TYPE: ICD-10-CM

## 2020-03-04 DIAGNOSIS — R10.84 GENERALIZED ABDOMINAL PAIN: ICD-10-CM

## 2020-03-04 DIAGNOSIS — M79.604 PAIN IN RIGHT LEG: ICD-10-CM

## 2020-03-04 PROCEDURE — 73562 X-RAY EXAM OF KNEE 3: CPT | Mod: 26,LT,, | Performed by: RADIOLOGY

## 2020-03-04 PROCEDURE — 99214 OFFICE O/P EST MOD 30 MIN: CPT | Mod: S$GLB,,, | Performed by: FAMILY MEDICINE

## 2020-03-04 PROCEDURE — 99999 PR PBB SHADOW E&M-EST. PATIENT-LVL III: ICD-10-PCS | Mod: PBBFAC,,, | Performed by: FAMILY MEDICINE

## 2020-03-04 PROCEDURE — 73562 XR KNEE ORTHO RIGHT WITH FLEXION: ICD-10-PCS | Mod: 26,LT,, | Performed by: RADIOLOGY

## 2020-03-04 PROCEDURE — 1159F PR MEDICATION LIST DOCUMENTED IN MEDICAL RECORD: ICD-10-PCS | Mod: S$GLB,,, | Performed by: FAMILY MEDICINE

## 2020-03-04 PROCEDURE — 73562 X-RAY EXAM OF KNEE 3: CPT | Mod: TC,PN,LT

## 2020-03-04 PROCEDURE — 1125F PR PAIN SEVERITY QUANTIFIED, PAIN PRESENT: ICD-10-PCS | Mod: S$GLB,,, | Performed by: FAMILY MEDICINE

## 2020-03-04 PROCEDURE — 1159F MED LIST DOCD IN RCRD: CPT | Mod: S$GLB,,, | Performed by: FAMILY MEDICINE

## 2020-03-04 PROCEDURE — 99999 PR PBB SHADOW E&M-EST. PATIENT-LVL III: CPT | Mod: PBBFAC,,, | Performed by: FAMILY MEDICINE

## 2020-03-04 PROCEDURE — 1125F AMNT PAIN NOTED PAIN PRSNT: CPT | Mod: S$GLB,,, | Performed by: FAMILY MEDICINE

## 2020-03-04 PROCEDURE — 73564 XR KNEE ORTHO RIGHT WITH FLEXION: ICD-10-PCS | Mod: 26,RT,, | Performed by: RADIOLOGY

## 2020-03-04 PROCEDURE — 73564 X-RAY EXAM KNEE 4 OR MORE: CPT | Mod: 26,RT,, | Performed by: RADIOLOGY

## 2020-03-04 PROCEDURE — 1101F PR PT FALLS ASSESS DOC 0-1 FALLS W/OUT INJ PAST YR: ICD-10-PCS | Mod: CPTII,S$GLB,, | Performed by: FAMILY MEDICINE

## 2020-03-04 PROCEDURE — 1101F PT FALLS ASSESS-DOCD LE1/YR: CPT | Mod: CPTII,S$GLB,, | Performed by: FAMILY MEDICINE

## 2020-03-04 PROCEDURE — 99214 PR OFFICE/OUTPT VISIT, EST, LEVL IV, 30-39 MIN: ICD-10-PCS | Mod: S$GLB,,, | Performed by: FAMILY MEDICINE

## 2020-03-04 RX ORDER — DICLOFENAC SODIUM 10 MG/G
2 GEL TOPICAL 2 TIMES DAILY
Qty: 100 G | Refills: 1 | Status: SHIPPED | OUTPATIENT
Start: 2020-03-04 | End: 2020-08-03 | Stop reason: SDUPTHER

## 2020-03-06 ENCOUNTER — TELEPHONE (OUTPATIENT)
Dept: FAMILY MEDICINE | Facility: CLINIC | Age: 75
End: 2020-03-06

## 2020-03-06 PROBLEM — J96.10 CHRONIC RESPIRATORY FAILURE: Status: ACTIVE | Noted: 2020-03-06

## 2020-03-06 NOTE — PROGRESS NOTES
Subjective:       Patient ID: Luda Peters is a 74 y.o. female.    Chief Complaint: rt knee pain (rt knee pain with redness and swelling)    HPI  Patient in clinic for review of chronic R knee pain. Accompanied by her daughter Gabrielle with whom she lives.   They note that she has been doing relatively well lately. Breathing is maintained, no recent exacerbations. She has had some continued weight gain (truncal) that is particularly bothersome to her. We'd previously discussed common dietary contributors, but as she notes her diet is more restricted than previous.    She c/o generalized abdominal pain. Tenderness with any palpation or touch. Bowels and bladder function are ok. No gerd c/o.  She is frustrated by need to continue use of walker for ambulation.   Lower back is stable. Previous imaging noted for old compression fx and deg disc findings. She did wear an lso for several months following an acute compression fx, had LIN with pain/bianca, then declined further intervention. She has maintained on gabapentin as the tramadol was felt to be too risky given respiratory and ambulatory challenges.    Review of Systems:  Review of Systems   Unable to perform ROS: Psychiatric disorder   Constitutional: Negative for fatigue and unexpected weight change.   HENT: Negative for rhinorrhea and trouble swallowing.    Eyes: Negative for discharge and visual disturbance.   Respiratory: Positive for shortness of breath (O2 by nc). Negative for cough (frequent, productive of white sputum), chest tightness and wheezing.    Cardiovascular: Negative for chest pain, palpitations and leg swelling.   Gastrointestinal: Positive for abdominal pain. Negative for blood in stool, constipation (denies) and diarrhea.   Genitourinary: Negative for difficulty urinating and hematuria.   Musculoskeletal: Positive for arthralgias. Negative for joint swelling.        R>L lower leg ache   Skin: Negative for color change and rash.  "  Neurological: Positive for weakness (ambulates with a walker). Negative for headaches.   Psychiatric/Behavioral: Positive for agitation and sleep disturbance. Negative for confusion and dysphoric mood. The patient is nervous/anxious.        Objective:     Vitals:    03/04/20 1359   BP: 104/62   BP Location: Right arm   Patient Position: Sitting   BP Method: Large (Manual)   Pulse: 82   Weight: 84.8 kg (186 lb 13.4 oz)   Height: 5' 2" (1.575 m)          Physical Exam   Constitutional: She is oriented to person, place, and time. She appears well-developed and well-nourished. No distress.   HENT:   Head: Normocephalic and atraumatic.   Eyes: Conjunctivae are normal. Right eye exhibits no discharge. Left eye exhibits no discharge. No scleral icterus.   Neck: Normal range of motion. Neck supple.   Cardiovascular: Normal rate and regular rhythm.   Pulmonary/Chest: Effort normal. No accessory muscle usage. No respiratory distress. She has decreased breath sounds. She has no wheezes.   Abdominal: Soft. She exhibits no distension. There is tenderness (generalized, exam limited as patient expressed pain with even lightest touch).   Musculoskeletal: Normal range of motion. She exhibits edema (trace pitting to ble).   Pt examined in chair   Lymphadenopathy:     She has no cervical adenopathy.   Neurological: She is alert and oriented to person, place, and time. No cranial nerve deficit.   Skin: Skin is warm and dry. No rash noted.   Psychiatric: She has a normal mood and affect. Her speech is tangential (mild-moderate). She is agitated (mild, but cooperative).   She was  personable at today's visit, attempts at humor, willing to allow conversation to include her daughter.   Nursing note and vitals reviewed.        Assessment & Plan:  Acute pain of right knee  -     X-ray Knee Ortho Right with Flexion; Future; Expected date: 03/04/2020  -     US Lower Extremity Veins Right; Future; Expected date: 03/04/2020  -     diclofenac " sodium (VOLTAREN) 1 % Gel; Apply 2 g topically 2 (two) times daily.  Dispense: 100 g; Refill: 1  -     Sedimentation rate; Future; Expected date: 03/04/2020  -     Uric acid; Future; Expected date: 03/04/2020  Update imaging in clinic.   Trial of topical voltaren for relief.   Update lab to include sed rate and uric acid.  Abdominal distension  -     CT Abdomen Pelvis With Contrast; Future; Expected date: 03/04/2020  -     Cancel: Creatinine, serum; Future; Expected date: 03/04/2020  Truncal weight gain with pain noted on exam. Pain limited exam, but patient notes chronic discomfort. Of note, no previous colon eval on file as patient refused.  Generalized abdominal pain  -     CT Abdomen Pelvis With Contrast; Future; Expected date: 03/04/2020  -     Cancel: Creatinine, serum; Future; Expected date: 03/04/2020    Pain in right leg   -     US Lower Extremity Veins Right; Future; Expected date: 03/04/2020  For review.

## 2020-03-06 NOTE — TELEPHONE ENCOUNTER
----- Message from Dejah Schrader sent at 3/5/2020  1:48 AM CST -----  Regarding: Lab Client Services  Good Afternoon,    My name is Dejah Schrader I work in the Lab Client Services. We had a problem with some lab work on this patient. If someone from your office could call us at [phone] or ext. [phone] that would be great. Anyone in my department can help.  Thank you

## 2020-03-10 ENCOUNTER — TELEPHONE (OUTPATIENT)
Dept: FAMILY MEDICINE | Facility: CLINIC | Age: 75
End: 2020-03-10

## 2020-03-10 ENCOUNTER — HOSPITAL ENCOUNTER (OUTPATIENT)
Dept: RADIOLOGY | Facility: HOSPITAL | Age: 75
Discharge: HOME | End: 2020-03-10
Attending: FAMILY MEDICINE
Payer: MEDICARE

## 2020-03-10 DIAGNOSIS — M79.604 PAIN IN RIGHT LEG: ICD-10-CM

## 2020-03-10 DIAGNOSIS — E79.0 ELEVATED URIC ACID IN BLOOD: ICD-10-CM

## 2020-03-10 DIAGNOSIS — Z12.31 SCREENING MAMMOGRAM FOR HIGH-RISK PATIENT: Primary | ICD-10-CM

## 2020-03-10 DIAGNOSIS — R10.84 GENERALIZED ABDOMINAL PAIN: ICD-10-CM

## 2020-03-10 DIAGNOSIS — R91.1 LUNG NODULE: ICD-10-CM

## 2020-03-10 DIAGNOSIS — R14.0 ABDOMINAL DISTENSION: ICD-10-CM

## 2020-03-10 DIAGNOSIS — M25.561 ACUTE PAIN OF RIGHT KNEE: ICD-10-CM

## 2020-03-10 PROCEDURE — A9698 NON-RAD CONTRAST MATERIALNOC: HCPCS | Mod: PO | Performed by: FAMILY MEDICINE

## 2020-03-10 PROCEDURE — 93971 EXTREMITY STUDY: CPT | Mod: TC,PO,RT

## 2020-03-10 PROCEDURE — 25500020 PHARM REV CODE 255: Mod: PO | Performed by: FAMILY MEDICINE

## 2020-03-10 PROCEDURE — 74176 CT ABD & PELVIS W/O CONTRAST: CPT | Mod: 26,,, | Performed by: RADIOLOGY

## 2020-03-10 PROCEDURE — 93971 US LOWER EXTREMITY VEINS RIGHT: ICD-10-PCS | Mod: 26,RT,, | Performed by: RADIOLOGY

## 2020-03-10 PROCEDURE — 93971 EXTREMITY STUDY: CPT | Mod: 26,RT,, | Performed by: RADIOLOGY

## 2020-03-10 PROCEDURE — 74176 CT ABDOMEN PELVIS WITHOUT CONTRAST: ICD-10-PCS | Mod: 26,,, | Performed by: RADIOLOGY

## 2020-03-10 PROCEDURE — 74176 CT ABD & PELVIS W/O CONTRAST: CPT | Mod: TC,PO

## 2020-03-10 RX ORDER — ALLOPURINOL 100 MG/1
100 TABLET ORAL DAILY
Qty: 30 TABLET | Refills: 1 | Status: SHIPPED | OUTPATIENT
Start: 2020-03-10 | End: 2020-03-11

## 2020-03-10 RX ORDER — METRONIDAZOLE 500 MG/1
500 TABLET ORAL EVERY 8 HOURS
Qty: 21 TABLET | Refills: 0 | Status: SHIPPED | OUTPATIENT
Start: 2020-03-10 | End: 2021-03-18

## 2020-03-10 RX ORDER — PREDNISONE 20 MG/1
20 TABLET ORAL DAILY
Qty: 7 TABLET | Refills: 0 | Status: SHIPPED | OUTPATIENT
Start: 2020-03-10 | End: 2020-07-22 | Stop reason: SDUPTHER

## 2020-03-10 RX ADMIN — BARIUM SULFATE 900 ML: 20 SUSPENSION ORAL at 01:03

## 2020-03-10 NOTE — TELEPHONE ENCOUNTER
CT abd/pelvis and labs reviewed with daughter.  1. Constipation with diverticulosis/itis miralax 1 capful daily in addition to stool softener. Flagyl TID for 1 week for possible early diverticulitis.   2. Message sent to vascular re: aorta findings. Reviewed size with daughter. Pt already rollator dependent, no lifting.   3. 7mm lung nodule. Repeat CT chest in 6mos for pulm nodule.  4. elev uric acid, >8. Review gout diet. Add allopurinol with pred for acute flare that can occur when starting prophy. Repeat lab 1mo.  5. Discussed coronary calc on imaging. They saw cards last year and recalled neg eval, reassuring echo. Not interested in intervention unless absolutely needed.  6. mmg ordered.    Daughter notes that oral pain medications are not needed as she is concerned about falls risks, mental status change.     Phone call for results review, medications, orders and f/u, >20mins.    Nursing: please schedule lab in 1mo. mmg at lab appt. CT chest 6mos.

## 2020-03-11 RX ORDER — ALLOPURINOL 100 MG/1
TABLET ORAL
Qty: 90 TABLET | Refills: 1 | Status: SHIPPED | OUTPATIENT
Start: 2020-03-11 | End: 2020-05-18

## 2020-03-13 RX ORDER — METOPROLOL TARTRATE 25 MG/1
TABLET, FILM COATED ORAL
Qty: 180 TABLET | Refills: 1 | Status: SHIPPED | OUTPATIENT
Start: 2020-03-13 | End: 2020-10-09

## 2020-03-13 RX ORDER — GABAPENTIN 600 MG/1
TABLET ORAL
Qty: 180 TABLET | Refills: 0 | Status: SHIPPED | OUTPATIENT
Start: 2020-03-13 | End: 2020-06-08

## 2020-04-30 ENCOUNTER — PATIENT MESSAGE (OUTPATIENT)
Dept: FAMILY MEDICINE | Facility: CLINIC | Age: 75
End: 2020-04-30

## 2020-04-30 ENCOUNTER — TELEPHONE (OUTPATIENT)
Dept: FAMILY MEDICINE | Facility: CLINIC | Age: 75
End: 2020-04-30

## 2020-04-30 ENCOUNTER — OFFICE VISIT (OUTPATIENT)
Dept: FAMILY MEDICINE | Facility: CLINIC | Age: 75
End: 2020-04-30
Payer: MEDICARE

## 2020-04-30 VITALS
BODY MASS INDEX: 33.73 KG/M2 | DIASTOLIC BLOOD PRESSURE: 64 MMHG | SYSTOLIC BLOOD PRESSURE: 128 MMHG | WEIGHT: 184.38 LBS | HEART RATE: 100 BPM

## 2020-04-30 DIAGNOSIS — M79.661 PAIN AND SWELLING OF RIGHT LOWER LEG: Primary | ICD-10-CM

## 2020-04-30 DIAGNOSIS — M79.89 PAIN AND SWELLING OF RIGHT LOWER LEG: Primary | ICD-10-CM

## 2020-04-30 DIAGNOSIS — J30.2 SEASONAL ALLERGIC RHINITIS, UNSPECIFIED TRIGGER: ICD-10-CM

## 2020-04-30 DIAGNOSIS — J96.10 CHRONIC RESPIRATORY FAILURE, UNSPECIFIED WHETHER WITH HYPOXIA OR HYPERCAPNIA: ICD-10-CM

## 2020-04-30 PROCEDURE — 1101F PR PT FALLS ASSESS DOC 0-1 FALLS W/OUT INJ PAST YR: ICD-10-PCS | Mod: CPTII,95,, | Performed by: FAMILY MEDICINE

## 2020-04-30 PROCEDURE — 1159F PR MEDICATION LIST DOCUMENTED IN MEDICAL RECORD: ICD-10-PCS | Mod: 95,,, | Performed by: FAMILY MEDICINE

## 2020-04-30 PROCEDURE — 99213 OFFICE O/P EST LOW 20 MIN: CPT | Mod: 95,,, | Performed by: FAMILY MEDICINE

## 2020-04-30 PROCEDURE — 1159F MED LIST DOCD IN RCRD: CPT | Mod: 95,,, | Performed by: FAMILY MEDICINE

## 2020-04-30 PROCEDURE — 1101F PT FALLS ASSESS-DOCD LE1/YR: CPT | Mod: CPTII,95,, | Performed by: FAMILY MEDICINE

## 2020-04-30 PROCEDURE — 99213 PR OFFICE/OUTPT VISIT, EST, LEVL III, 20-29 MIN: ICD-10-PCS | Mod: 95,,, | Performed by: FAMILY MEDICINE

## 2020-04-30 NOTE — Clinical Note
Please schedule pt with vascular for unilateral, peripheral edema, neg dvt us. A venous insufficiency study is ordered if needed prior to the appt. Daughter would prefer vv consult if before may 15.

## 2020-04-30 NOTE — PROGRESS NOTES
Subjective:       Patient ID: Luda Peters is a 74 y.o. female.    Chief Complaint: No chief complaint on file.      The patient location is: home  The chief complaint leading to consultation is: R leg swelling  Visit type: Virtual visit with synchronous audio and video  Total time spent with patient: 20mins  Each patient to whom he or she provides medical services by telemedicine is:  (1) informed of the relationship between the physician and patient and the respective role of any other health care provider with respect to management of the patient; and (2) notified that he or she may decline to receive medical services by telemedicine and may withdraw from such care at any time.    Notes:   Still having pitting edema to R foot. This has lingered without significant improvement.Previous us neg for DVT. On lasix bid. Daughter manages diet and restricts salt. Patient otherwise has been feeling well. She has some swelling of the R knee (chronic), but this is unchanged. Swelling normalizes mid calf before edema is noticeable.     Increased sneezing and coughing. She's spent more time outdoors.     Review of Systems   Cardiovascular: Positive for leg swelling. Negative for palpitations.       Objective:        Physical Exam   Constitutional: She is oriented to person, place, and time. She appears well-developed and well-nourished. No distress.   HENT:   Head: Normocephalic and atraumatic.   Eyes: Conjunctivae are normal. Right eye exhibits no discharge. Left eye exhibits no discharge. No scleral icterus.   Pulmonary/Chest: Effort normal. No respiratory distress.   Musculoskeletal: She exhibits edema (see media images; estimated at 1+ pitting, R pedal edema).   Neurological: She is alert and oriented to person, place, and time.   Skin: Skin is warm and dry.   Psychiatric: She has a normal mood and affect. Her behavior is normal.   Nursing note and vitals reviewed.          Assessment:   Pain and swelling of right  lower leg  -     Ultrasound Lower Extremity Veins Insuf Right (Cupid Only); Future  -     Ambulatory referral/consult to Vascular Surgery; Future; Expected date: 05/07/2020  persistent with negative dvt us, lasix bid, unilateral. Was told at some point to not use compressions (no doc found). Recommended review with vascular.  Continue supplemental O2 at night and prn.   Seasonal allergic rhinitis, unspecified trigger  Comments:  claritin 10mg daily  claritin daily for AR sx. Avoid sudafed.  Chronic respiratory failure, unspecified whether with hypoxia or hypercapnia  known hx, no pulse ox at home, but maintains a blue'barbara tone to fingers on both hands. Oxygen and regimen per pulm.         Patient aware of limitations to assessment and exam of telemedicine. Deemed appropriate at this time given current covid-19 concerns.

## 2020-04-30 NOTE — TELEPHONE ENCOUNTER
----- Message from Brittney Dong sent at 4/30/2020  9:34 AM CDT -----  Contact: Daughter  Type: Needs Medical Advice  Who Called:  Gabrielle Vera Call Back Number:   Additional Information: Per daughter would like to speak with someone regarding her mothers care and maintenance of her medication

## 2020-04-30 NOTE — TELEPHONE ENCOUNTER
Spoke with pt's daughter, she states pt has been having some Pitting edema, they are using massage and elevation.   voltaren has been helping pt with her knee pain using one to two times daily  She does have a persistent cough and sneezing, no fever. They do not have a pulse ox at home but are monitoring her respirations which seem to be increase but not alarming  Scheduled VV with pt's daughter to discuss concerns with Dr. Kline.

## 2020-05-01 ENCOUNTER — TELEPHONE (OUTPATIENT)
Dept: VASCULAR SURGERY | Facility: CLINIC | Age: 75
End: 2020-05-01

## 2020-05-01 ENCOUNTER — TELEPHONE (OUTPATIENT)
Dept: FAMILY MEDICINE | Facility: CLINIC | Age: 75
End: 2020-05-01

## 2020-05-01 DIAGNOSIS — M79.661 PAIN AND SWELLING OF RIGHT LOWER LEG: Primary | ICD-10-CM

## 2020-05-01 DIAGNOSIS — M79.89 PAIN AND SWELLING OF RIGHT LOWER LEG: Primary | ICD-10-CM

## 2020-05-01 NOTE — TELEPHONE ENCOUNTER
----- Message from Brittney Dong sent at 5/1/2020  9:45 AM CDT -----  Contact: Daughter  Type:  Patient Returning Call    Who Called:  Daughter  Who Left Message for Patient:  Lilly   Does the patient know what this is regarding?: unsure  Best Call Back Number:    Additional Information:  Please advise-thank you

## 2020-05-01 NOTE — TELEPHONE ENCOUNTER
----- Message from Cameron Armenta sent at 5/1/2020  9:40 AM CDT -----  Contact: Kristi/Dr. Nuzhat Kline's Office  Kristi called in and stated Dr. Kline would like patient to see a vascular surgeon for right leg swelling.  Kristi stated patient needed to be seen prior to 5/15/2020 if at all possible.      Kristi's call back number is [phone]

## 2020-05-01 NOTE — TELEPHONE ENCOUNTER
----- Message from Niki Quevedo LPN sent at 5/1/2020 11:23 AM CDT -----  Unfortunately, none of the CV surgeons on the [facility] treat edema or venous insufficiency at the present time. We are referring pts to Vascular Medicine at [facility] [phone].

## 2020-05-04 ENCOUNTER — PATIENT MESSAGE (OUTPATIENT)
Dept: FAMILY MEDICINE | Facility: CLINIC | Age: 75
End: 2020-05-04

## 2020-05-04 ENCOUNTER — TELEPHONE (OUTPATIENT)
Dept: FAMILY MEDICINE | Facility: CLINIC | Age: 75
End: 2020-05-04

## 2020-05-04 NOTE — TELEPHONE ENCOUNTER
Left message, pt needs to schedule ultrasound of lower extremities. Please schedule pt with vascular for unilateral, peripheral edema, neg dvt us. A venous insufficiency study is ordered if needed prior to the appt. Daughter would prefer vv consult if before may 15. I will continue to call for pt.

## 2020-05-18 RX ORDER — ALLOPURINOL 100 MG/1
TABLET ORAL
Qty: 90 TABLET | Refills: 1 | Status: SHIPPED | OUTPATIENT
Start: 2020-05-18 | End: 2021-03-15

## 2020-05-30 ENCOUNTER — HOSPITAL ENCOUNTER (OUTPATIENT)
Dept: RADIOLOGY | Facility: HOSPITAL | Age: 75
Discharge: HOME | End: 2020-05-30
Attending: FAMILY MEDICINE
Payer: MEDICARE

## 2020-05-30 DIAGNOSIS — Z12.31 SCREENING MAMMOGRAM FOR HIGH-RISK PATIENT: ICD-10-CM

## 2020-05-30 PROCEDURE — 77063 MAMMO DIGITAL SCREENING BILAT WITH TOMOSYNTHESIS_CAD: ICD-10-PCS | Mod: 26,,, | Performed by: RADIOLOGY

## 2020-05-30 PROCEDURE — 77067 SCR MAMMO BI INCL CAD: CPT | Mod: TC,PO

## 2020-05-30 PROCEDURE — 77067 SCR MAMMO BI INCL CAD: CPT | Mod: 26,,, | Performed by: RADIOLOGY

## 2020-05-30 PROCEDURE — 77067 MAMMO DIGITAL SCREENING BILAT WITH TOMOSYNTHESIS_CAD: ICD-10-PCS | Mod: 26,,, | Performed by: RADIOLOGY

## 2020-05-30 PROCEDURE — 77063 BREAST TOMOSYNTHESIS BI: CPT | Mod: 26,,, | Performed by: RADIOLOGY

## 2020-06-03 ENCOUNTER — PATIENT OUTREACH (OUTPATIENT)
Dept: ADMINISTRATIVE | Facility: HOSPITAL | Age: 75
End: 2020-06-03

## 2020-06-03 NOTE — PROGRESS NOTES
Chart review completed 06/03/2020  Care Everywhere updates requested and reviewed.  Immunizations reconciled. Media reviewed.

## 2020-06-08 RX ORDER — GABAPENTIN 600 MG/1
TABLET ORAL
Qty: 180 TABLET | Refills: 1 | Status: SHIPPED | OUTPATIENT
Start: 2020-06-08 | End: 2020-12-14

## 2020-07-14 ENCOUNTER — TELEPHONE (OUTPATIENT)
Dept: FAMILY MEDICINE | Facility: CLINIC | Age: 75
End: 2020-07-14

## 2020-07-14 NOTE — TELEPHONE ENCOUNTER
----- Message from Brad Salcido sent at 7/14/2020  4:57 PM CDT -----  Regarding: Advice  Contact: Daughter, Gabrielle/ [phone]  Gabrielle called in to inform you that she has tested positive for COVID-19 and being that she is the patient main caregiver she wants to know if the patient should be tested. Please call.

## 2020-07-20 ENCOUNTER — LAB VISIT (OUTPATIENT)
Dept: FAMILY MEDICINE | Facility: CLINIC | Age: 75
End: 2020-07-20
Payer: MEDICARE

## 2020-07-20 ENCOUNTER — PATIENT MESSAGE (OUTPATIENT)
Dept: FAMILY MEDICINE | Facility: CLINIC | Age: 75
End: 2020-07-20

## 2020-07-20 DIAGNOSIS — R50.9 FEVER: ICD-10-CM

## 2020-07-20 DIAGNOSIS — R11.0 NAUSEA: ICD-10-CM

## 2020-07-20 DIAGNOSIS — R05.9 COUGH: ICD-10-CM

## 2020-07-20 PROCEDURE — U0003 INFECTIOUS AGENT DETECTION BY NUCLEIC ACID (DNA OR RNA); SEVERE ACUTE RESPIRATORY SYNDROME CORONAVIRUS 2 (SARS-COV-2) (CORONAVIRUS DISEASE [COVID-19]), AMPLIFIED PROBE TECHNIQUE, MAKING USE OF HIGH THROUGHPUT TECHNOLOGIES AS DESCRIBED BY CMS-2020-01-R: HCPCS

## 2020-07-21 ENCOUNTER — NURSE TRIAGE (OUTPATIENT)
Dept: ADMINISTRATIVE | Facility: CLINIC | Age: 75
End: 2020-07-21

## 2020-07-21 LAB — SARS-COV-2 RNA RESP QL NAA+PROBE: DETECTED

## 2020-07-21 NOTE — TELEPHONE ENCOUNTER
Covid Symptom Tracker     Talked to Daughter in reference to response to the tracker.   O2 now on 3L 24/7.  Patient is sob on exertion,  Temp 101  2 dys ago,  Takes tylenol.  It reduces temp.  Cough intermittent, can be productive clear cloudy.  Gave her informaiton on her ret to work [employer].  [facility] RN  [phone]      Reason for Disposition   [1] COVID-19 diagnosed by positive lab test AND [2] mild symptoms (e.g., cough, fever, others) AND [3] no complications or SOB    Additional Information   Negative: SEVERE difficulty breathing (e.g., struggling for each breath, speaks in single words)   Negative: Difficult to awaken or acting confused (e.g., disoriented, slurred speech)   Negative: Bluish (or gray) lips or face now   Negative: Shock suspected (e.g., cold/pale/clammy skin, too weak to stand, low BP, rapid pulse)   Negative: Sounds like a life-threatening emergency to the triager   Negative: [1] COVID-19 exposure AND [2] NO symptoms   Negative: COVID-19 and Breastfeeding, questions about   Negative: [1] Adult with possible COVID-19 symptoms AND [2] triager concerned about severity of symptoms or other causes   Negative: SEVERE or constant chest pain or pressure (Exception: mild central chest pain, present only when coughing)   Negative: MODERATE difficulty breathing (e.g., speaks in phrases, SOB even at rest, pulse 100-120)   Negative: MILD difficulty breathing (e.g., minimal/no SOB at rest, SOB with walking, pulse <100)   Negative: Chest pain   Negative: Patient sounds very sick or weak to the triager   Negative: Fever > 103 F (39.4 C)   Negative: [1] Fever > 101 F (38.3 C) AND [2] age > 60   Negative: [1] Fever > 100.0 F (37.8 C) AND [2] bedridden (e.g., nursing home patient, CVA, chronic illness, recovering from surgery)   Negative: HIGH RISK patient (e.g., age > 64 years, diabetes, heart or lung disease, weak immune system)   Negative: [1] COVID-19 infection suspected by caller or  triager AND [2] mild symptoms (cough, fever, or others) AND [3] no complications or SOB   Negative: Fever present > 3 days (72 hours)   Negative: [1] Fever returns after gone for over 24 hours AND [2] symptoms worse or not improved   Negative: [1] Continuous (nonstop) coughing interferes with work or school AND [2] no improvement using cough treatment per protocol   Negative: Cough present > 3 weeks    Protocols used: CORONAVIRUS (COVID-19) DIAGNOSED OR [id number]

## 2020-07-22 ENCOUNTER — TELEPHONE (OUTPATIENT)
Dept: FAMILY MEDICINE | Facility: CLINIC | Age: 75
End: 2020-07-22

## 2020-07-22 RX ORDER — PREDNISONE 20 MG/1
TABLET ORAL
Qty: 7 TABLET | Refills: 0 | Status: SHIPPED | OUTPATIENT
Start: 2020-07-22 | End: 2020-07-22

## 2020-07-22 RX ORDER — PREDNISONE 20 MG/1
TABLET ORAL
Qty: 17 TABLET | Refills: 0 | Status: SHIPPED | OUTPATIENT
Start: 2020-07-22 | End: 2020-08-03

## 2020-07-22 NOTE — TELEPHONE ENCOUNTER
----- Message from Felipa Campos sent at 7/22/2020  8:47 AM CDT -----  Regarding: copy of cov-id test results  Contact: Gabrielle vaz  Type: Needs Medical Advice  Who Called:  Gabrielle vaz  Best Call Back Number: [phone]  Additional Information: Pls call Gabrielle regarding obtaining a copy of pt's cov-id test results.

## 2020-07-22 NOTE — TELEPHONE ENCOUNTER
Please notify daughter, Gabrielle, that patient's covid test was positive.   Recommend home under quarantine x 14 days.   Continue supplemental O2 and regular medications.  Start prednisone, 2 tablets daily x 5 days, then 1 tablet daily x 1 week.  Ensure pulmicort nebs BID.  To ER if worsening.

## 2020-07-23 ENCOUNTER — NURSE TRIAGE (OUTPATIENT)
Dept: ADMINISTRATIVE | Facility: CLINIC | Age: 75
End: 2020-07-23

## 2020-07-23 NOTE — TELEPHONE ENCOUNTER
Pt's daughter called in response to covid symptom tracking text. Pt covid positive as of 7/20/20,Spoke with daughter; Pt has history of COPD. Pt states no unusual SOB at this time but concerned over last few days. Pt has oxygen and pulse oximeter that pt's daughter closely monitors. States temp of 102 during the night relieved with Tylenol. States incontinent of urine last pm without realizing. States resting comfortably at this time. Family members covid positive.  Pt's daughter closely monitoring pt. Explained care advice and verbalized understanding. Told to seek ER care if S/S of dehydration, SOB or unusual behavior. Will route message to PCP and  pulmonogist.    Reason for Disposition   HIGH RISK patient (e.g., age > 64 years, diabetes, heart or lung disease, weak immune system)    Additional Information   Negative: [1] COVID-19 exposure AND [2] NO symptoms   Negative: COVID-19 and Breastfeeding, questions about   Negative: [1] Adult with possible COVID-19 symptoms AND [2] triager concerned about severity of symptoms or other causes   Negative: SEVERE or constant chest pain or pressure (Exception: mild central chest pain, present only when coughing)   Negative: MODERATE difficulty breathing (e.g., speaks in phrases, SOB even at rest, pulse 100-120)   Negative: MILD difficulty breathing (e.g., minimal/no SOB at rest, SOB with walking, pulse <100)   Negative: Chest pain   Negative: Patient sounds very sick or weak to the triager   Negative: Fever > 103 F (39.4 C)   Negative: [1] Fever > 101 F (38.3 C) AND [2] age > 60   Negative: [1] Fever > 100.0 F (37.8 C) AND [2] bedridden (e.g., nursing home patient, CVA, chronic illness, recovering from surgery)    Protocols used: CORONAVIRUS (COVID-19) DIAGNOSED OR [id number]

## 2020-07-25 ENCOUNTER — NURSE TRIAGE (OUTPATIENT)
Dept: ADMINISTRATIVE | Facility: CLINIC | Age: 75
End: 2020-07-25

## 2020-07-25 NOTE — TELEPHONE ENCOUNTER
Called [facility] operator- Doris. States Dr. Deidra Alarcon is on call for Dr. Kline. Connected by  to cell phone left message as follows.    I am registered nurse from [facility] calling you because a patient of Dr. Roca called in today to our line for a covid escalation. Pt also has history of severe COPD. Daughter is concerned pt has had fever x 5 days. Temp today is 100.8. Advised daughter she should consider taking mother to ED for eval since it is the weekend and her dispo indicted she would need to be seen within 24 hours. Daughter stated to me that pt's doctors would not want her doing that and that pt daughter wishes to speak with provider directly. Pt name is Luda Peters MRN [id number]. Sonya call daughter directly at [phone], repeat [phone]. Thank you.        Called Gabrielle, daughter back- advised her Dr. Deidra Alarcon was on call for Dr. Roca and that I left a voice mail on the provider's cell asking her to contact you.    Advised daughter to call back with any concerns, or worsening condition. Daughter verbalized understanding.

## 2020-07-25 NOTE — TELEPHONE ENCOUNTER
Pt contacted through Covid Symptom tracking for an escalation of symptoms. Daughter states pt is asleep and resting comfortably at present. Daughter she called in today with concern that pt has had fever x 5 days. States Temp earlier was 100.8. States she was just calling in to let us know. Daughter provided answers to triage questions. Daughter reports pt has baseline SOB and GUERRERO due to severe COPD. States pt on home O2, States pt's respiratory status is at baseline and reiterated she is mostly concerned with pt fever. States pts pulse ox and HR are at baseline level for pt States pt has not had any blue or gray color to lips or face.     Dispo Call PCP w/in 24 hours. Advised daughter triager would reach out to provider for directives. Verbalized understanding.     Called answering service through [facility] - states no one is on call for this service until 5p.  attempted to contact Dr. Alarcon who will be on call starting at 5p but was unsuccessful.  unable to take message for Provider. Advised operater triager would call back after 5p.    Called daughter back to let her know it will be after 5 pm before a provider is on call for her mother's PCP.     Reason for Disposition   Fever present > 3 days (72 hours)    Additional Information   Negative: SEVERE difficulty breathing (e.g., struggling for each breath, speaks in single words)   Negative: Difficult to awaken or acting confused (e.g., disoriented, slurred speech)   Negative: Bluish (or gray) lips or face now   Negative: Shock suspected (e.g., cold/pale/clammy skin, too weak to stand, low BP, rapid pulse)   Negative: Sounds like a life-threatening emergency to the triager   Negative: SEVERE or constant chest pain or pressure (Exception: mild central chest pain, present only when coughing)   Negative: MODERATE difficulty breathing (e.g., speaks in phrases, SOB even at rest, pulse 100-120)   Negative: Patient sounds very sick or weak  to the triager   Negative: MILD difficulty breathing (e.g., minimal/no SOB at rest, SOB with walking, pulse <100)     States pt has baseline SOB due to COPD and that pt is at her baseline and she monitors pt pulse ox which has remained at 92% which is baseline for patient. No increase in pt home O2.    AT time of call pt is asleep and appears to be resting comfortably per daughter.   Negative: Chest pain or pressure   Negative: Fever > 103 F (39.4 C)   Negative: [1] Fever > 101 F (38.3 C) AND [2] age > 60   Negative: [1] Fever > 100.0 F (37.8 C) AND [2] bedridden (e.g., nursing home patient, CVA, chronic illness, recovering from surgery)   Negative: HIGH RISK patient (e.g., age > 64 years, diabetes, heart or lung disease, weak immune system)    Protocols used: CORONAVIRUS (COVID-19) DIAGNOSED OR [id number]

## 2020-08-03 ENCOUNTER — OFFICE VISIT (OUTPATIENT)
Dept: FAMILY MEDICINE | Facility: CLINIC | Age: 75
End: 2020-08-03
Payer: MEDICARE

## 2020-08-03 VITALS
WEIGHT: 175.69 LBS | TEMPERATURE: 98 F | RESPIRATION RATE: 16 BRPM | HEART RATE: 72 BPM | OXYGEN SATURATION: 91 % | DIASTOLIC BLOOD PRESSURE: 68 MMHG | BODY MASS INDEX: 32.33 KG/M2 | HEIGHT: 62 IN | SYSTOLIC BLOOD PRESSURE: 112 MMHG

## 2020-08-03 DIAGNOSIS — J96.10 CHRONIC RESPIRATORY FAILURE, UNSPECIFIED WHETHER WITH HYPOXIA OR HYPERCAPNIA: Primary | ICD-10-CM

## 2020-08-03 DIAGNOSIS — I51.89 DIASTOLIC DYSFUNCTION: ICD-10-CM

## 2020-08-03 DIAGNOSIS — Z79.899 HIGH RISK MEDICATIONS (NOT ANTICOAGULANTS) LONG-TERM USE: ICD-10-CM

## 2020-08-03 DIAGNOSIS — M25.561 ACUTE PAIN OF RIGHT KNEE: ICD-10-CM

## 2020-08-03 PROCEDURE — 99214 OFFICE O/P EST MOD 30 MIN: CPT | Mod: S$GLB,,, | Performed by: FAMILY MEDICINE

## 2020-08-03 PROCEDURE — 99499 RISK ADDL DX/OHS AUDIT: ICD-10-PCS | Mod: S$GLB,,, | Performed by: FAMILY MEDICINE

## 2020-08-03 PROCEDURE — 1101F PT FALLS ASSESS-DOCD LE1/YR: CPT | Mod: CPTII,S$GLB,, | Performed by: FAMILY MEDICINE

## 2020-08-03 PROCEDURE — 1101F PR PT FALLS ASSESS DOC 0-1 FALLS W/OUT INJ PAST YR: ICD-10-PCS | Mod: CPTII,S$GLB,, | Performed by: FAMILY MEDICINE

## 2020-08-03 PROCEDURE — 99999 PR PBB SHADOW E&M-EST. PATIENT-LVL V: ICD-10-PCS | Mod: PBBFAC,,, | Performed by: FAMILY MEDICINE

## 2020-08-03 PROCEDURE — 1126F AMNT PAIN NOTED NONE PRSNT: CPT | Mod: S$GLB,,, | Performed by: FAMILY MEDICINE

## 2020-08-03 PROCEDURE — 1159F PR MEDICATION LIST DOCUMENTED IN MEDICAL RECORD: ICD-10-PCS | Mod: S$GLB,,, | Performed by: FAMILY MEDICINE

## 2020-08-03 PROCEDURE — 99999 PR PBB SHADOW E&M-EST. PATIENT-LVL V: CPT | Mod: PBBFAC,,, | Performed by: FAMILY MEDICINE

## 2020-08-03 PROCEDURE — 99499 UNLISTED E&M SERVICE: CPT | Mod: S$GLB,,, | Performed by: FAMILY MEDICINE

## 2020-08-03 PROCEDURE — 1159F MED LIST DOCD IN RCRD: CPT | Mod: S$GLB,,, | Performed by: FAMILY MEDICINE

## 2020-08-03 PROCEDURE — 1126F PR PAIN SEVERITY QUANTIFIED, NO PAIN PRESENT: ICD-10-PCS | Mod: S$GLB,,, | Performed by: FAMILY MEDICINE

## 2020-08-03 PROCEDURE — 99214 PR OFFICE/OUTPT VISIT, EST, LEVL IV, 30-39 MIN: ICD-10-PCS | Mod: S$GLB,,, | Performed by: FAMILY MEDICINE

## 2020-08-03 RX ORDER — IBUPROFEN 100 MG/5ML
1000 SUSPENSION, ORAL (FINAL DOSE FORM) ORAL DAILY PRN
COMMUNITY

## 2020-08-03 RX ORDER — DICLOFENAC SODIUM 10 MG/G
2 GEL TOPICAL 2 TIMES DAILY
Qty: 100 G | Refills: 1 | Status: SHIPPED | OUTPATIENT
Start: 2020-08-03 | End: 2021-06-25

## 2020-08-03 NOTE — PROGRESS NOTES
Subjective:       Patient ID: Luda Peters is a 75 y.o. female.    Chief Complaint: Hospital Follow Up      Luda Peters is in the office for hosp f/u. In clinic, accompanied by her daughter Gabrielle with whom she lives and who is primary caregiver.     HPI  Since LOV, had ER eval re: covid 19 dx, assd cough and dyspnea. pOx was notably lower than normal for her. They note that she responded quickly to the abx and steroids and has been doing well since. Still maintaining slightly more frequent use of her supplemental O2, but decreasing.  Otherwise feeling well today - no new complaints.   Past Medical History:   Diagnosis Date    Anticoagulant long-term use     Anxiety 9/26/2016    Back pain     Closed fracture of second lumbar vertebra with routine healing 12/7/2015    COPD (chronic obstructive pulmonary disease) 9/26/2016    Emphysema of lung     Encounter for blood transfusion     Gastric ulcer     Hypertension          Current Outpatient Medications:     acetaminophen (TYLENOL) 500 MG tablet, Take 1,000 mg by mouth every 6 (six) hours as needed for Pain. , Disp: , Rfl:     acetylcysteine 100 mg/ml, 10%, (MUCOMYST) 100 mg/mL (10 %) nebulizer solution, Take 1 mL by nebulization every 6 (six) hours as needed., Disp: , Rfl:     albuterol (VENTOLIN HFA) 90 mcg/actuation inhaler, Inhale 2 puffs into the lungs every 6 (six) hours as needed for Wheezing. Rescue, Disp: 8 g, Rfl: 1    allopurinoL (ZYLOPRIM) 100 MG tablet, TAKE 1 TABLET BY MOUTH ONCE DAILY, Disp: 90 tablet, Rfl: 1    ARIPiprazole (ABILIFY) 2 MG Tab, TAKE 1 TABLET BY MOUTH ONCE DAILY, Disp: 90 tablet, Rfl: 1    ascorbic acid, vitamin C, (VITAMIN C) 1000 MG tablet, Take 1,000 mg by mouth once daily., Disp: , Rfl:     budesonide (PULMICORT) 0.5 mg/2 mL nebulizer solution, USE 1 VIAL VIA NEBULIZER EVERY 12 HOURS (Patient taking differently: Take 0.5 mg by nebulization every 12 (twelve) hours as needed. ), Disp: 120 mL, Rfl: 1     clopidogreL (PLAVIX) 75 mg tablet, TAKE 1 TABLET BY MOUTH ONCE DAILY, Disp: 90 tablet, Rfl: 1    diazePAM (VALIUM) 10 MG Tab, Take 2 tablets (20 mg total) by mouth nightly as needed., Disp: 30 tablet, Rfl: 0    diclofenac sodium (VOLTAREN) 1 % Gel, Apply 2 g topically 2 (two) times daily., Disp: 100 g, Rfl: 1    docusate sodium (COLACE) 50 MG capsule, Take 100 mg by mouth every evening. , Disp: , Rfl:     escitalopram oxalate (LEXAPRO) 20 MG tablet, TAKE 1 TABLET BY MOUTH EVERY EVENING, Disp: 90 tablet, Rfl: 1    ferrous gluconate (FERGON) 324 MG tablet, Take 1 tablet (324 mg total) by mouth daily with breakfast., Disp: 90 tablet, Rfl: 1    furosemide (LASIX) 40 MG tablet, TAKE 1 TABLET BY MOUTH TWICE DAILY, Disp: 180 tablet, Rfl: 1    gabapentin (NEURONTIN) 600 MG tablet, TAKE 1 TABLET BY MOUTH TWICE DAILY, Disp: 180 tablet, Rfl: 1    metoprolol tartrate (LOPRESSOR) 25 MG tablet, TAKE 1 TABLET BY MOUTH TWICE DAILY, Disp: 180 tablet, Rfl: 1    nortriptyline (PAMELOR) 25 MG capsule, TAKE ONE CAPSULE BY MOUTH EVERY EVENING, Disp: 90 capsule, Rfl: 1    pantoprazole (PROTONIX) 40 MG tablet, TAKE 1 TABLET BY MOUTH TWICE DAILY, Disp: 180 tablet, Rfl: 1    spironolactone (ALDACTONE) 25 MG tablet, TAKE 1 TABLET BY MOUTH ONCE DAILY., Disp: 90 tablet, Rfl: 1    traMADol (ULTRAM) 50 mg tablet, Take 50 mg by mouth every 6 (six) hours as needed for Pain., Disp: , Rfl:     TRELEGY ELLIPTA 100-62.5-25 mcg DsDv, INHALE ONE PUFF BY MOUTH ONCE DAILY, Disp: 1 each, Rfl: 11    ZINC ACETATE ORAL, Take 1 tablet by mouth once daily., Disp: , Rfl:     metroNIDAZOLE (FLAGYL) 500 MG tablet, Take 1 tablet (500 mg total) by mouth every 8 (eight) hours., Disp: 21 tablet, Rfl: 0    naproxen sodium (ANAPROX) 220 MG tablet, Take 220 mg by mouth 2 (two) times daily with meals., Disp: , Rfl:     spironolactone (ALDACTONE) 25 MG tablet, TAKE 1 TABLET BY MOUTH ONCE DAILY., Disp: 90 tablet, Rfl: 1    The 10-year ASCVD risk score (Hoa  NICHOLAS Mendoza et al., 2013) is: 12.5%    Values used to calculate the score:      Age: 75 years      Sex: Female      Is Non- : No      Diabetic: No      Tobacco smoker: No      Systolic Blood Pressure: 112 mmHg      Is BP treated: No      HDL Cholesterol: 56 mg/dL      Total Cholesterol: 189 mg/dL     Lab Results   Component Value Date    HGBA1C 5.5 07/20/2019    HGBA1C 5.7 (H) 03/21/2018    HGBA1C 5.9 02/16/2017     Lab Results   Component Value Date    LDLCALC 106.4 07/20/2019    CREATININE 0.91 07/27/2020   Labs 2020 rev.    Review of Systems   Unable to perform ROS: Psychiatric disorder   Constitutional: Negative for fatigue, fever and unexpected weight change.   HENT: Negative for rhinorrhea and trouble swallowing.    Eyes: Negative for discharge and visual disturbance.   Respiratory: Positive for shortness of breath (stable, not currently on O2 in clinic). Negative for cough (frequent, productive of white sputum) and wheezing.    Cardiovascular: Negative for chest pain, palpitations and leg swelling.   Gastrointestinal: Negative for abdominal pain, blood in stool, constipation (denies) and diarrhea.   Genitourinary: Negative for difficulty urinating and hematuria.   Musculoskeletal: Negative for arthralgias (no complaints today) and joint swelling.        R>L lower leg ache   Skin: Negative for color change and rash.   Neurological: Positive for weakness (ambulates with a walker). Negative for headaches.   Psychiatric/Behavioral: Positive for agitation. Negative for confusion, dysphoric mood and sleep disturbance. The patient is not nervous/anxious (looking forward to seeing her grandson after time in quarantine).            Objective:      Physical Exam  Vitals signs and nursing note reviewed.   Constitutional:       General: She is not in acute distress.     Appearance: She is well-developed.   HENT:      Head: Normocephalic and atraumatic.   Eyes:      General: No scleral icterus.         Right eye: No discharge.         Left eye: No discharge.      Conjunctiva/sclera: Conjunctivae normal.   Neck:      Musculoskeletal: Normal range of motion and neck supple.   Cardiovascular:      Rate and Rhythm: Normal rate.   Pulmonary:      Effort: Pulmonary effort is normal. No respiratory distress. Accessory muscle usage: for deep insp.      Breath sounds: Decreased breath sounds present. No wheezing.   Abdominal:      General: There is no distension.      Palpations: Abdomen is soft.   Musculoskeletal: Normal range of motion.      Comments: Pt examined in chair   Lymphadenopathy:      Cervical: No cervical adenopathy.   Skin:     General: Skin is warm and dry.      Findings: No rash.   Neurological:      Mental Status: She is alert and oriented to person, place, and time.      Cranial Nerves: No cranial nerve deficit.   Psychiatric:         Mood and Affect: Mood is not anxious. Affect is not labile.         Speech: Speech is tangential (mild).         Behavior: Behavior is not agitated or combative.      Comments: Very pleasant today - looking forward to seeing her grandbaby              Screening recommendations appropriate to age and health status were reviewed.    Assessment & Plan:    Chronic respiratory failure, unspecified whether with hypoxia or hypercapnia  -     CBC Without Differential; Future; Expected date: 08/04/2020  -     Comprehensive metabolic panel; Future; Expected date: 08/04/2020  -     TSH; Future; Expected date: 08/04/2020  -     Hemoglobin A1C; Future; Expected date: 08/04/2020  -     Lipid Panel; Future; Expected date: 08/04/2020    Acute pain of right knee  -     diclofenac sodium (VOLTAREN) 1 % Gel; Apply 2 g topically 2 (two) times daily.  Dispense: 100 g; Refill: 1    High risk medications (not anticoagulants) long-term use  -     CBC Without Differential; Future; Expected date: 08/04/2020  -     Comprehensive metabolic panel; Future; Expected date: 08/04/2020  -     TSH; Future;  Expected date: 08/04/2020  -     Hemoglobin A1C; Future; Expected date: 08/04/2020  -     Lipid Panel; Future; Expected date: 08/04/2020    Diastolic dysfunction  -     CBC Without Differential; Future; Expected date: 08/04/2020  -     Comprehensive metabolic panel; Future; Expected date: 08/04/2020  -     TSH; Future; Expected date: 08/04/2020  -     Hemoglobin A1C; Future; Expected date: 08/04/2020  -     Lipid Panel; Future; Expected date: 08/04/2020    doing well in clinic today. Needs to update lab  Elevated lfts on ER draw. Will schedule during her upcoming pulm appt this week. Will also need to update pvx.

## 2020-08-15 ENCOUNTER — HOSPITAL ENCOUNTER (OUTPATIENT)
Dept: RADIOLOGY | Facility: HOSPITAL | Age: 75
Discharge: HOME | End: 2020-08-15
Attending: NURSE PRACTITIONER
Payer: MEDICARE

## 2020-08-15 DIAGNOSIS — U07.1 PNEUMONIA DUE TO COVID-19 VIRUS: ICD-10-CM

## 2020-08-15 DIAGNOSIS — J12.82 PNEUMONIA DUE TO COVID-19 VIRUS: ICD-10-CM

## 2020-08-15 PROCEDURE — 71046 X-RAY EXAM CHEST 2 VIEWS: CPT | Mod: TC,FY,PO

## 2020-08-15 PROCEDURE — 71046 XR CHEST PA AND LATERAL: ICD-10-PCS | Mod: 26,,, | Performed by: RADIOLOGY

## 2020-08-15 PROCEDURE — 71046 X-RAY EXAM CHEST 2 VIEWS: CPT | Mod: 26,,, | Performed by: RADIOLOGY

## 2020-08-28 ENCOUNTER — LAB VISIT (OUTPATIENT)
Dept: LAB | Facility: HOSPITAL | Age: 75
End: 2020-08-28
Attending: NURSE PRACTITIONER
Payer: MEDICARE

## 2020-08-28 DIAGNOSIS — J96.01 ACUTE HYPOXEMIC RESPIRATORY FAILURE: ICD-10-CM

## 2020-08-28 DIAGNOSIS — J12.82 PNEUMONIA DUE TO COVID-19 VIRUS: ICD-10-CM

## 2020-08-28 DIAGNOSIS — U07.1 PNEUMONIA DUE TO COVID-19 VIRUS: ICD-10-CM

## 2020-08-28 PROCEDURE — 85379 FIBRIN DEGRADATION QUANT: CPT

## 2020-08-28 PROCEDURE — 36415 COLL VENOUS BLD VENIPUNCTURE: CPT | Mod: PN

## 2020-08-31 LAB — D DIMER PPP IA.FEU-MCNC: 0.58 MG/L FEU

## 2020-10-19 ENCOUNTER — PES CALL (OUTPATIENT)
Dept: ADMINISTRATIVE | Facility: CLINIC | Age: 75
End: 2020-10-19

## 2020-11-09 DIAGNOSIS — I71.40 ABDOMINAL AORTIC ANEURYSM WITHOUT RUPTURE: Primary | ICD-10-CM

## 2021-02-11 ENCOUNTER — TELEPHONE (OUTPATIENT)
Dept: FAMILY MEDICINE | Facility: CLINIC | Age: 76
End: 2021-02-11

## 2021-02-11 DIAGNOSIS — Z79.899 HIGH RISK MEDICATIONS (NOT ANTICOAGULANTS) LONG-TERM USE: ICD-10-CM

## 2021-02-11 DIAGNOSIS — R73.03 PREDIABETES: Primary | ICD-10-CM

## 2021-02-19 ENCOUNTER — LAB VISIT (OUTPATIENT)
Dept: LAB | Facility: HOSPITAL | Age: 76
End: 2021-02-19
Attending: FAMILY MEDICINE
Payer: MEDICARE

## 2021-02-19 DIAGNOSIS — R73.03 PREDIABETES: ICD-10-CM

## 2021-02-19 DIAGNOSIS — Z79.899 HIGH RISK MEDICATIONS (NOT ANTICOAGULANTS) LONG-TERM USE: ICD-10-CM

## 2021-02-19 LAB
ALBUMIN SERPL BCP-MCNC: 3.7 G/DL (ref 3.5–5.2)
ALP SERPL-CCNC: 96 U/L (ref 55–135)
ALT SERPL W/O P-5'-P-CCNC: 8 U/L (ref 10–44)
ANION GAP SERPL CALC-SCNC: 13 MMOL/L (ref 8–16)
AST SERPL-CCNC: 18 U/L (ref 10–40)
BILIRUB SERPL-MCNC: 0.5 MG/DL (ref 0.1–1)
BUN SERPL-MCNC: 37 MG/DL (ref 8–23)
CALCIUM SERPL-MCNC: 9.6 MG/DL (ref 8.7–10.5)
CHLORIDE SERPL-SCNC: 91 MMOL/L (ref 95–110)
CO2 SERPL-SCNC: 37 MMOL/L (ref 23–29)
CREAT SERPL-MCNC: 1 MG/DL (ref 0.5–1.4)
ERYTHROCYTE [DISTWIDTH] IN BLOOD BY AUTOMATED COUNT: 16 % (ref 11.5–14.5)
EST. GFR  (AFRICAN AMERICAN): >60 ML/MIN/1.73 M^2
EST. GFR  (NON AFRICAN AMERICAN): 55.2 ML/MIN/1.73 M^2
GLUCOSE SERPL-MCNC: 82 MG/DL (ref 70–110)
HCT VFR BLD AUTO: 49.5 % (ref 37–48.5)
HGB BLD-MCNC: 14.1 G/DL (ref 12–16)
MCH RBC QN AUTO: 28.8 PG (ref 27–31)
MCHC RBC AUTO-ENTMCNC: 28.5 G/DL (ref 32–36)
MCV RBC AUTO: 101 FL (ref 82–98)
PLATELET # BLD AUTO: 244 K/UL (ref 150–350)
PMV BLD AUTO: 12.1 FL (ref 9.2–12.9)
POTASSIUM SERPL-SCNC: 3.9 MMOL/L (ref 3.5–5.1)
PROT SERPL-MCNC: 8 G/DL (ref 6–8.4)
RBC # BLD AUTO: 4.89 M/UL (ref 4–5.4)
SODIUM SERPL-SCNC: 141 MMOL/L (ref 136–145)
TSH SERPL DL<=0.005 MIU/L-ACNC: 3.7 UIU/ML (ref 0.4–4)
WBC # BLD AUTO: 9.19 K/UL (ref 3.9–12.7)

## 2021-02-19 PROCEDURE — 85027 COMPLETE CBC AUTOMATED: CPT

## 2021-02-19 PROCEDURE — 83036 HEMOGLOBIN GLYCOSYLATED A1C: CPT

## 2021-02-19 PROCEDURE — 36415 COLL VENOUS BLD VENIPUNCTURE: CPT | Mod: PN

## 2021-02-19 PROCEDURE — 80053 COMPREHEN METABOLIC PANEL: CPT

## 2021-02-19 PROCEDURE — 84443 ASSAY THYROID STIM HORMONE: CPT

## 2021-02-20 LAB
ESTIMATED AVG GLUCOSE: 114 MG/DL (ref 68–131)
HBA1C MFR BLD: 5.6 % (ref 4–5.6)

## 2021-02-22 ENCOUNTER — TELEPHONE (OUTPATIENT)
Dept: FAMILY MEDICINE | Facility: CLINIC | Age: 76
End: 2021-02-22

## 2021-03-04 ENCOUNTER — PATIENT OUTREACH (OUTPATIENT)
Dept: ADMINISTRATIVE | Facility: HOSPITAL | Age: 76
End: 2021-03-04

## 2021-03-18 ENCOUNTER — HOSPITAL ENCOUNTER (OUTPATIENT)
Dept: RADIOLOGY | Facility: HOSPITAL | Age: 76
Discharge: HOME | End: 2021-03-18
Attending: FAMILY MEDICINE
Payer: MEDICARE

## 2021-03-18 ENCOUNTER — OFFICE VISIT (OUTPATIENT)
Dept: FAMILY MEDICINE | Facility: CLINIC | Age: 76
End: 2021-03-18
Payer: MEDICARE

## 2021-03-18 VITALS
SYSTOLIC BLOOD PRESSURE: 104 MMHG | WEIGHT: 178.88 LBS | DIASTOLIC BLOOD PRESSURE: 62 MMHG | HEART RATE: 68 BPM | TEMPERATURE: 98 F | BODY MASS INDEX: 32.92 KG/M2 | RESPIRATION RATE: 14 BRPM | HEIGHT: 62 IN

## 2021-03-18 DIAGNOSIS — I51.89 DIASTOLIC DYSFUNCTION: ICD-10-CM

## 2021-03-18 DIAGNOSIS — D50.0 IRON DEFICIENCY ANEMIA DUE TO CHRONIC BLOOD LOSS: ICD-10-CM

## 2021-03-18 DIAGNOSIS — Z79.899 HIGH RISK MEDICATIONS (NOT ANTICOAGULANTS) LONG-TERM USE: ICD-10-CM

## 2021-03-18 DIAGNOSIS — J96.10 CHRONIC RESPIRATORY FAILURE, UNSPECIFIED WHETHER WITH HYPOXIA OR HYPERCAPNIA: ICD-10-CM

## 2021-03-18 DIAGNOSIS — R73.03 PREDIABETES: ICD-10-CM

## 2021-03-18 DIAGNOSIS — I27.20 PULMONARY HYPERTENSION, UNSPECIFIED: ICD-10-CM

## 2021-03-18 DIAGNOSIS — J44.9 CHRONIC OBSTRUCTIVE PULMONARY DISEASE, UNSPECIFIED COPD TYPE: ICD-10-CM

## 2021-03-18 DIAGNOSIS — M54.31 SCIATICA OF RIGHT SIDE: ICD-10-CM

## 2021-03-18 DIAGNOSIS — M54.9 DORSALGIA, UNSPECIFIED: ICD-10-CM

## 2021-03-18 DIAGNOSIS — J44.9 CHRONIC OBSTRUCTIVE PULMONARY DISEASE, UNSPECIFIED COPD TYPE: Primary | ICD-10-CM

## 2021-03-18 DIAGNOSIS — E66.9 OBESITY, UNSPECIFIED CLASSIFICATION, UNSPECIFIED OBESITY TYPE, UNSPECIFIED WHETHER SERIOUS COMORBIDITY PRESENT: ICD-10-CM

## 2021-03-18 DIAGNOSIS — F03.90 DEMENTIA WITHOUT BEHAVIORAL DISTURBANCE, UNSPECIFIED DEMENTIA TYPE: ICD-10-CM

## 2021-03-18 DIAGNOSIS — Z86.79 H/O ATRIAL FIBRILLATION WITHOUT CURRENT MEDICATION: ICD-10-CM

## 2021-03-18 LAB
BILIRUB SERPL-MCNC: NEGATIVE MG/DL
BLOOD URINE, POC: NEGATIVE
CLARITY, POC UA: CLEAR
COLOR, POC UA: NORMAL
GLUCOSE UR QL STRIP: NORMAL
KETONES UR QL STRIP: NEGATIVE
LEUKOCYTE ESTERASE URINE, POC: NEGATIVE
NITRITE, POC UA: NEGATIVE
PH, POC UA: 1.01
PROTEIN, POC: NEGATIVE
SPECIFIC GRAVITY, POC UA: 1.01
UROBILINOGEN, POC UA: NORMAL

## 2021-03-18 PROCEDURE — 72100 X-RAY EXAM L-S SPINE 2/3 VWS: CPT | Mod: TC,PN

## 2021-03-18 PROCEDURE — 1101F PR PT FALLS ASSESS DOC 0-1 FALLS W/OUT INJ PAST YR: ICD-10-PCS | Mod: CPTII,S$GLB,, | Performed by: FAMILY MEDICINE

## 2021-03-18 PROCEDURE — G0009 PNEUMOCOCCAL POLYSACCHARIDE VACCINE 23-VALENT =>2YO SQ IM: ICD-10-PCS | Mod: S$GLB,,, | Performed by: FAMILY MEDICINE

## 2021-03-18 PROCEDURE — 99215 PR OFFICE/OUTPT VISIT, EST, LEVL V, 40-54 MIN: ICD-10-PCS | Mod: 25,S$GLB,, | Performed by: FAMILY MEDICINE

## 2021-03-18 PROCEDURE — 90732 PNEUMOCOCCAL POLYSACCHARIDE VACCINE 23-VALENT =>2YO SQ IM: ICD-10-PCS | Mod: S$GLB,,, | Performed by: FAMILY MEDICINE

## 2021-03-18 PROCEDURE — 1101F PT FALLS ASSESS-DOCD LE1/YR: CPT | Mod: CPTII,S$GLB,, | Performed by: FAMILY MEDICINE

## 2021-03-18 PROCEDURE — 1125F AMNT PAIN NOTED PAIN PRSNT: CPT | Mod: S$GLB,,, | Performed by: FAMILY MEDICINE

## 2021-03-18 PROCEDURE — 99999 PR PBB SHADOW E&M-EST. PATIENT-LVL V: CPT | Mod: PBBFAC,,, | Performed by: FAMILY MEDICINE

## 2021-03-18 PROCEDURE — 99499 UNLISTED E&M SERVICE: CPT | Mod: S$GLB,,, | Performed by: FAMILY MEDICINE

## 2021-03-18 PROCEDURE — 72100 XR LUMBAR SPINE AP AND LATERAL: ICD-10-PCS | Mod: 26,,, | Performed by: RADIOLOGY

## 2021-03-18 PROCEDURE — 3288F PR FALLS RISK ASSESSMENT DOCUMENTED: ICD-10-PCS | Mod: CPTII,S$GLB,, | Performed by: FAMILY MEDICINE

## 2021-03-18 PROCEDURE — 1159F PR MEDICATION LIST DOCUMENTED IN MEDICAL RECORD: ICD-10-PCS | Mod: S$GLB,,, | Performed by: FAMILY MEDICINE

## 2021-03-18 PROCEDURE — 99499 RISK ADDL DX/OHS AUDIT: ICD-10-PCS | Mod: S$GLB,,, | Performed by: FAMILY MEDICINE

## 2021-03-18 PROCEDURE — 71046 X-RAY EXAM CHEST 2 VIEWS: CPT | Mod: 26,,, | Performed by: RADIOLOGY

## 2021-03-18 PROCEDURE — 81002 URINALYSIS NONAUTO W/O SCOPE: CPT | Mod: S$GLB,,, | Performed by: FAMILY MEDICINE

## 2021-03-18 PROCEDURE — 1159F MED LIST DOCD IN RCRD: CPT | Mod: S$GLB,,, | Performed by: FAMILY MEDICINE

## 2021-03-18 PROCEDURE — 1125F PR PAIN SEVERITY QUANTIFIED, PAIN PRESENT: ICD-10-PCS | Mod: S$GLB,,, | Performed by: FAMILY MEDICINE

## 2021-03-18 PROCEDURE — 81003 URINALYSIS AUTO W/O SCOPE: CPT | Performed by: FAMILY MEDICINE

## 2021-03-18 PROCEDURE — 99999 PR PBB SHADOW E&M-EST. PATIENT-LVL V: ICD-10-PCS | Mod: PBBFAC,,, | Performed by: FAMILY MEDICINE

## 2021-03-18 PROCEDURE — 3288F FALL RISK ASSESSMENT DOCD: CPT | Mod: CPTII,S$GLB,, | Performed by: FAMILY MEDICINE

## 2021-03-18 PROCEDURE — 71046 XR CHEST PA AND LATERAL: ICD-10-PCS | Mod: 26,,, | Performed by: RADIOLOGY

## 2021-03-18 PROCEDURE — G0009 ADMIN PNEUMOCOCCAL VACCINE: HCPCS | Mod: S$GLB,,, | Performed by: FAMILY MEDICINE

## 2021-03-18 PROCEDURE — 81002 POCT URINE DIPSTICK WITHOUT MICROSCOPE: ICD-10-PCS | Mod: S$GLB,,, | Performed by: FAMILY MEDICINE

## 2021-03-18 PROCEDURE — 99215 OFFICE O/P EST HI 40 MIN: CPT | Mod: 25,S$GLB,, | Performed by: FAMILY MEDICINE

## 2021-03-18 PROCEDURE — 72100 X-RAY EXAM L-S SPINE 2/3 VWS: CPT | Mod: 26,,, | Performed by: RADIOLOGY

## 2021-03-18 PROCEDURE — 71046 X-RAY EXAM CHEST 2 VIEWS: CPT | Mod: TC,PN

## 2021-03-18 PROCEDURE — 90732 PPSV23 VACC 2 YRS+ SUBQ/IM: CPT | Mod: S$GLB,,, | Performed by: FAMILY MEDICINE

## 2021-03-18 RX ORDER — PREDNISONE 20 MG/1
20 TABLET ORAL DAILY
Qty: 3 TABLET | Refills: 0 | Status: SHIPPED | OUTPATIENT
Start: 2021-03-18 | End: 2021-11-26

## 2021-03-19 LAB
BILIRUB UR QL STRIP: NEGATIVE
CLARITY UR REFRACT.AUTO: CLEAR
COLOR UR AUTO: YELLOW
GLUCOSE UR QL STRIP: NEGATIVE
HGB UR QL STRIP: NEGATIVE
KETONES UR QL STRIP: NEGATIVE
LEUKOCYTE ESTERASE UR QL STRIP: NEGATIVE
NITRITE UR QL STRIP: NEGATIVE
PH UR STRIP: 6 [PH] (ref 5–8)
PROT UR QL STRIP: NEGATIVE
SP GR UR STRIP: 1.01 (ref 1–1.03)
URN SPEC COLLECT METH UR: NORMAL

## 2021-03-25 RX ORDER — GABAPENTIN 600 MG/1
600 TABLET ORAL 2 TIMES DAILY
Qty: 180 TABLET | Refills: 1 | Status: SHIPPED | OUTPATIENT
Start: 2021-03-25 | End: 2021-12-08

## 2021-03-30 DIAGNOSIS — Z12.11 COLON CANCER SCREENING: ICD-10-CM

## 2021-04-06 ENCOUNTER — PATIENT MESSAGE (OUTPATIENT)
Dept: ADMINISTRATIVE | Facility: HOSPITAL | Age: 76
End: 2021-04-06

## 2021-04-09 ENCOUNTER — TELEPHONE (OUTPATIENT)
Dept: FAMILY MEDICINE | Facility: CLINIC | Age: 76
End: 2021-04-09

## 2021-06-16 ENCOUNTER — TELEPHONE (OUTPATIENT)
Dept: FAMILY MEDICINE | Facility: CLINIC | Age: 76
End: 2021-06-16

## 2021-10-07 ENCOUNTER — TELEPHONE (OUTPATIENT)
Dept: FAMILY MEDICINE | Facility: CLINIC | Age: 76
End: 2021-10-07

## 2021-10-15 ENCOUNTER — LAB VISIT (OUTPATIENT)
Dept: LAB | Facility: HOSPITAL | Age: 76
End: 2021-10-15
Attending: FAMILY MEDICINE
Payer: MEDICARE

## 2021-10-15 DIAGNOSIS — R73.03 PREDIABETES: ICD-10-CM

## 2021-10-15 DIAGNOSIS — J96.10 CHRONIC RESPIRATORY FAILURE, UNSPECIFIED WHETHER WITH HYPOXIA OR HYPERCAPNIA: ICD-10-CM

## 2021-10-15 DIAGNOSIS — Z79.899 HIGH RISK MEDICATIONS (NOT ANTICOAGULANTS) LONG-TERM USE: ICD-10-CM

## 2021-10-15 DIAGNOSIS — D50.0 IRON DEFICIENCY ANEMIA DUE TO CHRONIC BLOOD LOSS: ICD-10-CM

## 2021-10-15 DIAGNOSIS — I51.89 DIASTOLIC DYSFUNCTION: ICD-10-CM

## 2021-10-15 LAB
ALBUMIN SERPL BCP-MCNC: 3.7 G/DL (ref 3.5–5.2)
ALP SERPL-CCNC: 90 U/L (ref 55–135)
ALT SERPL W/O P-5'-P-CCNC: 8 U/L (ref 10–44)
ANION GAP SERPL CALC-SCNC: 18 MMOL/L (ref 8–16)
AST SERPL-CCNC: 15 U/L (ref 10–40)
BILIRUB SERPL-MCNC: 0.7 MG/DL (ref 0.1–1)
BUN SERPL-MCNC: 44 MG/DL (ref 8–23)
CALCIUM SERPL-MCNC: 10.1 MG/DL (ref 8.7–10.5)
CHLORIDE SERPL-SCNC: 89 MMOL/L (ref 95–110)
CHOLEST SERPL-MCNC: 159 MG/DL (ref 120–199)
CHOLEST/HDLC SERPL: 3.4 {RATIO} (ref 2–5)
CO2 SERPL-SCNC: 32 MMOL/L (ref 23–29)
CREAT SERPL-MCNC: 1.2 MG/DL (ref 0.5–1.4)
ERYTHROCYTE [DISTWIDTH] IN BLOOD BY AUTOMATED COUNT: 15.8 % (ref 11.5–14.5)
EST. GFR  (AFRICAN AMERICAN): 50.7 ML/MIN/1.73 M^2
EST. GFR  (NON AFRICAN AMERICAN): 44 ML/MIN/1.73 M^2
ESTIMATED AVG GLUCOSE: 123 MG/DL (ref 68–131)
FERRITIN SERPL-MCNC: 10 NG/ML (ref 20–300)
FOLATE SERPL-MCNC: 8.9 NG/ML (ref 4–24)
GLUCOSE SERPL-MCNC: 93 MG/DL (ref 70–110)
HBA1C MFR BLD: 5.9 % (ref 4–5.6)
HCT VFR BLD AUTO: 50.1 % (ref 37–48.5)
HDLC SERPL-MCNC: 47 MG/DL (ref 40–75)
HDLC SERPL: 29.6 % (ref 20–50)
HGB BLD-MCNC: 14.7 G/DL (ref 12–16)
IRON SERPL-MCNC: 59 UG/DL (ref 30–160)
LDLC SERPL CALC-MCNC: 86.2 MG/DL (ref 63–159)
MCH RBC QN AUTO: 27.8 PG (ref 27–31)
MCHC RBC AUTO-ENTMCNC: 29.3 G/DL (ref 32–36)
MCV RBC AUTO: 95 FL (ref 82–98)
NONHDLC SERPL-MCNC: 112 MG/DL
PLATELET # BLD AUTO: 259 K/UL (ref 150–450)
PMV BLD AUTO: 10.7 FL (ref 9.2–12.9)
POTASSIUM SERPL-SCNC: 5 MMOL/L (ref 3.5–5.1)
PROT SERPL-MCNC: 7.8 G/DL (ref 6–8.4)
RBC # BLD AUTO: 5.29 M/UL (ref 4–5.4)
SODIUM SERPL-SCNC: 139 MMOL/L (ref 136–145)
TRIGL SERPL-MCNC: 129 MG/DL (ref 30–150)
VIT B12 SERPL-MCNC: 716 PG/ML (ref 210–950)
WBC # BLD AUTO: 9.48 K/UL (ref 3.9–12.7)

## 2021-10-15 PROCEDURE — 82607 VITAMIN B-12: CPT | Performed by: FAMILY MEDICINE

## 2021-10-15 PROCEDURE — 82746 ASSAY OF FOLIC ACID SERUM: CPT | Performed by: FAMILY MEDICINE

## 2021-10-15 PROCEDURE — 83036 HEMOGLOBIN GLYCOSYLATED A1C: CPT | Performed by: FAMILY MEDICINE

## 2021-10-15 PROCEDURE — 36415 COLL VENOUS BLD VENIPUNCTURE: CPT | Mod: PO | Performed by: FAMILY MEDICINE

## 2021-10-15 PROCEDURE — 85027 COMPLETE CBC AUTOMATED: CPT | Performed by: FAMILY MEDICINE

## 2021-10-15 PROCEDURE — 80053 COMPREHEN METABOLIC PANEL: CPT | Performed by: FAMILY MEDICINE

## 2021-10-15 PROCEDURE — 82728 ASSAY OF FERRITIN: CPT | Performed by: FAMILY MEDICINE

## 2021-10-15 PROCEDURE — 83540 ASSAY OF IRON: CPT | Performed by: FAMILY MEDICINE

## 2021-10-15 PROCEDURE — 80061 LIPID PANEL: CPT | Performed by: FAMILY MEDICINE

## 2021-10-18 ENCOUNTER — TELEPHONE (OUTPATIENT)
Dept: FAMILY MEDICINE | Facility: CLINIC | Age: 76
End: 2021-10-18

## 2021-10-18 DIAGNOSIS — S01.319A TEAR OF EARLOBE, UNSPECIFIED LATERALITY, INITIAL ENCOUNTER: Primary | ICD-10-CM

## 2021-11-01 ENCOUNTER — PES CALL (OUTPATIENT)
Dept: ADMINISTRATIVE | Facility: CLINIC | Age: 76
End: 2021-11-01
Payer: MEDICARE

## 2021-11-26 ENCOUNTER — OFFICE VISIT (OUTPATIENT)
Dept: FAMILY MEDICINE | Facility: CLINIC | Age: 76
End: 2021-11-26
Payer: MEDICARE

## 2021-11-26 VITALS
SYSTOLIC BLOOD PRESSURE: 116 MMHG | RESPIRATION RATE: 14 BRPM | DIASTOLIC BLOOD PRESSURE: 72 MMHG | WEIGHT: 178 LBS | TEMPERATURE: 98 F | HEART RATE: 68 BPM | BODY MASS INDEX: 32.76 KG/M2 | HEIGHT: 62 IN

## 2021-11-26 DIAGNOSIS — N18.31 CHRONIC KIDNEY DISEASE, STAGE 3A: ICD-10-CM

## 2021-11-26 DIAGNOSIS — F03.90 DEMENTIA WITHOUT BEHAVIORAL DISTURBANCE, UNSPECIFIED DEMENTIA TYPE: ICD-10-CM

## 2021-11-26 DIAGNOSIS — R73.03 PREDIABETES: ICD-10-CM

## 2021-11-26 DIAGNOSIS — J44.9 CHRONIC OBSTRUCTIVE PULMONARY DISEASE, UNSPECIFIED COPD TYPE: Primary | ICD-10-CM

## 2021-11-26 PROCEDURE — 99999 PR PBB SHADOW E&M-EST. PATIENT-LVL V: ICD-10-PCS | Mod: PBBFAC,,, | Performed by: FAMILY MEDICINE

## 2021-11-26 PROCEDURE — 90694 VACC AIIV4 NO PRSRV 0.5ML IM: CPT | Mod: S$GLB,,, | Performed by: FAMILY MEDICINE

## 2021-11-26 PROCEDURE — G0008 FLU VACCINE - QUADRIVALENT - ADJUVANTED: ICD-10-PCS | Mod: S$GLB,,, | Performed by: FAMILY MEDICINE

## 2021-11-26 PROCEDURE — 99499 UNLISTED E&M SERVICE: CPT | Mod: S$GLB,,, | Performed by: FAMILY MEDICINE

## 2021-11-26 PROCEDURE — G0008 ADMIN INFLUENZA VIRUS VAC: HCPCS | Mod: S$GLB,,, | Performed by: FAMILY MEDICINE

## 2021-11-26 PROCEDURE — 99499 RISK ADDL DX/OHS AUDIT: ICD-10-PCS | Mod: S$GLB,,, | Performed by: FAMILY MEDICINE

## 2021-11-26 PROCEDURE — 90694 FLU VACCINE - QUADRIVALENT - ADJUVANTED: ICD-10-PCS | Mod: S$GLB,,, | Performed by: FAMILY MEDICINE

## 2021-11-26 PROCEDURE — 99214 PR OFFICE/OUTPT VISIT, EST, LEVL IV, 30-39 MIN: ICD-10-PCS | Mod: S$GLB,,, | Performed by: FAMILY MEDICINE

## 2021-11-26 PROCEDURE — 99214 OFFICE O/P EST MOD 30 MIN: CPT | Mod: S$GLB,,, | Performed by: FAMILY MEDICINE

## 2021-11-26 PROCEDURE — 99999 PR PBB SHADOW E&M-EST. PATIENT-LVL V: CPT | Mod: PBBFAC,,, | Performed by: FAMILY MEDICINE

## 2021-12-01 PROBLEM — N18.31 CHRONIC KIDNEY DISEASE, STAGE 3A: Status: ACTIVE | Noted: 2021-12-01

## 2021-12-08 RX ORDER — GABAPENTIN 600 MG/1
TABLET ORAL
Qty: 180 TABLET | Refills: 1 | Status: SHIPPED | OUTPATIENT
Start: 2021-12-08 | End: 2022-07-15

## 2021-12-10 ENCOUNTER — TELEPHONE (OUTPATIENT)
Dept: PAIN MEDICINE | Facility: CLINIC | Age: 76
End: 2021-12-10

## 2021-12-10 ENCOUNTER — OFFICE VISIT (OUTPATIENT)
Dept: PAIN MEDICINE | Facility: CLINIC | Age: 76
End: 2021-12-10
Payer: MEDICARE

## 2021-12-10 VITALS
WEIGHT: 175.13 LBS | SYSTOLIC BLOOD PRESSURE: 94 MMHG | OXYGEN SATURATION: 98 % | DIASTOLIC BLOOD PRESSURE: 60 MMHG | RESPIRATION RATE: 18 BRPM | TEMPERATURE: 97 F | BODY MASS INDEX: 31.03 KG/M2 | HEART RATE: 76 BPM | HEIGHT: 63 IN

## 2021-12-10 DIAGNOSIS — M51.34 DDD (DEGENERATIVE DISC DISEASE), THORACIC: ICD-10-CM

## 2021-12-10 DIAGNOSIS — M16.11 PRIMARY OSTEOARTHRITIS OF RIGHT HIP: ICD-10-CM

## 2021-12-10 DIAGNOSIS — M54.16 LUMBAR RADICULOPATHY: Primary | ICD-10-CM

## 2021-12-10 PROCEDURE — 99214 PR OFFICE/OUTPT VISIT, EST, LEVL IV, 30-39 MIN: ICD-10-PCS | Mod: S$GLB,,, | Performed by: ANESTHESIOLOGY

## 2021-12-10 PROCEDURE — 99499 RISK ADDL DX/OHS AUDIT: ICD-10-PCS | Mod: S$GLB,,, | Performed by: ANESTHESIOLOGY

## 2021-12-10 PROCEDURE — 99999 PR PBB SHADOW E&M-EST. PATIENT-LVL V: ICD-10-PCS | Mod: PBBFAC,,, | Performed by: ANESTHESIOLOGY

## 2021-12-10 PROCEDURE — 99999 PR PBB SHADOW E&M-EST. PATIENT-LVL V: CPT | Mod: PBBFAC,,, | Performed by: ANESTHESIOLOGY

## 2021-12-10 PROCEDURE — 99214 OFFICE O/P EST MOD 30 MIN: CPT | Mod: S$GLB,,, | Performed by: ANESTHESIOLOGY

## 2021-12-10 PROCEDURE — 99499 UNLISTED E&M SERVICE: CPT | Mod: S$GLB,,, | Performed by: ANESTHESIOLOGY

## 2021-12-10 RX ORDER — ALPRAZOLAM 0.5 MG/1
1 TABLET, ORALLY DISINTEGRATING ORAL ONCE AS NEEDED
Status: CANCELLED | OUTPATIENT
Start: 2021-12-10 | End: 2033-05-08

## 2021-12-10 NOTE — H&P (VIEW-ONLY)
This note was completed with dictation software and grammatical errors may exist.    CC: Right leg pain     HPI: The patient is a 76-year-old woman with a history of COPD, multiple lumbar compression fractures, anxiety who presents in referral from Dr. Kline for right leg pain.  The patient returns in follow-up with her daughter who helps with history.  I had last seen her about 4 years ago, she had right hip joint arthritis, spinal stenosis and we performed a right L4/5 transforaminal injection in 2018. At the time when I had seen her, she seemed to have severe right hip joint pain but also right leg pain.  I had sent her for a right hip joint injection but it does not appear that it had been completed, nonetheless we had done an L4/5 transforaminal injection and the patient believes that she had significant relief, the daughter cannot recall if she was much better.  Nonetheless apparently things have been worsening to the point where she is having a great deal of pain on a constant basis in her right buttock and posterior thigh, much better when she is sitting and not moving, worse when she is walking.  She uses a walker, states that she feels a pop in her buttock and had pain down the back of her leg to the knee.  She does report pain further down the leg at times.  She continues to take Naprosyn twice daily, 3000 mg of Tylenol a day and tramadol occasionally with some benefit.    Previous history: The patient states that she has had back pain and right leg pain for about 2 years but seems to be worsening recently.  She is a poor historian, her daughter is present with her today to help with the history.  She seems to state that the pain is in the right thigh, right groin, right buttock but also reports pain radiating all the way down the leg to the foot.  It is worse with walking, worse in the morning, worse at night and worse with getting out of a bed or a chair.  She does get some relief with sitting down,  rest and medications.  She is taking Advil and Tylenol with mild relief.  She denies any alyce weakness, no bowel or bladder incontinence.      Pain intervention history:  She is status post right L4/5 transforaminal injection on 2018.    Spine surgeries:    Antineuropathics:  NSAIDs:  Tylenol, Naprosyn  Physical therapy:  Has done physical therapy in the past with no benefit  Antidepressants:  Muscle relaxers:  Opioids:  Tramadol  Antiplatelets/Anticoagulants:                ROS: She reports fatigability, skin color change, vision change, hoarse voice, chest pain, wheezing and cough, constipation, easy bruising, joint stiffness, joint swelling, back pain, memory loss, difficulty sleeping, anxiety and loss of balance.  Balance of review of systems is negative.    Past Medical History:   Diagnosis Date    Anticoagulant long-term use     Anxiety 2016    Back pain     Closed fracture of second lumbar vertebra with routine healing 2015    COPD (chronic obstructive pulmonary disease) 2016    Emphysema of lung     Encounter for blood transfusion     Gastric ulcer     Hypertension        Past Surgical History:   Procedure Laterality Date    ESOPHAGOGASTRODUODENOSCOPY N/A 3/22/2019    Procedure: EGD (ESOPHAGOGASTRODUODENOSCOPY);  Surgeon: Nomi Fajardo Jr., MD;  Location: [facility];  Service: Endoscopy;  Laterality: N/A;    HYSTERECTOMY         Social History     Socioeconomic History    Marital status:    Tobacco Use    Smoking status: Former Smoker     Packs/day: 1.00     Quit date: 10/26/2014     Years since quittin.1    Smokeless tobacco: Never Used   Substance and Sexual Activity    Alcohol use: Yes     Alcohol/week: 0.0 standard drinks     Comment: Social    Drug use: No   Social History Narrative    Originally from [place].         Medications/Allergies: See med card    Vitals:    12/10/21 0956   BP: 94/60   Pulse: 76   Resp: 18   Temp: 97.4 °F (36.3 °C)   TempSrc:  "Oral   SpO2: 98%   Weight: 79.5 kg (175 lb 2.5 oz)   Height: 5' 3" (1.6 m)   PainSc:   8   PainLoc: Back         Physical exam:  Gen: A and O x3, pleasant, well-groomed  Skin: No rashes or obvious lesions  HEENT: PERRLA, no obvious deformities on ears or in canals. Trachea midline.  CVS: Regular rate and rhythm, normal palpable pulses.  Resp:No increased work of breathing, symmetrical chest rise.  Abdomen: Soft, NT/ND.    Musculoskeletal: Antalgic gait secondary to right leg pain, uses rolling walker pain.      Neuro  :Lower extremities: 5/5 strength bilaterally, except for 4/5 hip flexion right side  Reflexes: Patellar 3+, Achilles 2+ bilaterally.  Sensory:  Intact and symmetrical to light touch and pinprick in L2-S1 dermatomes bilaterally.    Lumbar spine:  Lumbar spine: Range of motion is moderately reduced with both flexion and extension with increased pain in the low back and right buttock with oblique extension.  Jona's test causes no increased pain on either side.    Supine straight leg raise is negative bilaterally.    Internal and external rotation of the hip causes severe right hip, buttock and groin pain.    Myofascial exam: No tenderness to palpation across lumbar paraspinous muscles.    Imaging:  10/13/16 MRI L-spine  1.  Healing relatively severe superior endplate compression fracture of the L2 vertebral body with retropulsion of the posterior vertebral body cortex.  2.  L1-2 severe spinal stenosis from a combination of the L2 vertebral body compression fracture, mild disc bulge, and degenerative facet arthrosis.  3.  L2-3 minimal disc bulge and mild degenerative facet arthrosis.  4.  L3-4 minimal disc bulge.  There is mild anterior L3 subluxation due to degenerative facet arthrosis.  5.  L4-5 moderate spinal stenosis due to degenerative facet arthrosis.  6.  L5-S1 degenerative disc disease and degenerative facet arthrosis.  There is a cystic nodule anterior to the left L5-S1 facet joint which is " most likely a synovial cyst and less likely a meningeal cyst.  The cystic nodule impinges the left-sided nerve roots within the left lateral recess of S1.  7.  Old moderate superior endplate compression fracture of the T11 vertebral body.    9/26/16 Xray L-spine  There is lower thoracic and upper lumbar scoliosis convex to the right.  The scoliotic angle measures 5 degrees from the superior endplate of T12 to the inferior endplate of L4.  There is a superior endplate compression fracture of the T11 vertebral body resulting in a wedge deformity and approximately 30 % loss of vertebral body height.  There is a relatively severe superior endplate compression fracture of the L2 vertebral body resulting in a wedge deformity and approximately 70 % loss of vertebral body height.  There is a superior endplate compression fracture of the L4 vertebral body resulting in a wedge deformity and approximately 30 % loss of vertebral body height.  There is degenerative facet arthrosis at the L5-S1 level resulting in grade 2 anterolisthesis of L5 over distance of approximately 8 mm.  There is degenerative disc disease at L5-S1 level with disc space narrowing.    3/18/21 Xray L-spine:  An anterolisthesis is noted of 1.5 cm of the L5 on S1 vertebral body.  Compression fracture is noted of the L2 vertebral body with 75% vertebral body height loss.  Compression fracture is noted at the L4 vertebral body with approximately 50% vertebral body height loss.  Atherosclerotic calcifications are noted within the aorta with aneurysmal dilatation suspected distally to approximately 3.5 cm.  This can be confirmed on the CT of 03/10/2020.  A mild dextrocurvature is noted to the thoracolumbar junction.    Assessment:  The patient is a 76-year-old woman with a history of COPD, multiple lumbar compression fractures, anxiety who presents in referral from Dr. Kline for right leg pain.     1. Lumbar radiculopathy  Case Request Operating Room:  Injection,steroid,epidural,transforaminal approach L4/5   2. Primary osteoarthritis of right hip     3. DDD (degenerative disc disease), thoracic           Plan:  1. We reviewed her lumbar spine imaging, right hip imaging.  She has a grade 2 anterolisthesis of L5/S1 with foraminal stenosis to the right side but especially canal narrowing at L4/5.  She also has severe osteoarthritis of the right hip joint both of which could be causing the pain.  We discussed that she would like to try an epidural steroid injection because she believes this helped a great deal last time.  We will set her up for right L4/5 transforaminal injection and have her follow up in several weeks after the injection.  If she does not have relief with this, we could consider right hip joint injection, right hip RFA.

## 2021-12-23 ENCOUNTER — PES CALL (OUTPATIENT)
Dept: ADMINISTRATIVE | Facility: CLINIC | Age: 76
End: 2021-12-23
Payer: MEDICARE

## 2021-12-30 ENCOUNTER — HOSPITAL ENCOUNTER (OUTPATIENT)
Dept: RADIOLOGY | Facility: HOSPITAL | Age: 76
Discharge: HOME | End: 2021-12-30
Attending: ANESTHESIOLOGY
Payer: MEDICARE

## 2021-12-30 ENCOUNTER — HOSPITAL ENCOUNTER (OUTPATIENT)
Facility: HOSPITAL | Age: 76
Discharge: HOME | End: 2021-12-30
Attending: ANESTHESIOLOGY | Admitting: ANESTHESIOLOGY
Payer: MEDICARE

## 2021-12-30 DIAGNOSIS — M54.16 LUMBAR RADICULOPATHY: ICD-10-CM

## 2021-12-30 LAB — SARS-COV-2 RDRP RESP QL NAA+PROBE: NEGATIVE

## 2021-12-30 PROCEDURE — 64483 PR EPIDURAL INJ, ANES/STEROID, TRANSFORAMINAL, LUMB/SACR, SNGL LEVL: ICD-10-PCS | Mod: RT,,, | Performed by: ANESTHESIOLOGY

## 2021-12-30 PROCEDURE — 25500020 PHARM REV CODE 255: Mod: PO | Performed by: ANESTHESIOLOGY

## 2021-12-30 PROCEDURE — 25000003 PHARM REV CODE 250: Mod: PO | Performed by: ANESTHESIOLOGY

## 2021-12-30 PROCEDURE — 63600175 PHARM REV CODE 636 W HCPCS: Mod: PO | Performed by: ANESTHESIOLOGY

## 2021-12-30 PROCEDURE — 76000 FLUOROSCOPY <1 HR PHYS/QHP: CPT | Mod: TC,PO

## 2021-12-30 PROCEDURE — A9579 GAD-BASE MR CONTRAST NOS,1ML: HCPCS | Mod: PO | Performed by: ANESTHESIOLOGY

## 2021-12-30 PROCEDURE — 64483 NJX AA&/STRD TFRM EPI L/S 1: CPT | Mod: PO | Performed by: ANESTHESIOLOGY

## 2021-12-30 PROCEDURE — 64483 NJX AA&/STRD TFRM EPI L/S 1: CPT | Mod: RT,,, | Performed by: ANESTHESIOLOGY

## 2021-12-30 PROCEDURE — U0002 COVID-19 LAB TEST NON-CDC: HCPCS | Mod: PO | Performed by: ANESTHESIOLOGY

## 2021-12-30 RX ORDER — METHYLPREDNISOLONE ACETATE 40 MG/ML
INJECTION, SUSPENSION INTRA-ARTICULAR; INTRALESIONAL; INTRAMUSCULAR; SOFT TISSUE
Status: DISCONTINUED | OUTPATIENT
Start: 2021-12-30 | End: 2021-12-30 | Stop reason: HOSPADM

## 2021-12-30 RX ORDER — LIDOCAINE HYDROCHLORIDE 10 MG/ML
INJECTION, SOLUTION EPIDURAL; INFILTRATION; INTRACAUDAL; PERINEURAL
Status: DISCONTINUED | OUTPATIENT
Start: 2021-12-30 | End: 2021-12-30 | Stop reason: HOSPADM

## 2021-12-30 RX ORDER — ALPRAZOLAM 0.5 MG/1
1 TABLET, ORALLY DISINTEGRATING ORAL ONCE AS NEEDED
Status: COMPLETED | OUTPATIENT
Start: 2021-12-30 | End: 2021-12-30

## 2021-12-30 RX ORDER — BUPIVACAINE HYDROCHLORIDE 2.5 MG/ML
INJECTION, SOLUTION EPIDURAL; INFILTRATION; INTRACAUDAL
Status: DISCONTINUED | OUTPATIENT
Start: 2021-12-30 | End: 2021-12-30 | Stop reason: HOSPADM

## 2021-12-30 RX ADMIN — ALPRAZOLAM 0.5 MG: 0.5 TABLET, ORALLY DISINTEGRATING ORAL at 12:12

## 2021-12-30 NOTE — OP NOTE
PROCEDURE DATE: 12/30/2021    PROCEDURE: Right L4/5 transforaminal epidural steroid injection under fluoroscopy    DIAGNOSIS: Lumbar  Radiculopathy    Post op diagnosis: Same    PHYSICIAN: Casey Mccall MD    MEDICATIONS INJECTED:  Methylprednisolone 40mg (1ml) and 1ml 0.25% bupivicaine at each nerve root.     LOCAL ANESTHETIC INJECTED:  Lidocaine 1%. 4 ml per site.    SEDATION MEDICATIONS: none    ESTIMATED BLOOD LOSS:  none    COMPLICATIONS:  none    TECHNIQUE:   A time-out was taken to identify patient and procedure side prior to starting the procedure. The patient was placed in a prone position, prepped and draped in the usual sterile fashion using ChloraPrep and sterile towels.  The area to be injected was determined under fluoroscopic guidance in AP and oblique view.  Local anesthetic was given by raising a wheal and going down to the hub of a 25-gauge 1.5 inch needle.  In oblique view, a 5 inch 22-gauge bent-tip spinal needle was introduced towards 6 oclock position of the pedicle of each above named nerve root level.  The needle was walked medially then hinged into the neural foramen and position was confirmed in AP and lateral views.  Omniscan contrast dye was injected to confirm appropriate placement and that there was no vascular uptake.  After negative aspiration for blood or CSF, the medication was then injected. This was performed at the right L4/5 level(s). The patient tolerated the procedure well.    The patient was monitored after the procedure.  Patient was given post procedure and discharge instructions to follow at home. The patient was discharged in a stable condition.

## 2021-12-30 NOTE — DISCHARGE INSTRUCTIONS
PAIN MANAGEMENT    Home care instructions   Apply ice pack to the injection site for 20 minute prior for the first 24 hours for soreness/discomfort at injection site   DO NOT USE HEAT FOR 24 HOURS   Keep site clean and dry for 24 hours, remove bandaid when desired   Do not drive until tomorrow  Take care when walking after a lumbar injection     STEROIDS OR RADIOFREQUENCY    May take 10-14 days for full effects  Avoid strenuous exercises for 2 days        CALL PHYSICIAN FOR:   Severe increase in your usual pain or appearance of new pain   Prolonged or increasing weakness or numbness in the legs or arms   Fever greater then 100 degrees F..   Drainage from the incision site, redness, active bleeding or increased swelling at the injection site   Headache that increases when your head is upright and decreases when you lie flat    FOR EMERGENCIES:   Go directly to Emergency Department for Shortness of breath, chest pain, or problems breathing

## 2021-12-30 NOTE — DISCHARGE SUMMARY
[facility] - [facility]  Discharge Note  Short Stay    Procedure(s) (LRB):  Injection,steroid,epidural,transforaminal approach L4/5 (Right)    OUTCOME: Patient tolerated treatment/procedure well without complication and is now ready for discharge.    DISPOSITION: Home or Self Care    FINAL DIAGNOSIS:  Lumbar radiculopathy    FOLLOWUP: In clinic    DISCHARGE INSTRUCTIONS:    Discharge Procedure Orders   Diet Adult Regular     No dressing needed     Notify your health care provider if you experience any of the following:  temperature >100.4     Activity as tolerated

## 2022-01-03 VITALS
RESPIRATION RATE: 14 BRPM | TEMPERATURE: 98 F | HEIGHT: 63 IN | SYSTOLIC BLOOD PRESSURE: 147 MMHG | BODY MASS INDEX: 31.01 KG/M2 | DIASTOLIC BLOOD PRESSURE: 61 MMHG | HEART RATE: 79 BPM | WEIGHT: 175 LBS | OXYGEN SATURATION: 90 %

## 2022-01-04 NOTE — LETTER
February 19, 2017      Nuzhat Kline MD  [address]  [place] [address]           [facility]  [address] [facility] [address] [place] [address]  Phone: [phone]          Patient: Luda Peters   MR Number: [id number]   YOB: 1945   Date of Visit: 2/16/2017       Dear Dr. Nuzhat Kline:    Thank you for referring Luda Peters to me for evaluation. Attached you will find relevant portions of my assessment and plan of care.    If you have questions, please do not hesitate to call me. I look forward to following Luda Peters along with you.    Sincerely,    Casey Mccall MD    Enclosure  CC:  No Recipients    If you would like to receive this communication electronically, please contact [email or web address] or [phone] to request more information on [facility] access.    For providers and/or their staff who would like to refer a patient to [facility], please contact us through our one-stop-shop provider referral line, [facility], at [phone].    If you feel you have received this communication in error or would no longer like to receive these types of communications, please e-mail [email or web address]         
impairments found/rehab potential/anticipated equipment needs at discharge/anticipated discharge recommendation

## 2022-01-20 ENCOUNTER — TELEPHONE (OUTPATIENT)
Dept: FAMILY MEDICINE | Facility: CLINIC | Age: 77
End: 2022-01-20
Payer: MEDICARE

## 2022-01-20 NOTE — TELEPHONE ENCOUNTER
Spoke with pt daughter and notified that pt needs to be evaluated at [facility] due to symptoms. Pt daughter verbalized understanding.

## 2022-01-20 NOTE — TELEPHONE ENCOUNTER
----- Message from Senait Stone sent at 1/19/2022  4:52 PM CST -----  Regarding: sooner appt 01/27  Type:  Sooner Apoointment Request    Caller is requesting a sooner appointment.  Caller declined first available appointment listed below.  Caller will not accept being placed on the waitlist and is requesting a message be sent to doctor.    Name of Caller:  Gabrielle daughter  When is the first available appointment?  01/27  Symptoms:  coughing and congestion  Best Call Back Number:  [phone] (home)     Additional Information: na

## 2022-01-24 PROBLEM — J96.20 ACUTE AND CHRONIC RESPIRATORY FAILURE, UNSPECIFIED WHETHER WITH HYPOXIA OR HYPERCAPNIA: Status: ACTIVE | Noted: 2022-01-24

## 2022-01-24 PROBLEM — S82.409A TIBIA/FIBULA FRACTURE: Status: ACTIVE | Noted: 2022-01-24

## 2022-01-24 PROBLEM — Z71.89 ACP (ADVANCE CARE PLANNING): Status: ACTIVE | Noted: 2022-01-24

## 2022-01-24 PROBLEM — S82.209A TIBIA/FIBULA FRACTURE: Status: ACTIVE | Noted: 2022-01-24

## 2022-01-26 PROBLEM — R07.9 CHEST PAIN: Status: ACTIVE | Noted: 2022-01-26

## 2022-01-28 ENCOUNTER — TELEPHONE (OUTPATIENT)
Dept: PAIN MEDICINE | Facility: CLINIC | Age: 77
End: 2022-01-28

## 2022-01-28 ENCOUNTER — TELEPHONE (OUTPATIENT)
Dept: FAMILY MEDICINE | Facility: CLINIC | Age: 77
End: 2022-01-28
Payer: MEDICARE

## 2022-01-28 NOTE — TELEPHONE ENCOUNTER
Spoke with pt daughter. Requesting a call from the provider to discuss pt status.   Please advise.

## 2022-01-28 NOTE — TELEPHONE ENCOUNTER
Returned call, no answer, left message to let her know that I've been reviewing the hosp notes, and to msg or callback if any questions or concerns.

## 2022-01-28 NOTE — TELEPHONE ENCOUNTER
----- Message from Bertha Holloway sent at 1/28/2022 11:39 AM CST -----  Contact: Patient daughter  Type:  Needs Medical Advice    Who Called:  Daughter Gabrielle       Would the patient rather a call back or a response via [facility]?  Call    Best Call Back Number:  [phone]     Additional Information:  Patient has been admitted to [facility] for Hypercapnia and secondary at tib fib fracture      Daughter needs to speak to the nurse to let them know what is going on     Please call to advise

## 2022-01-28 NOTE — TELEPHONE ENCOUNTER
----- Message from Kathy Zhou sent at 1/28/2022  3:18 PM CST -----  Regarding: FYI  Contact: daughterGabrielle  Type: Needs Medical Advice  Who Called:  Gabrielle vaz  Best Call Back Number: [phone]  Additional Information: Daughter wanted doctor to know why the appointment was canceled. Patient was admitted to [facility] on Monday 01/25/22 for a fall. She fractured her tibia and fibula and then she went in to hypercapnia. Her Co2 level was 99. Please call daughter if any questions. Thanks!         none

## 2022-02-07 ENCOUNTER — TELEPHONE (OUTPATIENT)
Dept: ORTHOPEDICS | Facility: CLINIC | Age: 77
End: 2022-02-07
Payer: MEDICARE

## 2022-02-07 NOTE — TELEPHONE ENCOUNTER
Called daughter Gabrielle to schedule pt come in this week for casting. Gabrielle says pt is still admitted in hospital. Pt is supposed to be discharged to another facility but Gabrielle does not know when. Scheduled appt for tomorrow at 11am. She will call if appt needs to be changed. ThanksSuzanne

## 2022-02-08 ENCOUNTER — TELEPHONE (OUTPATIENT)
Dept: ORTHOPEDICS | Facility: CLINIC | Age: 77
End: 2022-02-08
Payer: MEDICARE

## 2022-02-08 NOTE — TELEPHONE ENCOUNTER
Called Gabrielle. Let her know that we will cancel todays appt because pt is still in the hospital. Advised her to call us when pt is transferred to other facility. So we can have them bring her in. Understanding verbalized. Appt cancelled. Thanks, Suzanne

## 2022-02-11 ENCOUNTER — TELEPHONE (OUTPATIENT)
Dept: FAMILY MEDICINE | Facility: CLINIC | Age: 77
End: 2022-02-11
Payer: MEDICARE

## 2022-02-11 NOTE — TELEPHONE ENCOUNTER
----- Message from Beatriz Olson, Patient Care Assistant sent at 2/11/2022  2:43 PM CST -----  Contact: Gabrielle  Type: Needs Medical Advice    Who Called: Pt Daughter Gabrielle  Best Call Back Number: [phone] or [phone]    Inquiry/Question: Pt daughter wants to change the appt scheduled on 02/25/2022 to a virtual appt due to pt just being discharged from the hospital. Please call back and advise Thank you~

## 2022-02-12 ENCOUNTER — NURSE TRIAGE (OUTPATIENT)
Dept: ADMINISTRATIVE | Facility: CLINIC | Age: 77
End: 2022-02-12
Payer: MEDICARE

## 2022-02-12 PROCEDURE — G0180 PR HOME HEALTH MD CERTIFICATION: ICD-10-PCS | Mod: ,,, | Performed by: FAMILY MEDICINE

## 2022-02-12 PROCEDURE — G0180 MD CERTIFICATION HHA PATIENT: HCPCS | Mod: ,,, | Performed by: FAMILY MEDICINE

## 2022-02-12 NOTE — TELEPHONE ENCOUNTER
Luda's daughter Gabrielle called to say she was discharged to home 02/10/2022 from [facility] after 17 days in-patient, treated for respiratory problems, left tib fib fx, decubitus of heel, and sent home on BiPAP machine  (which is new to family).  Gabrielle states received complete instructions and Luda is doing well on it. Also on home O2 prior to hospitalization for COPD and emphysema.  Said BiPAP  was all set for her prior to discharge and nothing has been changed since home.  She said her mother told her that her stomach feels bloated today. Gabrielle is calling about this. Says she is only on BiPAP about 5 hours a day, and is working up to full time use. This is very new to patient and family.  She was 93% by pulse ox this morning, on RA, and Gabrielle states that she has gone as low as 84-88% in the past, but not since hospital discharge. Now at 94% during this call. They wonder if her feeling of being bloated is r/t constipation, as she had not had BM since Thursday, and was being given laxatives in the hospital, but today she said she had large, soft stool without any difficulty or pain.  She has not had any narcotic pain medication since discharge Thursday, and is taking stool softener daily.  Drinks plenty of water. Said she is sometime uncomfortable across the top of her abdomen, but not in any pain, no SOB, no CP, no fever, able to hold conversation without becoming noticeably SOB.  Drinking plenty of water.  Home care advice given, to include monitor for even minor abdominal pain lasting an hour, any difficulty breathing, worsening symptoms, oxygen saturation levels <94%, new concerns or questions, and call back for any.  Of note, discharge summary indicated need for appt with pulmonology, Dr Fadi Stanley with [facility].  Gabrielle would like call from pulmonary to go over settings on BiPAP and obtain any new recommendations.  Message also to Dr Kline, pcp. She does have appt with Dr Barahona  Cj for consult 03/04/2022.  Please contact caller directly with any additional care advice.      Reason for Disposition   Taking new prescription medication    Additional Information   Negative: [1] Abdomen pain is main symptom AND [2] male   Negative: [1] Abdomen pain is main symptom AND [2] adult female   Negative: Rectal bleeding or blood in stool is main symptom   Negative: Rectal pain or itching is main symptom   Negative: Constipation in a cancer patient who is currently (or recently) receiving chemotherapy or radiation therapy, or cancer patient who has metastatic or end-stage cancer and is receiving palliative care   Negative: Patient sounds very sick or weak to the triager   Negative: [1] Vomiting AND [2] abdomen looks much more swollen than usual   Negative: [1] Vomiting AND [2] contains bile (green color)   Negative: [1] Constant abdominal pain AND [2] present > 2 hours   Negative: [1] Rectal pain or fullness from fecal impaction (rectum full of stool) AND [2] NOT better after SITZ bath, suppository or enema   Negative: [1] Intermittent mild abdominal pain AND [2] fever   Negative: Abdomen is more swollen than usual   Negative: Last bowel movement (BM) > 4 days ago   Negative: Leaking stool   Negative: Unable to have a bowel movement (BM) without manually removing stool (using finger to pull out stool or perform disimpaction)   Negative: Unable to have a bowel movement (BM) without laxative or enema   Negative: [1] Constipation persists > 1 week AND [2] no improvement after using CARE ADVICE   Negative: [1] Weight loss > 10 pounds (5 kg) AND [2] not dieting   Negative: Pencil-like, narrow stools    Protocols used: CONSTIPATION-A-AH

## 2022-02-14 NOTE — TELEPHONE ENCOUNTER
Spoke with pt's daughter Gabrielle. Made an appt for hospital f/u with NP Rahat Flores.  Will get Bipap compliance from [facility] for appt for Rahat to review.

## 2022-02-18 ENCOUNTER — PATIENT MESSAGE (OUTPATIENT)
Dept: FAMILY MEDICINE | Facility: CLINIC | Age: 77
End: 2022-02-18
Payer: MEDICARE

## 2022-02-18 DIAGNOSIS — J96.10 CHRONIC RESPIRATORY FAILURE, UNSPECIFIED WHETHER WITH HYPOXIA OR HYPERCAPNIA: Primary | ICD-10-CM

## 2022-02-21 NOTE — TELEPHONE ENCOUNTER
Orders done.  Please move their appt with me that day to help accommodate so that she's not going back/forth to [place] twice. Can also do this as a vv since she's seeing pulm that day already. Ok to overbook my schedule if needed.

## 2022-02-21 NOTE — TELEPHONE ENCOUNTER
Spoke with pt daughter. Pt is scheduled for VV on 2/25/22. Lab orders faxed to [facility] per daughter's request. Informed daughter to let us know if anything else needed. Verbalized understanding.

## 2022-02-22 RX ORDER — CLOPIDOGREL BISULFATE 75 MG/1
TABLET ORAL
Qty: 90 TABLET | Refills: 1 | Status: SHIPPED | OUTPATIENT
Start: 2022-02-22 | End: 2022-08-09

## 2022-02-22 NOTE — TELEPHONE ENCOUNTER
Care Due:                  Date            Visit Type   Department     Provider  --------------------------------------------------------------------------------                                EP -                              PRIMARY      [facility]  Last Visit: 11-      CARE ([facility])   MEDICINE       Nuzhat Ferrer  [facility]  Next Visit: 02-      FOLLOW UP    MEDICINE       Nuzhatmaria elena Mcbrideo  Elder                                                            Last  Test          Frequency    Reason                     Performed    Due Date  --------------------------------------------------------------------------------    Uric Acid...  12 months..  allopurinoL..............  05- 05-    Powered by [employer] by [employer]. Reference number: [id number].   2/22/2022 12:05:51 PM CST

## 2022-02-24 ENCOUNTER — EXTERNAL HOME HEALTH (OUTPATIENT)
Dept: HOME HEALTH SERVICES | Facility: HOSPITAL | Age: 77
End: 2022-02-24
Payer: MEDICARE

## 2022-02-24 ENCOUNTER — DOCUMENT SCAN (OUTPATIENT)
Dept: HOME HEALTH SERVICES | Facility: HOSPITAL | Age: 77
End: 2022-02-24
Payer: MEDICARE

## 2022-02-25 ENCOUNTER — OFFICE VISIT (OUTPATIENT)
Dept: FAMILY MEDICINE | Facility: CLINIC | Age: 77
End: 2022-02-25
Payer: MEDICARE

## 2022-02-25 DIAGNOSIS — S82.401D CLOSED FRACTURE OF RIGHT TIBIA AND FIBULA WITH ROUTINE HEALING, SUBSEQUENT ENCOUNTER: ICD-10-CM

## 2022-02-25 DIAGNOSIS — J96.22 ACUTE ON CHRONIC RESPIRATORY FAILURE WITH HYPOXIA AND HYPERCAPNIA: ICD-10-CM

## 2022-02-25 DIAGNOSIS — I70.0 ATHEROSCLEROSIS OF AORTA: Primary | ICD-10-CM

## 2022-02-25 DIAGNOSIS — J96.21 ACUTE ON CHRONIC RESPIRATORY FAILURE WITH HYPOXIA AND HYPERCAPNIA: ICD-10-CM

## 2022-02-25 DIAGNOSIS — S82.201D CLOSED FRACTURE OF RIGHT TIBIA AND FIBULA WITH ROUTINE HEALING, SUBSEQUENT ENCOUNTER: ICD-10-CM

## 2022-02-25 PROCEDURE — 99213 PR OFFICE/OUTPT VISIT, EST, LEVL III, 20-29 MIN: ICD-10-PCS | Mod: 95,S$GLB,, | Performed by: FAMILY MEDICINE

## 2022-02-25 PROCEDURE — 99999 PR PBB SHADOW E&M-EST. PATIENT-LVL II: ICD-10-PCS | Mod: PBBFAC,,, | Performed by: FAMILY MEDICINE

## 2022-02-25 PROCEDURE — 1111F DSCHRG MED/CURRENT MED MERGE: CPT | Mod: CPTII,95,S$GLB, | Performed by: FAMILY MEDICINE

## 2022-02-25 PROCEDURE — 99213 OFFICE O/P EST LOW 20 MIN: CPT | Mod: 95,S$GLB,, | Performed by: FAMILY MEDICINE

## 2022-02-25 PROCEDURE — 1111F PR DISCHARGE MEDS RECONCILED W/ CURRENT OUTPATIENT MED LIST: ICD-10-PCS | Mod: CPTII,95,S$GLB, | Performed by: FAMILY MEDICINE

## 2022-02-25 PROCEDURE — 99499 UNLISTED E&M SERVICE: CPT | Mod: 95,,, | Performed by: FAMILY MEDICINE

## 2022-02-25 PROCEDURE — 99499 RISK ADDL DX/OHS AUDIT: ICD-10-PCS | Mod: 95,,, | Performed by: FAMILY MEDICINE

## 2022-02-25 PROCEDURE — 99999 PR PBB SHADOW E&M-EST. PATIENT-LVL II: CPT | Mod: PBBFAC,,, | Performed by: FAMILY MEDICINE

## 2022-02-25 NOTE — PROGRESS NOTES
Subjective:       Patient ID: Luda Peters is a 76 y.o. female.    Chief Complaint: Medication Refill      The patient location is: home, [place]  The chief complaint leading to consultation is: hosp f/u  Visit type: Virtual visit with synchronous audio and video  Total time spent with patient: 20mins  Each patient to whom he or she provides medical services by telemedicine is:  (1) informed of the relationship between the physician and patient and the respective role of any other health care provider with respect to management of the patient; and (2) notified that he or she may decline to receive medical services by telemedicine and may withdraw from such care at any time.    Notes: Patient seen for hosp f/u.  Admission for distal tib-fib fx. F/u with ortho/hontas. In a cast for another 3 weeks.   Gabapentin dose adjusted to 300mg. Pain pretty well-controlled, not requiring much in the way of med use.   No increase in pedal edema. Hospital discontinued lasix and spironolactone, but she was given IV lasix several times during hospitalization.    +urinary frequency, but no dysuria.  Completed iv/oral abx for pna. On 2.5L supplemental O2 and bipap. Has pulm appt today.   Bowels moving, but still a little sluggish. Taking stool softener BID. No gerd c/o.     Review of Systems   Constitutional: Negative for fatigue and unexpected weight change.   HENT: Negative for congestion and sore throat.    Eyes: Negative for photophobia and visual disturbance.   Respiratory: Positive for cough and shortness of breath.    Cardiovascular: Negative for palpitations and leg swelling.   Gastrointestinal: Positive for constipation. Negative for abdominal pain, blood in stool and diarrhea.   Genitourinary: Positive for frequency. Negative for difficulty urinating.   Musculoskeletal: Positive for arthralgias, gait problem and myalgias.   Neurological: Positive for dizziness. Negative for light-headedness and headaches.    Psychiatric/Behavioral: Negative for sleep disturbance (more comfortable in her recliner of late). The patient is nervous/anxious.        Objective:              Assessment:   Atherosclerosis of aorta    Closed fracture of right tibia and fibula with routine healing, subsequent encounter    Acute on chronic respiratory failure with hypoxia and hypercapnia    leg/wound/weight-bearing per ortho.  Discussed compression stocking use, salt avoidance.   Ensure adequate hydration.     Patient aware of limitations to assessment and exam of telemedicine. Deemed appropriate at this time given current covid-19 concerns.

## 2022-02-25 NOTE — PROGRESS NOTES
Subjective:       Patient ID: Luda Peters is a 76 y.o. female.    Chief Complaint: No chief complaint on file.      The patient location is: home, [place]  The chief complaint leading to consultation is: hosp f/u  Visit type: Virtual visit with synchronous audio and video  Total time spent with patient: 20mins  Each patient to whom he or she provides medical services by telemedicine is:  (1) informed of the relationship between the physician and patient and the respective role of any other health care provider with respect to management of the patient; and (2) notified that he or she may decline to receive medical services by telemedicine and may withdraw from such care at any time.    Notes: Patient seen for hosp f/u.  Admission for distal tib-fib fx. F/u with ortho/[facility]. In a cast for another 3 weeks.   Gabapentin dose adjusted to 300mg. Pain pretty well-controlled, not requiring much in the way of med use.   No increase in pedal edema. Hospital discontinued lasix and spironolactone, but she was given IV lasix several times during hospitalization.    +urinary frequency, but no dysuria.  Completed iv/oral abx for pna. On 2.5L supplemental O2 and bipap. Has pulm appt today.   Bowels moving, but still a little sluggish. Taking stool softener BID. No gerd c/o.     Review of Systems   Constitutional: Negative for fatigue and unexpected weight change.   HENT: Negative for congestion and sore throat.    Eyes: Negative for photophobia and visual disturbance.   Respiratory: Positive for cough and shortness of breath.    Cardiovascular: Negative for palpitations and leg swelling.   Gastrointestinal: Positive for constipation. Negative for abdominal pain, blood in stool and diarrhea.   Genitourinary: Positive for frequency. Negative for difficulty urinating.   Musculoskeletal: Positive for arthralgias, gait problem and myalgias.   Neurological: Positive for dizziness. Negative for light-headedness and headaches.    Psychiatric/Behavioral: Negative for sleep disturbance (more comfortable in her recliner of late). The patient is nervous/anxious.        Objective:              Assessment:   Atherosclerosis of aorta    Closed fracture of right tibia and fibula with routine healing, subsequent encounter    Acute on chronic respiratory failure with hypoxia and hypercapnia         Patient aware of limitations to assessment and exam of telemedicine. Deemed appropriate at this time given current covid-19 concerns.

## 2022-03-02 ENCOUNTER — DOCUMENT SCAN (OUTPATIENT)
Dept: HOME HEALTH SERVICES | Facility: HOSPITAL | Age: 77
End: 2022-03-02
Payer: MEDICARE

## 2022-03-05 NOTE — TELEPHONE ENCOUNTER
No new care gaps identified.  Powered by [employer] by [facility]. Reference number: [id number].   3/05/2022 1:30:17 PM CST

## 2022-03-06 RX ORDER — ALLOPURINOL 100 MG/1
100 TABLET ORAL DAILY
Qty: 90 TABLET | Refills: 1 | Status: SHIPPED | OUTPATIENT
Start: 2022-03-06 | End: 2022-06-13

## 2022-03-07 RX ORDER — NORTRIPTYLINE HYDROCHLORIDE 25 MG/1
25 CAPSULE ORAL NIGHTLY
Qty: 90 CAPSULE | Refills: 3 | Status: SHIPPED | OUTPATIENT
Start: 2022-03-07 | End: 2023-03-09

## 2022-03-07 NOTE — TELEPHONE ENCOUNTER
No new care gaps identified.  Powered by [employer] by [employer]. Reference number: [id number].   3/07/2022 2:44:26 PM CST

## 2022-03-07 NOTE — TELEPHONE ENCOUNTER
Refill Authorization Note   Luda Peters  is requesting a refill authorization.  Brief Assessment and Rationale for Refill:  Approve     Medication Therapy Plan:       Medication Reconciliation Completed: No   Comments:   --->Care Gap information included below if applicable.   Orders Placed This Encounter    nortriptyline (PAMELOR) 25 MG capsule      Requested Prescriptions   Signed Prescriptions Disp Refills    nortriptyline (PAMELOR) 25 MG capsule 90 capsule 3     Sig: Take 1 capsule (25 mg total) by mouth every evening.       Tricyclic Antidepressants Passed - 2/28/2022  1:17 PM        Passed - Patient is not currently pregnant        Passed - No positive pregnancy test in past 12 months         Passed - Patient has a recent visit in past 12 months or next 90 days       Psychiatry:  Antidepressants - Heterocyclics (TCAs) Passed - 3/7/2022  2:43 PM        Passed - Patient is at least 18 years old        Passed - Valid encounter within last 15 months     Recent Visits  Date Type Provider Dept   02/25/22 Office Visit Nuzhat Kline MD [facility] Family Medicine   11/26/21 Office Visit Nuzhat Kline MD [facility] Family Medicine   03/18/21 Office Visit Nuzhat Kline MD [facility] Family Medicine   08/03/20 Office Visit Nuzhat Kline MD [facility] Family [facility]   04/30/20 Office Visit Nuzhat Kline MD [facility] Family [facility]   Showing recent visits within past 720 days and meeting all other requirements  Future Appointments  No visits were found meeting these conditions.  Showing future appointments within next 150 days and meeting all other requirements      Future Appointments              In 1 week Everett Corona MD [facility], [facility]    In 2 months Yunior Flores NP [facility], [facility]                    Appointments  past 12m or future 3m with PCP    Date Provider   Last Visit   2/25/2022 Nuzhat Kline MD   Next Visit   3/5/2022 Nuzhat FRAGOSO  MD Eliud   ED visits in past 90 days: 0     Note composed:3:03 PM 03/07/2022

## 2022-03-07 NOTE — TELEPHONE ENCOUNTER
Encounter details require adjustment(s)/ updating by OR Staff  As of this time Protocols and CDM: did not populate or display   Adjustment(s) made: Department  CDM should display. Medication(s) delegated by the OR.  Will resend refill request encounter to P Centralized Refill Staff Pool.   [facility]   Note composed:2:43 PM 03/07/2022

## 2022-03-11 ENCOUNTER — DOCUMENT SCAN (OUTPATIENT)
Dept: HOME HEALTH SERVICES | Facility: HOSPITAL | Age: 77
End: 2022-03-11
Payer: MEDICARE

## 2022-03-15 ENCOUNTER — DOCUMENT SCAN (OUTPATIENT)
Dept: HOME HEALTH SERVICES | Facility: HOSPITAL | Age: 77
End: 2022-03-15
Payer: MEDICARE

## 2022-04-01 ENCOUNTER — DOCUMENTATION ONLY (OUTPATIENT)
Dept: FAMILY MEDICINE | Facility: CLINIC | Age: 77
End: 2022-04-01
Payer: MEDICARE

## 2022-04-01 NOTE — PROGRESS NOTES
Called to schedule [facility] [employer] AWV - pt declined at this time due to a fracture - may call back to schedule at a later date

## 2022-04-12 ENCOUNTER — TELEPHONE (OUTPATIENT)
Dept: FAMILY MEDICINE | Facility: CLINIC | Age: 77
End: 2022-04-12
Payer: MEDICARE

## 2022-04-12 RX ORDER — HYDROCODONE BITARTRATE AND ACETAMINOPHEN 5; 325 MG/1; MG/1
1 TABLET ORAL EVERY 6 HOURS PRN
Qty: 20 TABLET | Refills: 0 | Status: SHIPPED | OUTPATIENT
Start: 2022-04-12 | End: 2022-07-08

## 2022-04-12 NOTE — TELEPHONE ENCOUNTER
----- Message from Cherelle Elmore sent at 4/12/2022  8:03 AM CDT -----  Patient's daughter, Gabrielle Zamudio, is calling to request a refill of the pain medication that was given to her mother from the hospital.  HYDROcodone-acetaminophen (NORCO) 5-325 mg per tablet.  Her pharmacy is   [id number] [place] [address] AT [address] [facility] & [address] [address]  Phone: [phone] Fax: [phone]    Please call Gabrielle at [phone].  Thank you!         Acute Care - Physical Therapy Initial Evaluation  [facility]     Patient Name: Nixon Chambers  : 1952  MRN: [id number]  Today's Date: 3/7/2017                Admit Date: 3/7/2017     Visit Dx:  No diagnosis found.  Patient Active Problem List   Diagnosis   • Primary osteoarthritis of left knee     Past Medical History   Diagnosis Date   • Cancer      MELANOMA   • Diabetes mellitus    • Hard to intubate      WITH ROTATOR CUFF SURGERY, WAS INTUBATED WHILE AWAKE X1 WITH MELANOMA SURGERY   • High cholesterol    • Hypertension    • Knee pain, left    • Sleep apnea      WEARS CPAP     Past Surgical History   Procedure Laterality Date   • Knee arthroplasty Right    • Rotator cuff repair Right    • Skin cancer excision       MID AND LOWER BACK   • Skin graft       BACK          PT ASSESSMENT (last 72 hours)      PT Evaluation       17 1503 17 1159    Rehab Evaluation    Document Type evaluation  -RD     Subjective Information no complaints;agree to therapy  -RD     General Information    Prior Level of Function independent:;community mobility  -RD     Equipment Currently Used at Home  none  -LM    Plans/Goals Discussed With patient and family  -RD     Living Environment    Lives With  spouse  -LM    Living Arrangements  house  -LM    Home Accessibility  no concerns  -LM    Stair Railings at Home  outside, present at both sides;inside, present at both sides  -LM    Type of Financial/Environmental Concern  none  -LM    Transportation Available  car  -LM    Clinical Impression    Patient/Family Goals Statement return home safely  -RD     Criteria for Skilled Therapeutic Interventions Met yes  -RD     Impairments Found (describe specific impairments) gait, locomotion, and balance;muscle performance  -RD     Rehab Potential good, to achieve stated therapy goals  -RD     Pain Assessment    Pain Assessment 0-10  -RD     Pain Score 8  -RD     Pain Intervention(s) Cold  applied;Repositioned;Ambulation/increased activity  -RD     Cognitive Assessment/Intervention    Current Cognitive/Communication Assessment functional  -RD     Orientation Status oriented x 4  -RD     Follows Commands/Answers Questions 100% of the time  -RD     Personal Safety WNL/WFL  -RD     ROM (Range of Motion)    General ROM Detail l knee -5-80  -RD     Mobility Assessment/Training    Extremity Weight-Bearing Status left lower extremity  -RD     Left Lower Extremity Weight-Bearing weight-bearing as tolerated  -RD     Bed Mobility, Assessment/Treatment    Bed Mob, Supine to Sit, Robertson minimum assist (75% patient effort)  -RD     Transfer Assessment/Treatment    Transfers, Sit-Stand Robertson contact guard assist  -RD     Transfers, Sit-Stand-Sit, Assist Device rolling walker  -RD     Gait Assessment/Treatment    Gait, Robertson Level contact guard assist  -RD     Gait, Assistive Device rolling walker  -RD     Gait, Distance (Feet) 80  -RD     Gait, Gait Deviations left:;antalgic  -RD     Therapy Exercises    Exercise Protocols total knee  -RD     Total Knee Exercises left:;10 reps  -RD     Positioning and Restraints    Pre-Treatment Position in bed  -RD     Post Treatment Position chair  -RD     In Chair reclined;call light within reach  -RD       User Key  (r) = Recorded By, (t) = Taken By, (c) = Cosigned By    Initials Name Provider Type    RD Janet Junior, LORENZO Physical Therapist    VINNIE Mendoza, RN Registered Nurse          Physical Therapy Education     Title: PT OT SLP Therapies (Done)     Topic: Physical Therapy (Done)     Point: Mobility training (Done)    Learning Progress Summary    Learner Readiness Method Response Comment Documented by Status   Patient Acceptance LORENA LAGUNA RD 03/07/17 1600 Done   Family Acceptance LORENA LAGUNA RD 03/07/17 1600 Done               Point: Home exercise program (Done)    Learning Progress Summary    Learner Readiness Method Response Comment Documented by  Status   Patient Acceptance TB,E DU  RD 03/07/17 1600 Done   Family Acceptance TB,E DU  RD 03/07/17 1600 Done               Point: Body mechanics (Done)    Learning Progress Summary    Learner Readiness Method Response Comment Documented by Status   Patient Acceptance TB,E DU  RD 03/07/17 1600 Done   Family Acceptance TB,E DU  RD 03/07/17 1600 Done               Point: Precautions (Done)    Learning Progress Summary    Learner Readiness Method Response Comment Documented by Status   Patient Acceptance TB,E DU  RD 03/07/17 1600 Done   Family Acceptance TB,E DU  RD 03/07/17 1600 Done                      User Key     Initials Effective Dates Name Provider Type Discipline    RD 10/06/15 -  Jnaet Junior, PT Physical Therapist PT                PT Recommendation and Plan  Anticipated Discharge Disposition: home with home health  Planned Therapy Interventions: gait training, home exercise program, ROM (Range of Motion), stair training, strengthening, stretching  PT Frequency: 2 times/day  Plan of Care Review  Plan Of Care Reviewed With: patient, spouse  Outcome Summary/Follow up Plan: patient doing very well with tjr protocol.  pt was indep with slr today.  ready for home as early as tomm.  will follow closely and plan for gym in am          IP PT Goals       03/07/17 1601          Gait Training PT LTG    Gait Training Goal PT LTG, Time to Achieve 5 days  -RD      Gait Training Goal PT LTG, [place] Level supervision required  -RD      Gait Training Goal PT LTG, Assist Device walker, rolling  -RD      Gait Training Goal PT LTG, Distance to Achieve 80  -RD      Range of Motion PT LTG    Range of Motion Goal PT LTG, Time to Achieve 5 days  -RD      Range fo Motion Goal PT LTG, Joint L knee  -RD      Range of Motion Goal PT LTG, AROM Measure -5-90  -RD      Patient Education PT LTG    Patient Education PT LTG, Time to Achieve 5 days  -RD      Patient Education PT LTG, Education Type HEP;written program  -RD       Patient Education PT LTG, Education Understanding demonstrate adequately;verbalize understanding  -RD        User Key  (r) = Recorded By, (t) = Taken By, (c) = Cosigned By    Initials Name Provider Type    REESE Junior, PT Physical Therapist                Outcome Measures       03/07/17 1600          How much help from another person do you currently need...    Turning from your back to your side while in flat bed without using bedrails? 4  -RD      Moving from lying on back to sitting on the side of a flat bed without bedrails? 3  -RD      Moving to and from a bed to a chair (including a wheelchair)? 3  -RD      Standing up from a chair using your arms (e.g., wheelchair, bedside chair)? 3  -RD      Climbing 3-5 steps with a railing? 3  -RD      To walk in hospital room? 3  -RD      AM-PAC 6 Clicks Score 19  -RD      Functional Assessment    Outcome Measure Options AM-PAC 6 Clicks Basic Mobility (PT)  -RD        User Key  (r) = Recorded By, (t) = Taken By, (c) = Cosigned By    Initials Name Provider Type    REESE Junior, PT Physical Therapist           Time Calculation:         PT Charges       03/07/17 1604          Time Calculation    Start Time 1510  -RD      Stop Time 1530  -RD      Time Calculation (min) 20 min  -RD      PT Received On 03/07/17  -RD      PT - Next Appointment 03/08/17  -RD      PT Goal Re-Cert Due Date 03/12/17  -RD        User Key  (r) = Recorded By, (t) = Taken By, (c) = Cosigned By    Initials Name Provider Type    REESE Junior, LORENZO Physical Therapist          Therapy Charges for Today     Code Description Service Date Service Provider Modifiers Qty    48893589394 HC PT EVAL LOW COMPLEXITY 2 3/7/2017 Janet Junior, PT GP 1    24797125707 HC PT THER PROC EA 15 MIN 3/7/2017 Janet Junior, PT GP 1          PT G-Codes  Outcome Measure Options: AM-PAC 6 Clicks Basic Mobility (PT)      Janet Junior, PT  3/7/2017

## 2022-04-13 PROCEDURE — G0179 PR HOME HEALTH MD RECERTIFICATION: ICD-10-PCS | Mod: ,,, | Performed by: FAMILY MEDICINE

## 2022-04-13 PROCEDURE — G0179 MD RECERTIFICATION HHA PT: HCPCS | Mod: ,,, | Performed by: FAMILY MEDICINE

## 2022-04-13 NOTE — TELEPHONE ENCOUNTER
Refill Routing Note   Medication(s) are not appropriate for processing by [facility] for the following reason(s):      - Outside of protocol  - Required indication for medication not on problem list (GERD)    [facility] action(s):  Defer          Medication reconciliation completed: No     Appointments  past 12m or future 3m with PCP    Date Provider   Last Visit   2/25/2022 Nuzhat Kline MD   Next Visit   Visit date not found Nuzhat Kline MD   ED visits in past 90 days: 0        Note composed:11:01 PM 04/12/2022

## 2022-04-13 NOTE — TELEPHONE ENCOUNTER
No new care gaps identified.  Powered by [employer]. Reference number: [id number].   4/12/2022 10:43:42 PM CDT

## 2022-04-14 RX ORDER — PANTOPRAZOLE SODIUM 40 MG/1
TABLET, DELAYED RELEASE ORAL
Qty: 180 TABLET | Refills: 1 | Status: SHIPPED | OUTPATIENT
Start: 2022-04-14 | End: 2022-10-14

## 2022-04-22 RX ORDER — METOPROLOL TARTRATE 25 MG/1
TABLET, FILM COATED ORAL
Qty: 180 TABLET | Refills: 3 | Status: SHIPPED | OUTPATIENT
Start: 2022-04-22 | End: 2023-04-19

## 2022-04-22 NOTE — TELEPHONE ENCOUNTER
No new care gaps identified.  Powered by [employer] by [employer]. Reference number: [id number].   4/22/2022 4:42:07 PM CDT

## 2022-04-22 NOTE — TELEPHONE ENCOUNTER
Refill Authorization Note   Luda Peters  is requesting a refill authorization.  Brief Assessment and Rationale for Refill:  Approve     Medication Therapy Plan:       Medication Reconciliation Completed: No   Comments:     No Care Gaps recommended.     Note composed:4:44 PM 04/22/2022

## 2022-04-26 ENCOUNTER — EXTERNAL HOME HEALTH (OUTPATIENT)
Dept: HOME HEALTH SERVICES | Facility: HOSPITAL | Age: 77
End: 2022-04-26
Payer: MEDICARE

## 2022-04-26 NOTE — TELEPHONE ENCOUNTER
Refill Routing Note   Medication(s) are not appropriate for processing by [facility] for the following reason(s):      - Medication not active on medication list    [facility] action(s):  Defer Medication-related problems identified: Requires labs        Medication reconciliation completed: No     Appointments  past 12m or future 3m with PCP    Date Provider   Last Visit   2/25/2022 Nuzhat Kline MD   Next Visit   Visit date not found Nuzhat Kline MD   ED visits in past 90 days: 0        Note composed:5:57 PM 04/26/2022

## 2022-04-26 NOTE — TELEPHONE ENCOUNTER
Care Due:                  Date            Visit Type   Department     Provider  --------------------------------------------------------------------------------                                [facility]  Last Visit: 02-      FOLLOW UP    MEDICINE       Nuzhat Kline  Next Visit: None Scheduled  None         None Found                                                            Last  Test          Frequency    Reason                     Performed    Due Date  --------------------------------------------------------------------------------    Uric Acid...  12 months..  allopurinoL..............  05- 05-    Powered by [employer]. Reference number: [id number].   4/25/2022 9:32:30 PM CDT

## 2022-04-28 ENCOUNTER — TELEPHONE (OUTPATIENT)
Dept: FAMILY MEDICINE | Facility: CLINIC | Age: 77
End: 2022-04-28

## 2022-04-28 ENCOUNTER — DOCUMENT SCAN (OUTPATIENT)
Dept: HOME HEALTH SERVICES | Facility: HOSPITAL | Age: 77
End: 2022-04-28
Payer: MEDICARE

## 2022-04-28 RX ORDER — FUROSEMIDE 40 MG/1
TABLET ORAL
Qty: 180 TABLET | Refills: 1 | Status: SHIPPED | OUTPATIENT
Start: 2022-04-28 | End: 2022-10-18

## 2022-04-28 NOTE — TELEPHONE ENCOUNTER
----- Message from Jerman Austin sent at 4/28/2022  4:06 PM CDT -----  Regarding: Call Back  Who Called:  Gabrielle- Daughter         What is the reason for the call: calling in regards to patients furosemide (LASIX) 40 MG tablet getting filled. States got a message from pharmacy that medication wasn't filled and waiting on approval. States been since Tuesday evening and put into system to be refilled over 5 days ago. Please contact.          Can patient be contacted on [employer]: No          Call back number:  [phone]

## 2022-04-28 NOTE — TELEPHONE ENCOUNTER
----- Message from Beth Lopez sent at 4/28/2022  7:39 AM CDT -----  Type:  RX Refill Request    Who Called:  Pt daughter  Refill or New Rx:  Refill  RX Name and Strength:  Furosemide 40 mgs  How is the patient currently taking it? (ex. 1XDay):  1  Is this a 30 day or 90 day RX:  90  Preferred Pharmacy with phone number:   [facility] #[id number] [address] [facility] [address] [facility] [address];  Local or Mail Order:  Local  Ordering Provider:  Dr. Eliud Vrea Call Back Number:  [phone]  Additional Information:  Please call and refill

## 2022-05-06 ENCOUNTER — DOCUMENT SCAN (OUTPATIENT)
Dept: HOME HEALTH SERVICES | Facility: HOSPITAL | Age: 77
End: 2022-05-06
Payer: MEDICARE

## 2022-05-27 ENCOUNTER — DOCUMENT SCAN (OUTPATIENT)
Dept: HOME HEALTH SERVICES | Facility: HOSPITAL | Age: 77
End: 2022-05-27
Payer: MEDICARE

## 2022-05-28 DIAGNOSIS — M25.561 ACUTE PAIN OF RIGHT KNEE: ICD-10-CM

## 2022-06-03 RX ORDER — DICLOFENAC SODIUM 10 MG/G
GEL TOPICAL
Qty: 100 G | Refills: 1 | Status: SHIPPED | OUTPATIENT
Start: 2022-06-03 | End: 2022-07-08 | Stop reason: SDUPTHER

## 2022-06-25 RX ORDER — ESCITALOPRAM OXALATE 20 MG/1
TABLET ORAL
Qty: 90 TABLET | Refills: 2 | Status: SHIPPED | OUTPATIENT
Start: 2022-06-25 | End: 2023-05-05 | Stop reason: SDUPTHER

## 2022-06-25 NOTE — TELEPHONE ENCOUNTER
No new care gaps identified.  [facility] Embedded Care Gaps. Reference number: [id number]. 6/25/2022   11:31:30 AM CDT

## 2022-06-25 NOTE — TELEPHONE ENCOUNTER
Refill Decision Note   Luda Peters  is requesting a refill authorization.  Brief Assessment and Rationale for Refill:  Approve     Medication Therapy Plan:       Medication Reconciliation Completed: No   Comments:     No Care Gaps recommended.     Note composed:11:39 AM 06/25/2022

## 2022-07-01 ENCOUNTER — DOCUMENT SCAN (OUTPATIENT)
Dept: HOME HEALTH SERVICES | Facility: HOSPITAL | Age: 77
End: 2022-07-01
Payer: MEDICARE

## 2022-07-05 ENCOUNTER — TELEPHONE (OUTPATIENT)
Dept: FAMILY MEDICINE | Facility: CLINIC | Age: 77
End: 2022-07-05
Payer: MEDICARE

## 2022-07-05 DIAGNOSIS — U07.1 COVID-19: ICD-10-CM

## 2022-07-05 DIAGNOSIS — J96.22 ACUTE ON CHRONIC RESPIRATORY FAILURE WITH HYPOXIA AND HYPERCAPNIA: Primary | ICD-10-CM

## 2022-07-05 DIAGNOSIS — U07.1 COVID: ICD-10-CM

## 2022-07-05 DIAGNOSIS — J96.21 ACUTE ON CHRONIC RESPIRATORY FAILURE WITH HYPOXIA AND HYPERCAPNIA: Primary | ICD-10-CM

## 2022-07-05 NOTE — TELEPHONE ENCOUNTER
----- Message from Constanza Chau sent at 7/5/2022  8:05 AM CDT -----  Contact: Gabrielle  Who Called: PT  Regarding: The pt tested positive for COVID yesterday 7/4/22 and the daughter is requesting to have something called into the pharmacy for cough and to help with the virus. She is requesting a callback.   Would the patient rather a call back or a response via [facility]? Call back  Best Call Back Number: [phone]  Additional Information:

## 2022-07-06 ENCOUNTER — TELEPHONE (OUTPATIENT)
Dept: FAMILY MEDICINE | Facility: CLINIC | Age: 77
End: 2022-07-06
Payer: MEDICARE

## 2022-07-06 NOTE — TELEPHONE ENCOUNTER
----- Message from Edwina Almodovar sent at 7/6/2022  8:29 AM CDT -----  Who Called: Daughter    What is the reqeust in detail: Requesting update on her previous inquiry about something being called in for her mother's covid symptoms. Please advise.     Can the clinic reply by [facility]? Yes    Best Call Back Number: [phone]    Additional Information:

## 2022-07-07 PROBLEM — U07.1 COVID: Status: ACTIVE | Noted: 2022-07-07

## 2022-07-07 NOTE — TELEPHONE ENCOUNTER
eua ordered. Infusion center should be in touch to schedule.  There is an oral medication, but it interacts with plavix and may decrease its efficacy.

## 2022-07-08 ENCOUNTER — OFFICE VISIT (OUTPATIENT)
Dept: FAMILY MEDICINE | Facility: CLINIC | Age: 77
End: 2022-07-08
Payer: MEDICARE

## 2022-07-08 DIAGNOSIS — B96.89 ACUTE BACTERIAL BRONCHITIS: ICD-10-CM

## 2022-07-08 DIAGNOSIS — M25.561 ACUTE PAIN OF RIGHT KNEE: ICD-10-CM

## 2022-07-08 DIAGNOSIS — J43.2 CENTRILOBULAR EMPHYSEMA: ICD-10-CM

## 2022-07-08 DIAGNOSIS — J20.8 ACUTE BACTERIAL BRONCHITIS: ICD-10-CM

## 2022-07-08 DIAGNOSIS — U07.1 COVID-19: Primary | ICD-10-CM

## 2022-07-08 PROCEDURE — 99213 PR OFFICE/OUTPT VISIT, EST, LEVL III, 20-29 MIN: ICD-10-PCS | Mod: 95,,, | Performed by: FAMILY MEDICINE

## 2022-07-08 PROCEDURE — 99213 OFFICE O/P EST LOW 20 MIN: CPT | Mod: 95,,, | Performed by: FAMILY MEDICINE

## 2022-07-08 PROCEDURE — 99499 RISK ADDL DX/OHS AUDIT: ICD-10-PCS | Mod: 95,,, | Performed by: FAMILY MEDICINE

## 2022-07-08 PROCEDURE — 99499 UNLISTED E&M SERVICE: CPT | Mod: 95,,, | Performed by: FAMILY MEDICINE

## 2022-07-08 PROCEDURE — 1159F PR MEDICATION LIST DOCUMENTED IN MEDICAL RECORD: ICD-10-PCS | Mod: CPTII,95,, | Performed by: FAMILY MEDICINE

## 2022-07-08 PROCEDURE — 1159F MED LIST DOCD IN RCRD: CPT | Mod: CPTII,95,, | Performed by: FAMILY MEDICINE

## 2022-07-08 PROCEDURE — 1160F PR REVIEW ALL MEDS BY PRESCRIBER/CLIN PHARMACIST DOCUMENTED: ICD-10-PCS | Mod: CPTII,95,, | Performed by: FAMILY MEDICINE

## 2022-07-08 PROCEDURE — 1160F RVW MEDS BY RX/DR IN RCRD: CPT | Mod: CPTII,95,, | Performed by: FAMILY MEDICINE

## 2022-07-08 RX ORDER — METHYLPREDNISOLONE 4 MG/1
TABLET ORAL
Qty: 21 EACH | Refills: 0 | Status: SHIPPED | OUTPATIENT
Start: 2022-07-08 | End: 2022-07-29

## 2022-07-08 RX ORDER — IPRATROPIUM BROMIDE AND ALBUTEROL SULFATE 2.5; .5 MG/3ML; MG/3ML
3 SOLUTION RESPIRATORY (INHALATION) EVERY 6 HOURS PRN
Qty: 75 ML | Refills: 5 | Status: SHIPPED | OUTPATIENT
Start: 2022-07-08 | End: 2023-11-03 | Stop reason: SDUPTHER

## 2022-07-08 RX ORDER — DICLOFENAC SODIUM 10 MG/G
GEL TOPICAL
Qty: 100 G | Refills: 1 | Status: SHIPPED | OUTPATIENT
Start: 2022-07-08 | End: 2022-12-23 | Stop reason: SDUPTHER

## 2022-07-08 RX ORDER — BUDESONIDE 0.5 MG/2ML
0.5 INHALANT ORAL 2 TIMES DAILY PRN
Qty: 100 ML | Refills: 2 | Status: SHIPPED | OUTPATIENT
Start: 2022-07-08 | End: 2023-05-05 | Stop reason: SDUPTHER

## 2022-07-08 RX ORDER — AZITHROMYCIN 250 MG/1
TABLET, FILM COATED ORAL
Qty: 6 TABLET | Refills: 0 | Status: SHIPPED | OUTPATIENT
Start: 2022-07-08 | End: 2022-07-13

## 2022-07-08 NOTE — PROGRESS NOTES
Subjective:       Patient ID: Luda Peters is a 77 y.o. female.    Chief Complaint: COVID-19 Concerns      The patient location is: home, [place]  The chief complaint leading to consultation is: covid 19  Visit type: Virtual visit with synchronous audio and video  Total time spent with patient: 20mins  Each patient to whom he or she provides medical services by telemedicine is:  (1) informed of the relationship between the physician and patient and the respective role of any other health care provider with respect to management of the patient; and (2) notified that he or she may decline to receive medical services by telemedicine and may withdraw from such care at any time.    Notes:   +covid  Had issues with her bipap - daughter was able to correct seal issues.   Started (old rx) budesonide x 2 doses.    Afebrile throughout. Oxygen levels are doing ok.   Discussed eua option - paxlovid not recommended due to interaction with plavix.     Review of Systems   Constitutional: Positive for activity change. Negative for fever and unexpected weight change.   HENT: Positive for rhinorrhea and trouble swallowing. Negative for hearing loss.    Eyes: Negative for discharge and visual disturbance.   Respiratory: Positive for cough, shortness of breath and wheezing. Negative for chest tightness.    Cardiovascular: Negative for chest pain and palpitations.   Gastrointestinal: Positive for constipation. Negative for blood in stool, diarrhea and vomiting.   Endocrine: Negative for polydipsia and polyuria.   Genitourinary: Negative for difficulty urinating, dysuria, hematuria and menstrual problem.   Musculoskeletal: Positive for arthralgias and joint swelling. Negative for neck pain.   Neurological: Positive for weakness and headaches.   Psychiatric/Behavioral: Negative for confusion and dysphoric mood.       Objective:        Physical Exam  Vitals and nursing note reviewed.   Constitutional:       General: She is not in acute  distress.     Appearance: She is well-developed.   HENT:      Head: Normocephalic and atraumatic.   Eyes:      General: No scleral icterus.        Right eye: No discharge.         Left eye: No discharge.      Conjunctiva/sclera: Conjunctivae normal.   Pulmonary:      Effort: Pulmonary effort is normal. No respiratory distress.      Comments: Deep, productive cough observed  Skin:     General: Skin is warm and dry.   Neurological:      General: No focal deficit present.      Mental Status: She is alert and oriented to person, place, and time.   Psychiatric:         Mood and Affect: Mood normal.         Behavior: Behavior normal.           Assessment:   COVID-19  -     COVID-19 Home Symptom Monitoring  - Duration (days): 14    Acute pain of right knee  -     diclofenac sodium (VOLTAREN) 1 % Gel; APPLY 2 GRAMS TOPICALLY TO THE AFFECTED AREA TWICE DAILY  Dispense: 100 g; Refill: 1    Centrilobular emphysema  -     albuterol-ipratropium (DUO-NEB) 2.5 mg-0.5 mg/3 mL nebulizer solution; Take 3 mLs by nebulization every 6 (six) hours as needed for Wheezing. Rescue  Dispense: 75 mL; Refill: 5  -     budesonide (PULMICORT) 0.5 mg/2 mL nebulizer solution; Take 2 mLs (0.5 mg total) by nebulization 2 (two) times daily as needed (wheezing). USE 1 VIAL VIA NEBULIZER EVERY 12 HOURS  Dispense: 100 mL; Refill: 2    Acute bacterial bronchitis  -     azithromycin (Z-SHIMON) 250 MG tablet; Take 2 tablets by mouth on day 1; Take 1 tablet by mouth on days 2-5  Dispense: 6 tablet; Refill: 0  -     methylPREDNISolone (MEDROL DOSEPACK) 4 mg tablet; use as directed  Dispense: 21 each; Refill: 0    ok to continue mucinex.  Zpak/medrol if change to color of sputum, resp sx.  Cont controller med, inahled steroid, and duonebs.  Discussed paxlovid interaction with plavix vs eua.    Patient aware of limitations to assessment and exam of telemedicine. Deemed appropriate at this time given current covid-19 concerns.

## 2022-07-15 NOTE — TELEPHONE ENCOUNTER
No new care gaps identified.  [facility] Embedded Care Gaps. Reference number: [id number]. 7/15/2022   3:54:20 PM CDT

## 2022-07-18 RX ORDER — GABAPENTIN 600 MG/1
TABLET ORAL
Qty: 180 TABLET | Refills: 1 | Status: SHIPPED | OUTPATIENT
Start: 2022-07-18 | End: 2023-11-07 | Stop reason: SDUPTHER

## 2022-07-29 PROBLEM — R15.9 FECAL INCONTINENCE: Status: ACTIVE | Noted: 2022-07-29

## 2022-07-29 PROBLEM — R32 URINARY INCONTINENCE: Status: ACTIVE | Noted: 2022-07-29

## 2022-08-09 RX ORDER — CLOPIDOGREL BISULFATE 75 MG/1
TABLET ORAL
Qty: 90 TABLET | Refills: 1 | Status: SHIPPED | OUTPATIENT
Start: 2022-08-09 | End: 2023-01-27

## 2022-08-09 NOTE — TELEPHONE ENCOUNTER
No new care gaps identified.  [facility] Embedded Care Gaps. Reference number: [id number]. 8/09/2022   1:28:57 PM CDT

## 2022-08-10 NOTE — TELEPHONE ENCOUNTER
Refill Authorization Note     Refill Decision Note   Luda Peters  is requesting a refill authorization.  Brief Assessment and Rationale for Refill:  Approve     Medication Therapy Plan:       Medication Reconciliation Completed: No   Comments:     No Care Gaps recommended.     Note composed:7:08 PM 08/09/2022

## 2022-08-31 DIAGNOSIS — Z78.0 MENOPAUSE: ICD-10-CM

## 2022-10-07 PROBLEM — G47.33 OSA TREATED WITH BIPAP: Status: ACTIVE | Noted: 2022-10-07

## 2022-11-25 ENCOUNTER — HOSPITAL ENCOUNTER (OUTPATIENT)
Dept: RADIOLOGY | Facility: HOSPITAL | Age: 77
Discharge: HOME | End: 2022-11-25
Attending: FAMILY MEDICINE
Payer: MEDICARE

## 2022-11-25 DIAGNOSIS — Z78.0 MENOPAUSE: ICD-10-CM

## 2022-11-25 PROCEDURE — 77080 DEXA BONE DENSITY SPINE HIP: ICD-10-PCS | Mod: 26,,, | Performed by: RADIOLOGY

## 2022-11-25 PROCEDURE — 77080 DXA BONE DENSITY AXIAL: CPT | Mod: TC,PO

## 2022-11-25 PROCEDURE — 77080 DXA BONE DENSITY AXIAL: CPT | Mod: 26,,, | Performed by: RADIOLOGY

## 2022-12-23 DIAGNOSIS — M25.561 ACUTE PAIN OF RIGHT KNEE: ICD-10-CM

## 2022-12-23 RX ORDER — DICLOFENAC SODIUM 10 MG/G
GEL TOPICAL
Qty: 100 G | Refills: 1 | Status: SHIPPED | OUTPATIENT
Start: 2022-12-23 | End: 2023-02-02

## 2022-12-23 NOTE — TELEPHONE ENCOUNTER
----- Message from Serene Gamez sent at 12/23/2022  7:03 AM CST -----  Contact: daughter  Type:  RX Refill Request    Who Called:  Gabrielle Zamudio, daughter  Refill or New Rx:  refill  RX Name and Strength:  diclofenac sodium (VOLTAREN) 1 % Gel  How is the patient currently taking it? (ex. 1XDay):  as directed  Is this a 30 day or 90 day RX:  30  Preferred Pharmacy with phone number:    [facility] #[id number] [address] [address]  Phone: [phone] Fax: [phone]  Local or Mail Order:  local  Ordering Provider:  Eliud Vera Call Back Number:  [phone] (home)   Additional Information:  patient is in need of this medication

## 2023-04-05 ENCOUNTER — TELEPHONE (OUTPATIENT)
Dept: FAMILY MEDICINE | Facility: CLINIC | Age: 78
End: 2023-04-05
Payer: MEDICARE

## 2023-04-05 NOTE — TELEPHONE ENCOUNTER
----- Message from Odin Liu sent at 4/5/2023  7:50 AM CDT -----  Type: Needs Medical Advice  Who Called:  Pt daughter Gabrielle  Best Call Back Number: [phone]  Additional Information: Pt has a canker sore in her mouth and daughter is request RX to help with that, please call back to advise, Thanks

## 2023-04-11 NOTE — TELEPHONE ENCOUNTER
Spoke with pt's daughter. Notified her Dr. Kline recommended Orajel or canker patches OTC. Per daughter she has started the otc and at this time slowly improved, but not completely gone. Notified daughter to contact clinic if s/s worsen or not improved after OTC tried. Verbalized understanding.

## 2023-04-24 ENCOUNTER — OFFICE VISIT (OUTPATIENT)
Dept: PAIN MEDICINE | Facility: CLINIC | Age: 78
End: 2023-04-24
Payer: MEDICARE

## 2023-04-24 ENCOUNTER — HOSPITAL ENCOUNTER (OUTPATIENT)
Dept: RADIOLOGY | Facility: HOSPITAL | Age: 78
Discharge: HOME | End: 2023-04-24
Payer: MEDICARE

## 2023-04-24 VITALS
WEIGHT: 175.69 LBS | HEART RATE: 78 BPM | SYSTOLIC BLOOD PRESSURE: 131 MMHG | BODY MASS INDEX: 32.33 KG/M2 | HEIGHT: 62 IN | DIASTOLIC BLOOD PRESSURE: 61 MMHG

## 2023-04-24 DIAGNOSIS — M54.16 LUMBAR RADICULOPATHY, CHRONIC: ICD-10-CM

## 2023-04-24 DIAGNOSIS — M16.11 PRIMARY OSTEOARTHRITIS OF RIGHT HIP: ICD-10-CM

## 2023-04-24 DIAGNOSIS — M54.16 LUMBAR RADICULOPATHY: Primary | ICD-10-CM

## 2023-04-24 DIAGNOSIS — M54.9 DORSALGIA, UNSPECIFIED: ICD-10-CM

## 2023-04-24 PROCEDURE — 3075F SYST BP GE 130 - 139MM HG: CPT | Mod: CPTII,S$GLB,,

## 2023-04-24 PROCEDURE — 1159F MED LIST DOCD IN RCRD: CPT | Mod: CPTII,S$GLB,,

## 2023-04-24 PROCEDURE — 72110 X-RAY EXAM L-2 SPINE 4/>VWS: CPT | Mod: 26,,, | Performed by: RADIOLOGY

## 2023-04-24 PROCEDURE — 99214 OFFICE O/P EST MOD 30 MIN: CPT | Mod: S$GLB,,,

## 2023-04-24 PROCEDURE — 1159F PR MEDICATION LIST DOCUMENTED IN MEDICAL RECORD: ICD-10-PCS | Mod: CPTII,S$GLB,,

## 2023-04-24 PROCEDURE — 99999 PR PBB SHADOW E&M-EST. PATIENT-LVL IV: CPT | Mod: PBBFAC,,,

## 2023-04-24 PROCEDURE — 1125F AMNT PAIN NOTED PAIN PRSNT: CPT | Mod: CPTII,S$GLB,,

## 2023-04-24 PROCEDURE — 99999 PR PBB SHADOW E&M-EST. PATIENT-LVL IV: ICD-10-PCS | Mod: PBBFAC,,,

## 2023-04-24 PROCEDURE — 1100F PTFALLS ASSESS-DOCD GE2>/YR: CPT | Mod: CPTII,S$GLB,,

## 2023-04-24 PROCEDURE — 3075F PR MOST RECENT SYSTOLIC BLOOD PRESS GE 130-139MM HG: ICD-10-PCS | Mod: CPTII,S$GLB,,

## 2023-04-24 PROCEDURE — 3288F PR FALLS RISK ASSESSMENT DOCUMENTED: ICD-10-PCS | Mod: CPTII,S$GLB,,

## 2023-04-24 PROCEDURE — 99214 PR OFFICE/OUTPT VISIT, EST, LEVL IV, 30-39 MIN: ICD-10-PCS | Mod: S$GLB,,,

## 2023-04-24 PROCEDURE — 3078F DIAST BP <80 MM HG: CPT | Mod: CPTII,S$GLB,,

## 2023-04-24 PROCEDURE — 72110 XR LUMBAR SPINE COMPLETE 5 VIEW: ICD-10-PCS | Mod: 26,,, | Performed by: RADIOLOGY

## 2023-04-24 PROCEDURE — 1125F PR PAIN SEVERITY QUANTIFIED, PAIN PRESENT: ICD-10-PCS | Mod: CPTII,S$GLB,,

## 2023-04-24 PROCEDURE — 3288F FALL RISK ASSESSMENT DOCD: CPT | Mod: CPTII,S$GLB,,

## 2023-04-24 PROCEDURE — 72110 X-RAY EXAM L-2 SPINE 4/>VWS: CPT | Mod: TC,PO

## 2023-04-24 PROCEDURE — 1100F PR PT FALLS ASSESS DOC 2+ FALLS/FALL W/INJURY/YR: ICD-10-PCS | Mod: CPTII,S$GLB,,

## 2023-04-24 PROCEDURE — 3078F PR MOST RECENT DIASTOLIC BLOOD PRESSURE < 80 MM HG: ICD-10-PCS | Mod: CPTII,S$GLB,,

## 2023-04-24 NOTE — PROGRESS NOTES
This note was completed with dictation software and grammatical errors may exist.    CC: Right leg pain     HPI: The patient is a 76-year-old woman with a history of COPD, multiple lumbar compression fractures, anxiety who presents in referral from Dr. Kline for right leg pain.  She returns for follow-up with worsening right-sided lower back pain with radiation into her right hip, right buttock and down right leg into her foot.  She rates his pain as a 10/10, constant, worsened with any physical activities somewhat alleviated with rest.  She continues to ambulate with a Rollator do the severity of the pain.  She has been maintaining some at-home PT directed exercises without significant relief.  She continues to take Tylenol and NSAIDs without significant relief.  She denies any associated numbness, alyce weakness or any new changes to her bowel or bladder function.      Previous history: The patient states that she has had back pain and right leg pain for about 2 years but seems to be worsening recently.  She is a poor historian, her daughter is present with her today to help with the history.  She seems to state that the pain is in the right thigh, right groin, right buttock but also reports pain radiating all the way down the leg to the foot.  It is worse with walking, worse in the morning, worse at night and worse with getting out of a bed or a chair.  She does get some relief with sitting down, rest and medications.  She is taking Advil and Tylenol with mild relief.  She denies any alyce weakness, no bowel or bladder incontinence.      Pain intervention history:  She is status post right L4/5 transforaminal injection on 08/17/2018.    Spine surgeries:    Antineuropathics:  NSAIDs:  Tylenol, Naprosyn  Physical therapy:  Has done physical therapy in the past with no benefit  Antidepressants:  Muscle relaxers:  Opioids:  Tramadol  Antiplatelets/Anticoagulants:        ROS: She reports fatigability, skin color  change, vision change, hoarse voice, chest pain, wheezing and cough, constipation, easy bruising, joint stiffness, joint swelling, back pain, memory loss, difficulty sleeping, anxiety and loss of balance.  Balance of review of systems is negative.    Past Medical History:   Diagnosis Date    Anticoagulant long-term use     Anxiety 2016    Back pain     Closed fracture of second lumbar vertebra with routine healing 2015    COPD (chronic obstructive pulmonary disease) 2016    Emphysema of lung     Encounter for blood transfusion     Gastric ulcer     Hypertension        Past Surgical History:   Procedure Laterality Date    ESOPHAGOGASTRODUODENOSCOPY N/A 3/22/2019    Procedure: EGD (ESOPHAGOGASTRODUODENOSCOPY);  Surgeon: Nomi Fajardo Jr., MD;  Location: [facility];  Service: Endoscopy;  Laterality: N/A;    HYSTERECTOMY      TRANSFORAMINAL EPIDURAL INJECTION OF STEROID Right 2021    Procedure: Injection,steroid,epidural,transforaminal approach L4/5;  Surgeon: Casey Mccall MD;  Location: [facility];  Service: Pain Management;  Laterality: Right;       Social History     Socioeconomic History    Marital status:    Tobacco Use    Smoking status: Former     Packs/day: 1.00     Types: Cigarettes     Quit date: 10/26/2014     Years since quittin.4    Smokeless tobacco: Never   Substance and Sexual Activity    Alcohol use: Yes     Alcohol/week: 0.0 standard drinks     Comment: Social    Drug use: No   Social History Narrative    Originally from [place].     Social Determinants of Health     Financial Resource Strain: Unknown    Difficulty of Paying Living Expenses: Patient refused   Food Insecurity: Unknown    Worried About Running Out of Food in the Last Year: Patient refused    Ran Out of Food in the Last Year: Patient refused   Transportation Needs: No Transportation Needs    Lack of Transportation (Medical): No    Lack of Transportation (Non-Medical): No   Physical Activity:  "Insufficiently Active    Days of Exercise per Week: 4 days    Minutes of Exercise per Session: 10 min   Stress: Stress Concern Present    Feeling of Stress : Rather much   Social Connections: Unknown    Frequency of Communication with Friends and Family: More than three times a week    Frequency of Social Gatherings with Friends and Family: More than three times a week    Active Member of Clubs or Organizations: Patient refused    Attends Club or Organization Meetings: Patient refused    Marital Status:    Housing Stability: Unknown    Unable to Pay for Housing in the Last Year: Patient refused    Number of Places Lived in the Last Year: 1    Unstable Housing in the Last Year: No         Medications/Allergies: See med card    Vitals:    04/24/23 0835   BP: 131/61   Pulse: 78   Weight: 79.7 kg (175 lb 11.3 oz)   Height: 5' 2" (1.575 m)   PainSc: 10-Worst pain ever   PainLoc: Buttocks         Physical exam:  Gen: A and O x3, pleasant, well-groomed  Skin: No rashes or obvious lesions  HEENT: PERRLA, no obvious deformities on ears or in canals. Trachea midline.  CVS: Regular rate and rhythm, normal palpable pulses.  Resp:No increased work of breathing, symmetrical chest rise.  Abdomen: Soft, NT/ND.    Musculoskeletal: Antalgic gait secondary to right leg pain, uses rolling walker pain.     Neuro  Lower extremities: 5/5 strength bilaterally, some continued weakness with right hip flexion 4/5 likely pain related  Reflexes: Patellar 3+, Achilles 2+ bilaterally.  Sensory:  Intact and symmetrical to light touch and pinprick in L2-S1 dermatomes bilaterally.    Lumbar spine:  Lumbar spine: Range of motion is moderately reduced with both flexion and extension with increased pain in the low back and right buttock with oblique extension.  Jona's test causes no increased pain on either side.    Supine straight leg raise is negative bilaterally.    Internal and external rotation of the hip causes severe right hip, buttock " and groin pain.    Myofascial exam:  Mild tenderness to palpation across lumbar paraspinous muscles.    Imaging:  10/13/16 MRI L-spine  1.  Healing relatively severe superior endplate compression fracture of the L2 vertebral body with retropulsion of the posterior vertebral body cortex.  2.  L1-2 severe spinal stenosis from a combination of the L2 vertebral body compression fracture, mild disc bulge, and degenerative facet arthrosis.  3.  L2-3 minimal disc bulge and mild degenerative facet arthrosis.  4.  L3-4 minimal disc bulge.  There is mild anterior L3 subluxation due to degenerative facet arthrosis.  5.  L4-5 moderate spinal stenosis due to degenerative facet arthrosis.  6.  L5-S1 degenerative disc disease and degenerative facet arthrosis.  There is a cystic nodule anterior to the left L5-S1 facet joint which is most likely a synovial cyst and less likely a meningeal cyst.  The cystic nodule impinges the left-sided nerve roots within the left lateral recess of S1.  7.  Old moderate superior endplate compression fracture of the T11 vertebral body.    9/26/16 Xray L-spine  There is lower thoracic and upper lumbar scoliosis convex to the right.  The scoliotic angle measures 5 degrees from the superior endplate of T12 to the inferior endplate of L4.  There is a superior endplate compression fracture of the T11 vertebral body resulting in a wedge deformity and approximately 30 % loss of vertebral body height.  There is a relatively severe superior endplate compression fracture of the L2 vertebral body resulting in a wedge deformity and approximately 70 % loss of vertebral body height.  There is a superior endplate compression fracture of the L4 vertebral body resulting in a wedge deformity and approximately 30 % loss of vertebral body height.  There is degenerative facet arthrosis at the L5-S1 level resulting in grade 2 anterolisthesis of L5 over distance of approximately 8 mm.  There is degenerative disc disease at  L5-S1 level with disc space narrowing.    3/18/21 Xray L-spine:  An anterolisthesis is noted of 1.5 cm of the L5 on S1 vertebral body.  Compression fracture is noted of the L2 vertebral body with 75% vertebral body height loss.  Compression fracture is noted at the L4 vertebral body with approximately 50% vertebral body height loss.  Atherosclerotic calcifications are noted within the aorta with aneurysmal dilatation suspected distally to approximately 3.5 cm.  This can be confirmed on the CT of 03/10/2020.  A mild dextrocurvature is noted to the thoracolumbar junction.    Assessment:  The patient is a 76-year-old woman with a history of COPD, multiple lumbar compression fractures, anxiety who presents in referral from Dr. Kline for right leg pain.     1. Lumbar radiculopathy        2. Dorsalgia, unspecified  MRI Lumbar Spine Without Contrast    X-Ray Lumbar Spine 5 View      3. Lumbar radiculopathy, chronic  MRI Lumbar Spine Without Contrast      4. Primary osteoarthritis of right hip                Plan:  1. I believe she is having worsening right-sided radicular pain.  2. I think she would benefit from a repeat epidural however prior to that I would like to update her lumbar MRI.  3. Barring any significant or major changes to her updated MRI will schedule her for a repeat epidural.  She previously found relief with right-sided L4/5 transforaminal LIN.  4. She does have a history of right hip pain as well.  If she fails to get relief with transforaminal LIN can potentially consider intra-articular right hip injection however I believe majority of her pain is originating from her lumbar spine.    The total time spent for evaluation and management on 04/24/2023 including reviewing separately obtained history, performing a medically appropriate exam and evaluation, documenting clinical information in the health record, independently interpreting results and communicating them to the patient/family/caregiver, and  ordering medications/tests/procedures was between 30-39 minutes.

## 2023-04-26 ENCOUNTER — NURSE TRIAGE (OUTPATIENT)
Dept: ADMINISTRATIVE | Facility: CLINIC | Age: 78
End: 2023-04-26
Payer: MEDICARE

## 2023-04-26 NOTE — TELEPHONE ENCOUNTER
Refill Routing Note   Medication(s) are not appropriate for processing by [facility] for the following reason(s):      Required labs outdated    [facility] action(s):  Defer            Appointments  past 12m or future 3m with PCP    Date Provider   Last Visit   7/8/2022 Nuzhat Kline MD   Next Visit   5/5/2023 Nuzhat Kline MD   ED visits in past 90 days: 0        Note composed:12:56 PM 04/26/2023

## 2023-04-26 NOTE — TELEPHONE ENCOUNTER
Patient transferred over to tirage nurse from appt desk and nurse was told by  patient was calling on behalf of medication. Patient disconnected from line before transferred. Phone number given by appt desk was for daughter who is not with the patient and was unaware of why the patient would be calling. Advised to have patient call back with further questions.      Reason for Disposition   Wrong number reached.  Phone number verified.    Protocols used: No Contact or Duplicate Contact Call-A-AH

## 2023-04-26 NOTE — TELEPHONE ENCOUNTER
No new care gaps identified.  Health system Embedded Care Gaps. Reference number: [id number]. 4/26/2023   12:42:15 PM CDT

## 2023-04-27 RX ORDER — TRAMADOL HYDROCHLORIDE 50 MG/1
50 TABLET ORAL EVERY 8 HOURS PRN
Qty: 21 TABLET | Refills: 0 | Status: SHIPPED | OUTPATIENT
Start: 2023-04-27 | End: 2023-05-04

## 2023-04-28 ENCOUNTER — PATIENT MESSAGE (OUTPATIENT)
Dept: PAIN MEDICINE | Facility: CLINIC | Age: 78
End: 2023-04-28
Payer: MEDICARE

## 2023-05-01 RX ORDER — CLOPIDOGREL BISULFATE 75 MG/1
TABLET ORAL
Qty: 90 TABLET | Refills: 1 | Status: SHIPPED | OUTPATIENT
Start: 2023-05-01 | End: 2023-08-07

## 2023-05-05 ENCOUNTER — OFFICE VISIT (OUTPATIENT)
Dept: FAMILY MEDICINE | Facility: CLINIC | Age: 78
End: 2023-05-05
Payer: MEDICARE

## 2023-05-05 ENCOUNTER — LAB VISIT (OUTPATIENT)
Dept: LAB | Facility: HOSPITAL | Age: 78
End: 2023-05-05
Attending: FAMILY MEDICINE
Payer: MEDICARE

## 2023-05-05 VITALS
HEIGHT: 62 IN | BODY MASS INDEX: 32.11 KG/M2 | RESPIRATION RATE: 14 BRPM | WEIGHT: 174.5 LBS | DIASTOLIC BLOOD PRESSURE: 58 MMHG | HEART RATE: 68 BPM | SYSTOLIC BLOOD PRESSURE: 100 MMHG

## 2023-05-05 DIAGNOSIS — I70.0 ATHEROSCLEROSIS OF AORTA: ICD-10-CM

## 2023-05-05 DIAGNOSIS — N18.31 CHRONIC KIDNEY DISEASE, STAGE 3A: ICD-10-CM

## 2023-05-05 DIAGNOSIS — J43.2 CENTRILOBULAR EMPHYSEMA: Chronic | ICD-10-CM

## 2023-05-05 DIAGNOSIS — M25.561 ACUTE PAIN OF RIGHT KNEE: ICD-10-CM

## 2023-05-05 DIAGNOSIS — Z79.899 HIGH RISK MEDICATION USE: ICD-10-CM

## 2023-05-05 DIAGNOSIS — I27.20 PULMONARY HYPERTENSION, UNSPECIFIED: ICD-10-CM

## 2023-05-05 DIAGNOSIS — J43.2 CENTRILOBULAR EMPHYSEMA: ICD-10-CM

## 2023-05-05 DIAGNOSIS — R73.03 PREDIABETES: ICD-10-CM

## 2023-05-05 DIAGNOSIS — F03.90 DEMENTIA, UNSPECIFIED DEMENTIA SEVERITY, UNSPECIFIED DEMENTIA TYPE, UNSPECIFIED WHETHER BEHAVIORAL, PSYCHOTIC, OR MOOD DISTURBANCE OR ANXIETY: ICD-10-CM

## 2023-05-05 DIAGNOSIS — Z00.00 ROUTINE GENERAL MEDICAL EXAMINATION AT A HEALTH CARE FACILITY: Primary | ICD-10-CM

## 2023-05-05 LAB
ALBUMIN SERPL BCP-MCNC: 3.6 G/DL (ref 3.5–5.2)
ALP SERPL-CCNC: 96 U/L (ref 55–135)
ALT SERPL W/O P-5'-P-CCNC: 11 U/L (ref 10–44)
ANION GAP SERPL CALC-SCNC: 15 MMOL/L (ref 8–16)
AST SERPL-CCNC: 19 U/L (ref 10–40)
BILIRUB SERPL-MCNC: 0.3 MG/DL (ref 0.1–1)
BUN SERPL-MCNC: 40 MG/DL (ref 8–23)
CALCIUM SERPL-MCNC: 10 MG/DL (ref 8.7–10.5)
CHLORIDE SERPL-SCNC: 93 MMOL/L (ref 95–110)
CHOLEST SERPL-MCNC: 174 MG/DL (ref 120–199)
CHOLEST/HDLC SERPL: 3.7 {RATIO} (ref 2–5)
CO2 SERPL-SCNC: 34 MMOL/L (ref 23–29)
CREAT SERPL-MCNC: 1 MG/DL (ref 0.5–1.4)
ERYTHROCYTE [DISTWIDTH] IN BLOOD BY AUTOMATED COUNT: 15 % (ref 11.5–14.5)
EST. GFR  (NO RACE VARIABLE): 58 ML/MIN/1.73 M^2
ESTIMATED AVG GLUCOSE: 117 MG/DL (ref 68–131)
FOLATE SERPL-MCNC: 10.3 NG/ML (ref 4–24)
GLUCOSE SERPL-MCNC: 85 MG/DL (ref 70–110)
HBA1C MFR BLD: 5.7 % (ref 4–5.6)
HCT VFR BLD AUTO: 50 % (ref 37–48.5)
HDLC SERPL-MCNC: 47 MG/DL (ref 40–75)
HDLC SERPL: 27 % (ref 20–50)
HGB BLD-MCNC: 15.3 G/DL (ref 12–16)
IRON SERPL-MCNC: 108 UG/DL (ref 30–160)
LDLC SERPL CALC-MCNC: 97.8 MG/DL (ref 63–159)
MCH RBC QN AUTO: 29.5 PG (ref 27–31)
MCHC RBC AUTO-ENTMCNC: 30.6 G/DL (ref 32–36)
MCV RBC AUTO: 97 FL (ref 82–98)
NONHDLC SERPL-MCNC: 127 MG/DL
PLATELET # BLD AUTO: 273 K/UL (ref 150–450)
PMV BLD AUTO: 11.1 FL (ref 9.2–12.9)
POTASSIUM SERPL-SCNC: 4.1 MMOL/L (ref 3.5–5.1)
PROT SERPL-MCNC: 7.6 G/DL (ref 6–8.4)
RBC # BLD AUTO: 5.18 M/UL (ref 4–5.4)
SODIUM SERPL-SCNC: 142 MMOL/L (ref 136–145)
TRIGL SERPL-MCNC: 146 MG/DL (ref 30–150)
TSH SERPL DL<=0.005 MIU/L-ACNC: 2.89 UIU/ML (ref 0.4–4)
VIT B12 SERPL-MCNC: 393 PG/ML (ref 210–950)
WBC # BLD AUTO: 9.33 K/UL (ref 3.9–12.7)

## 2023-05-05 PROCEDURE — 1101F PT FALLS ASSESS-DOCD LE1/YR: CPT | Mod: CPTII,S$GLB,, | Performed by: FAMILY MEDICINE

## 2023-05-05 PROCEDURE — 3074F SYST BP LT 130 MM HG: CPT | Mod: CPTII,S$GLB,, | Performed by: FAMILY MEDICINE

## 2023-05-05 PROCEDURE — 83540 ASSAY OF IRON: CPT | Performed by: FAMILY MEDICINE

## 2023-05-05 PROCEDURE — 99999 PR PBB SHADOW E&M-EST. PATIENT-LVL III: ICD-10-PCS | Mod: PBBFAC,,, | Performed by: FAMILY MEDICINE

## 2023-05-05 PROCEDURE — 3078F DIAST BP <80 MM HG: CPT | Mod: CPTII,S$GLB,, | Performed by: FAMILY MEDICINE

## 2023-05-05 PROCEDURE — 1126F AMNT PAIN NOTED NONE PRSNT: CPT | Mod: CPTII,S$GLB,, | Performed by: FAMILY MEDICINE

## 2023-05-05 PROCEDURE — 1160F RVW MEDS BY RX/DR IN RCRD: CPT | Mod: CPTII,S$GLB,, | Performed by: FAMILY MEDICINE

## 2023-05-05 PROCEDURE — 82746 ASSAY OF FOLIC ACID SERUM: CPT | Performed by: FAMILY MEDICINE

## 2023-05-05 PROCEDURE — 99999 PR PBB SHADOW E&M-EST. PATIENT-LVL III: CPT | Mod: PBBFAC,,, | Performed by: FAMILY MEDICINE

## 2023-05-05 PROCEDURE — 3074F PR MOST RECENT SYSTOLIC BLOOD PRESSURE < 130 MM HG: ICD-10-PCS | Mod: CPTII,S$GLB,, | Performed by: FAMILY MEDICINE

## 2023-05-05 PROCEDURE — 80061 LIPID PANEL: CPT | Performed by: FAMILY MEDICINE

## 2023-05-05 PROCEDURE — 85027 COMPLETE CBC AUTOMATED: CPT | Performed by: FAMILY MEDICINE

## 2023-05-05 PROCEDURE — 36415 COLL VENOUS BLD VENIPUNCTURE: CPT | Mod: PN | Performed by: FAMILY MEDICINE

## 2023-05-05 PROCEDURE — 84443 ASSAY THYROID STIM HORMONE: CPT | Performed by: FAMILY MEDICINE

## 2023-05-05 PROCEDURE — 1101F PR PT FALLS ASSESS DOC 0-1 FALLS W/OUT INJ PAST YR: ICD-10-PCS | Mod: CPTII,S$GLB,, | Performed by: FAMILY MEDICINE

## 2023-05-05 PROCEDURE — 83036 HEMOGLOBIN GLYCOSYLATED A1C: CPT | Performed by: FAMILY MEDICINE

## 2023-05-05 PROCEDURE — 3288F FALL RISK ASSESSMENT DOCD: CPT | Mod: CPTII,S$GLB,, | Performed by: FAMILY MEDICINE

## 2023-05-05 PROCEDURE — 80053 COMPREHEN METABOLIC PANEL: CPT | Performed by: FAMILY MEDICINE

## 2023-05-05 PROCEDURE — 3288F PR FALLS RISK ASSESSMENT DOCUMENTED: ICD-10-PCS | Mod: CPTII,S$GLB,, | Performed by: FAMILY MEDICINE

## 2023-05-05 PROCEDURE — 1160F PR REVIEW ALL MEDS BY PRESCRIBER/CLIN PHARMACIST DOCUMENTED: ICD-10-PCS | Mod: CPTII,S$GLB,, | Performed by: FAMILY MEDICINE

## 2023-05-05 PROCEDURE — 82607 VITAMIN B-12: CPT | Performed by: FAMILY MEDICINE

## 2023-05-05 PROCEDURE — 99214 OFFICE O/P EST MOD 30 MIN: CPT | Mod: S$GLB,,, | Performed by: FAMILY MEDICINE

## 2023-05-05 PROCEDURE — 99214 PR OFFICE/OUTPT VISIT, EST, LEVL IV, 30-39 MIN: ICD-10-PCS | Mod: S$GLB,,, | Performed by: FAMILY MEDICINE

## 2023-05-05 PROCEDURE — 1159F PR MEDICATION LIST DOCUMENTED IN MEDICAL RECORD: ICD-10-PCS | Mod: CPTII,S$GLB,, | Performed by: FAMILY MEDICINE

## 2023-05-05 PROCEDURE — 1126F PR PAIN SEVERITY QUANTIFIED, NO PAIN PRESENT: ICD-10-PCS | Mod: CPTII,S$GLB,, | Performed by: FAMILY MEDICINE

## 2023-05-05 PROCEDURE — 1159F MED LIST DOCD IN RCRD: CPT | Mod: CPTII,S$GLB,, | Performed by: FAMILY MEDICINE

## 2023-05-05 PROCEDURE — 3078F PR MOST RECENT DIASTOLIC BLOOD PRESSURE < 80 MM HG: ICD-10-PCS | Mod: CPTII,S$GLB,, | Performed by: FAMILY MEDICINE

## 2023-05-05 RX ORDER — BUDESONIDE 0.5 MG/2ML
0.5 INHALANT ORAL 2 TIMES DAILY PRN
Qty: 100 ML | Refills: 2 | Status: SHIPPED | OUTPATIENT
Start: 2023-05-05 | End: 2023-11-03 | Stop reason: SDUPTHER

## 2023-05-05 RX ORDER — DICLOFENAC SODIUM 10 MG/G
GEL TOPICAL
Qty: 100 G | Refills: 1 | Status: CANCELLED | OUTPATIENT
Start: 2023-05-05

## 2023-05-05 RX ORDER — ESCITALOPRAM OXALATE 20 MG/1
20 TABLET ORAL NIGHTLY
Qty: 90 TABLET | Refills: 3 | Status: SHIPPED | OUTPATIENT
Start: 2023-05-05

## 2023-05-05 RX ORDER — NORTRIPTYLINE HYDROCHLORIDE 25 MG/1
25 CAPSULE ORAL NIGHTLY
Qty: 90 CAPSULE | Refills: 3 | Status: SHIPPED | OUTPATIENT
Start: 2023-05-05

## 2023-05-05 RX ORDER — DICLOFENAC SODIUM 10 MG/G
GEL TOPICAL
Qty: 100 G | Refills: 1 | Status: SHIPPED | OUTPATIENT
Start: 2023-05-05 | End: 2023-06-16 | Stop reason: SDUPTHER

## 2023-05-05 NOTE — Clinical Note
Is there someone in pain mgmt you can talk to about whoever is handling their phone calls? The daughter had to actually go up there to get an appt scheduled after 2 weeks of back and forth and there is no documentation of phone calls.

## 2023-05-05 NOTE — PROGRESS NOTES
Subjective:       Patient ID: Luda Peters is a 77 y.o. female.    Chief Complaint: Annual Exam      Luda Peters is in the office for annual exam.    [facility] [place]  Medical hx reviewed.   Since lov, saw pain mgmt re R low back pain. Has MRI scheduled at [facility] next week hoping to repeat her lumbar LIN following. Notes pain has improved slightly the last few days.   Past Medical History:   Diagnosis Date    Anticoagulant long-term use     Anxiety 9/26/2016    Back pain     Closed fracture of second lumbar vertebra with routine healing 12/7/2015    COPD (chronic obstructive pulmonary disease) 9/26/2016    Emphysema of lung     Encounter for blood transfusion     Gastric ulcer     Hypertension      They've noticed some occ twitching, R side (hand, arm, leg), occ L. Usually mornings, lasts a few mins; occ afternoon. Less frequent of late.    Most recent rx of abilify is a different pill color, not scored.   Gabapentin is currently 300mg BID. Tylenol 650 BID.     Current Outpatient Medications:     albuterol (PROVENTIL/VENTOLIN HFA) 90 mcg/actuation inhaler, Inhale 2 puffs into the lungs every 6 (six) hours as needed for Wheezing or Shortness of Breath. Rescue, Disp: 3 g, Rfl: 6    albuterol-ipratropium (DUO-NEB) 2.5 mg-0.5 mg/3 mL nebulizer solution, Take 3 mLs by nebulization every 6 (six) hours as needed for Wheezing. Rescue, Disp: 75 mL, Rfl: 5    allopurinoL (ZYLOPRIM) 100 MG tablet, Take 1 tablet (100 mg total) by mouth once daily., Disp: 90 tablet, Rfl: 1    ARIPiprazole (ABILIFY) 2 MG Tab, TAKE 1 TABLET BY MOUTH EVERY DAY (Patient taking differently: Take 1 mg by mouth once daily.), Disp: 90 tablet, Rfl: 1    ascorbic acid, vitamin C, (VITAMIN C) 1000 MG tablet, Take 1,000 mg by mouth daily as needed (immune support)., Disp: , Rfl:     clopidogreL (PLAVIX) 75 mg tablet, TAKE 1 TABLET BY MOUTH EVERY DAY, Disp: 90 tablet, Rfl: 1    docusate sodium (COLACE) 100 MG capsule, Take 100 mg by mouth 2 (two) times  daily., Disp: , Rfl:     furosemide (LASIX) 40 MG tablet, TAKE 1 TABLET BY MOUTH TWICE DAILY, Disp: 180 tablet, Rfl: 1    gabapentin (NEURONTIN) 600 MG tablet, TAKE 1 TABLET(600 MG) BY MOUTH TWICE DAILY, Disp: 180 tablet, Rfl: 1    metoprolol tartrate (LOPRESSOR) 25 MG tablet, TAKE 1 TABLET BY MOUTH TWICE DAILY, Disp: 180 tablet, Rfl: 0    multivitamin (THERAGRAN) per tablet, Take 1 tablet by mouth daily as needed (immune support)., Disp: , Rfl:     naproxen sodium (ANAPROX) 220 MG tablet, Take 220 mg by mouth 2 (two) times daily with meals., Disp: , Rfl:     pantoprazole (PROTONIX) 40 MG tablet, TAKE 1 TABLET BY MOUTH TWICE DAILY, Disp: 180 tablet, Rfl: 1    zinc acetate 50 mg (zinc) Cap, Take 1 tablet by mouth daily as needed (immune support)., Disp: , Rfl:     budesonide (PULMICORT) 0.5 mg/2 mL nebulizer solution, Take 2 mLs (0.5 mg total) by nebulization 2 (two) times daily as needed (wheezing). USE 1 VIAL VIA NEBULIZER EVERY 12 HOURS, Disp: 100 mL, Rfl: 2    diclofenac sodium (VOLTAREN) 1 % Gel, APPLY 2 GRAMS TOPICALLY TO THE AFFECTED AREA TWICE DAILY, Disp: 100 g, Rfl: 1    EScitalopram oxalate (LEXAPRO) 20 MG tablet, Take 1 tablet (20 mg total) by mouth every evening., Disp: 90 tablet, Rfl: 3    fluticasone-umeclidin-vilanter (TRELEGY ELLIPTA) 100-62.5-25 mcg DsDv, Inhale 1 puff into the lungs once daily., Disp: 60 each, Rfl: 3    nortriptyline (PAMELOR) 25 MG capsule, Take 1 capsule (25 mg total) by mouth every evening., Disp: 90 capsule, Rfl: 3    The 10-year ASCVD risk score (Leny ALY, et al., 2019) is: 12.4%    Values used to calculate the score:      Age: 77 years      Sex: Female      Is Non- : No      Diabetic: No      Tobacco smoker: No      Systolic Blood Pressure: 100 mmHg      Is BP treated: No      HDL Cholesterol: 47 mg/dL      Total Cholesterol: 159 mg/dL     Lab Results   Component Value Date    HGBA1C 5.9 (H) 10/15/2021    HGBA1C 5.6 02/19/2021    HGBA1C 5.9 (H)  08/15/2020     Lab Results   Component Value Date    LDLCALC 86.2 10/15/2021    CREATININE 0.63 02/10/2022   Labs 2022 rev.     Review of Systems   Constitutional:  Negative for fatigue and unexpected weight change.   HENT:  Negative for congestion and sore throat.    Eyes:  Negative for photophobia and visual disturbance.   Respiratory:  Positive for cough and shortness of breath.    Cardiovascular:  Negative for palpitations and leg swelling.   Gastrointestinal:  Positive for constipation. Negative for abdominal pain, blood in stool and diarrhea.   Genitourinary:  Positive for frequency. Negative for difficulty urinating.   Musculoskeletal:  Positive for arthralgias, gait problem and myalgias.   Neurological:  Negative for dizziness, light-headedness and headaches.   Psychiatric/Behavioral:  Negative for sleep disturbance (more comfortable in her recliner of late). The patient is nervous/anxious.          Objective:      Physical Exam  Vitals and nursing note reviewed.   Constitutional:       General: She is not in acute distress.     Appearance: Normal appearance. She is well-developed. She is obese.   HENT:      Head: Normocephalic and atraumatic.      Right Ear: Tympanic membrane and external ear normal.      Left Ear: Tympanic membrane and external ear normal.      Nose: Nose normal.      Mouth/Throat:      Pharynx: No oropharyngeal exudate.   Eyes:      Conjunctiva/sclera: Conjunctivae normal.      Pupils: Pupils are equal, round, and reactive to light.   Neck:      Thyroid: No thyromegaly.   Cardiovascular:      Rate and Rhythm: Normal rate and regular rhythm.   Pulmonary:      Effort: Pulmonary effort is normal. No respiratory distress.      Breath sounds: Normal breath sounds. No wheezing.   Abdominal:      General: Bowel sounds are normal. There is no distension.      Palpations: Abdomen is soft. There is no mass.      Tenderness: There is no abdominal tenderness. There is no guarding or rebound.    Musculoskeletal:      Cervical back: Neck supple.      Right lower leg: No edema.      Left lower leg: No edema.   Lymphadenopathy:      Cervical: No cervical adenopathy.   Skin:     General: Skin is warm and dry.   Neurological:      Mental Status: She is alert and oriented to person, place, and time.      Cranial Nerves: No cranial nerve deficit.   Psychiatric:         Mood and Affect: Mood normal.         Behavior: Behavior normal.           Screening recommendations appropriate to age and health status were reviewed.    Assessment & Plan:    Routine general medical examination at a health care facility  Comments:  anticipatory guidance reviewed    Acute pain of right knee  -     diclofenac sodium (VOLTAREN) 1 % Gel; APPLY 2 GRAMS TOPICALLY TO THE AFFECTED AREA TWICE DAILY  Dispense: 100 g; Refill: 1    Centrilobular emphysema  Comments:  relatively stable, cont meds and f/u per pulm  Orders:  -     Discontinue: fluticasone-umeclidin-vilanter (TRELEGY ELLIPTA) 100-62.5-25 mcg DsDv; Inhale 1 puff into the lungs once daily.  Dispense: 60 each; Refill: 3  -     fluticasone-umeclidin-vilanter (TRELEGY ELLIPTA) 100-62.5-25 mcg DsDv; Inhale 1 puff into the lungs once daily.  Dispense: 60 each; Refill: 3  -     budesonide (PULMICORT) 0.5 mg/2 mL nebulizer solution; Take 2 mLs (0.5 mg total) by nebulization 2 (two) times daily as needed (wheezing). USE 1 VIAL VIA NEBULIZER EVERY 12 HOURS  Dispense: 100 mL; Refill: 2    Chronic kidney disease, stage 3a  -     CBC Without Differential; Future; Expected date: 05/05/2023  -     Comprehensive Metabolic Panel; Future; Expected date: 05/05/2023  -     TSH; Future; Expected date: 05/05/2023  -     Urinalysis, Reflex to Urine Culture Urine, Clean Catch; Future  -     Hemoglobin A1C; Future; Expected date: 05/05/2023  -     Lipid Panel; Future; Expected date: 05/05/2023  -     Folate; Future; Expected date: 05/05/2023  -     Iron; Future; Expected date: 05/05/2023  -     Vitamin  B12; Future; Expected date: 05/05/2023    Dementia, unspecified dementia severity, unspecified dementia type, unspecified whether behavioral, psychotic, or mood disturbance or anxiety  Comments:  stable    Prediabetes  -     CBC Without Differential; Future; Expected date: 05/05/2023  -     Comprehensive Metabolic Panel; Future; Expected date: 05/05/2023  -     TSH; Future; Expected date: 05/05/2023  -     Urinalysis, Reflex to Urine Culture Urine, Clean Catch; Future  -     Hemoglobin A1C; Future; Expected date: 05/05/2023  -     Lipid Panel; Future; Expected date: 05/05/2023  -     Folate; Future; Expected date: 05/05/2023  -     Iron; Future; Expected date: 05/05/2023  -     Vitamin B12; Future; Expected date: 05/05/2023    Atherosclerosis of aorta  -     CBC Without Differential; Future; Expected date: 05/05/2023  -     Comprehensive Metabolic Panel; Future; Expected date: 05/05/2023  -     TSH; Future; Expected date: 05/05/2023  -     Urinalysis, Reflex to Urine Culture Urine, Clean Catch; Future  -     Hemoglobin A1C; Future; Expected date: 05/05/2023  -     Lipid Panel; Future; Expected date: 05/05/2023  -     Folate; Future; Expected date: 05/05/2023  -     Iron; Future; Expected date: 05/05/2023  -     Vitamin B12; Future; Expected date: 05/05/2023    High risk medication use  -     CBC Without Differential; Future; Expected date: 05/05/2023  -     Comprehensive Metabolic Panel; Future; Expected date: 05/05/2023  -     TSH; Future; Expected date: 05/05/2023  -     Urinalysis, Reflex to Urine Culture Urine, Clean Catch; Future  -     Hemoglobin A1C; Future; Expected date: 05/05/2023  -     Lipid Panel; Future; Expected date: 05/05/2023  -     Folate; Future; Expected date: 05/05/2023  -     Iron; Future; Expected date: 05/05/2023  -     Vitamin B12; Future; Expected date: 05/05/2023    Pulmonary hypertension, unspecified  Comments:  cont oxygen suppl    Centrilobular emphysema  -     Discontinue:  fluticasone-umeclidin-vilanter (TRELEGY ELLIPTA) 100-62.5-25 mcg DsDv; Inhale 1 puff into the lungs once daily.  Dispense: 60 each; Refill: 3  -     fluticasone-umeclidin-vilanter (TRELEGY ELLIPTA) 100-62.5-25 mcg DsDv; Inhale 1 puff into the lungs once daily.  Dispense: 60 each; Refill: 3  -     budesonide (PULMICORT) 0.5 mg/2 mL nebulizer solution; Take 2 mLs (0.5 mg total) by nebulization 2 (two) times daily as needed (wheezing). USE 1 VIAL VIA NEBULIZER EVERY 12 HOURS  Dispense: 100 mL; Refill: 2    Other orders  -     EScitalopram oxalate (LEXAPRO) 20 MG tablet; Take 1 tablet (20 mg total) by mouth every evening.  Dispense: 90 tablet; Refill: 3  -     nortriptyline (PAMELOR) 25 MG capsule; Take 1 capsule (25 mg total) by mouth every evening.  Dispense: 90 capsule; Refill: 3

## 2023-05-12 ENCOUNTER — TELEPHONE (OUTPATIENT)
Dept: FAMILY MEDICINE | Facility: CLINIC | Age: 78
End: 2023-05-12
Payer: MEDICARE

## 2023-05-12 ENCOUNTER — OFFICE VISIT (OUTPATIENT)
Dept: OBSTETRICS AND GYNECOLOGY | Facility: CLINIC | Age: 78
End: 2023-05-12
Payer: MEDICARE

## 2023-05-12 ENCOUNTER — HOSPITAL ENCOUNTER (OUTPATIENT)
Dept: RADIOLOGY | Facility: HOSPITAL | Age: 78
Discharge: HOME | End: 2023-05-12
Attending: SPECIALIST
Payer: MEDICARE

## 2023-05-12 VITALS
BODY MASS INDEX: 31.56 KG/M2 | DIASTOLIC BLOOD PRESSURE: 62 MMHG | HEIGHT: 62 IN | SYSTOLIC BLOOD PRESSURE: 128 MMHG | WEIGHT: 171.5 LBS

## 2023-05-12 DIAGNOSIS — Z12.31 VISIT FOR SCREENING MAMMOGRAM: ICD-10-CM

## 2023-05-12 DIAGNOSIS — Z12.31 VISIT FOR SCREENING MAMMOGRAM: Primary | ICD-10-CM

## 2023-05-12 PROCEDURE — 3078F PR MOST RECENT DIASTOLIC BLOOD PRESSURE < 80 MM HG: ICD-10-PCS | Mod: CPTII,S$GLB,, | Performed by: SPECIALIST

## 2023-05-12 PROCEDURE — 99213 OFFICE O/P EST LOW 20 MIN: CPT | Mod: S$GLB,,, | Performed by: SPECIALIST

## 2023-05-12 PROCEDURE — 1126F AMNT PAIN NOTED NONE PRSNT: CPT | Mod: CPTII,S$GLB,, | Performed by: SPECIALIST

## 2023-05-12 PROCEDURE — 3074F PR MOST RECENT SYSTOLIC BLOOD PRESSURE < 130 MM HG: ICD-10-PCS | Mod: CPTII,S$GLB,, | Performed by: SPECIALIST

## 2023-05-12 PROCEDURE — 1126F PR PAIN SEVERITY QUANTIFIED, NO PAIN PRESENT: ICD-10-PCS | Mod: CPTII,S$GLB,, | Performed by: SPECIALIST

## 2023-05-12 PROCEDURE — 77063 MAMMO DIGITAL SCREENING BILAT WITH TOMO: ICD-10-PCS | Mod: 26,,, | Performed by: RADIOLOGY

## 2023-05-12 PROCEDURE — 3074F SYST BP LT 130 MM HG: CPT | Mod: CPTII,S$GLB,, | Performed by: SPECIALIST

## 2023-05-12 PROCEDURE — 3078F DIAST BP <80 MM HG: CPT | Mod: CPTII,S$GLB,, | Performed by: SPECIALIST

## 2023-05-12 PROCEDURE — 1159F MED LIST DOCD IN RCRD: CPT | Mod: CPTII,S$GLB,, | Performed by: SPECIALIST

## 2023-05-12 PROCEDURE — 99999 PR PBB SHADOW E&M-EST. PATIENT-LVL IV: ICD-10-PCS | Mod: PBBFAC,,, | Performed by: SPECIALIST

## 2023-05-12 PROCEDURE — 77067 MAMMO DIGITAL SCREENING BILAT WITH TOMO: ICD-10-PCS | Mod: 26,,, | Performed by: RADIOLOGY

## 2023-05-12 PROCEDURE — 99213 PR OFFICE/OUTPT VISIT, EST, LEVL III, 20-29 MIN: ICD-10-PCS | Mod: S$GLB,,, | Performed by: SPECIALIST

## 2023-05-12 PROCEDURE — 99999 PR PBB SHADOW E&M-EST. PATIENT-LVL IV: CPT | Mod: PBBFAC,,, | Performed by: SPECIALIST

## 2023-05-12 PROCEDURE — 1159F PR MEDICATION LIST DOCUMENTED IN MEDICAL RECORD: ICD-10-PCS | Mod: CPTII,S$GLB,, | Performed by: SPECIALIST

## 2023-05-12 PROCEDURE — 77067 SCR MAMMO BI INCL CAD: CPT | Mod: TC,PN

## 2023-05-12 PROCEDURE — 77067 SCR MAMMO BI INCL CAD: CPT | Mod: 26,,, | Performed by: RADIOLOGY

## 2023-05-12 PROCEDURE — 77063 BREAST TOMOSYNTHESIS BI: CPT | Mod: 26,,, | Performed by: RADIOLOGY

## 2023-05-12 NOTE — PROGRESS NOTES
77 yo WF presents for annual gyn eval and screening mammogram  No overt gyn complaints  Past Medical History:   Diagnosis Date    Anticoagulant long-term use     Anxiety 2016    Back pain     Closed fracture of second lumbar vertebra with routine healing 2015    COPD (chronic obstructive pulmonary disease) 2016    Emphysema of lung     Encounter for blood transfusion     Gastric ulcer     Hypertension        Past Surgical History:   Procedure Laterality Date    ESOPHAGOGASTRODUODENOSCOPY N/A 3/22/2019    Procedure: EGD (ESOPHAGOGASTRODUODENOSCOPY);  Surgeon: Nomi Fajardo Jr., MD;  Location: [facility];  Service: Endoscopy;  Laterality: N/A;    HYSTERECTOMY      TRANSFORAMINAL EPIDURAL INJECTION OF STEROID Right 2021    Procedure: Injection,steroid,epidural,transforaminal approach L4/5;  Surgeon: Casey Mccall MD;  Location: [facility];  Service: Pain Management;  Laterality: Right;       Family History   Problem Relation Age of Onset    No Known Problems Son        Social History     Socioeconomic History    Marital status:    Tobacco Use    Smoking status: Former     Packs/day: 1.00     Types: Cigarettes     Quit date: 10/26/2014     Years since quittin.5    Smokeless tobacco: Never   Substance and Sexual Activity    Alcohol use: Yes     Alcohol/week: 0.0 standard drinks     Comment: Social    Drug use: No   Social History Narrative    Originally from [place].     Social Determinants of Health     Financial Resource Strain: Unknown    Difficulty of Paying Living Expenses: Patient refused   Food Insecurity: Unknown    Worried About Running Out of Food in the Last Year: Patient refused    Ran Out of Food in the Last Year: Patient refused   Transportation Needs: No Transportation Needs    Lack of Transportation (Medical): No    Lack of Transportation (Non-Medical): No   Physical Activity: Insufficiently Active    Days of Exercise per Week: 3 days    Minutes of Exercise per  Session: 20 min   Stress: Stress Concern Present    Feeling of Stress : Rather much   Social Connections: Unknown    Frequency of Communication with Friends and Family: More than three times a week    Frequency of Social Gatherings with Friends and Family: More than three times a week    Active Member of Clubs or Organizations: Patient refused    Attends Club or Organization Meetings: Patient refused    Marital Status:    Housing Stability: Unknown    Unable to Pay for Housing in the Last Year: Patient refused    Number of Places Lived in the Last Year: 1    Unstable Housing in the Last Year: No       Current Outpatient Medications   Medication Sig Dispense Refill    albuterol (PROVENTIL/VENTOLIN HFA) 90 mcg/actuation inhaler Inhale 2 puffs into the lungs every 6 (six) hours as needed for Wheezing or Shortness of Breath. Rescue 3 g 6    albuterol-ipratropium (DUO-NEB) 2.5 mg-0.5 mg/3 mL nebulizer solution Take 3 mLs by nebulization every 6 (six) hours as needed for Wheezing. Rescue 75 mL 5    allopurinoL (ZYLOPRIM) 100 MG tablet Take 1 tablet (100 mg total) by mouth once daily. 90 tablet 1    ARIPiprazole (ABILIFY) 2 MG Tab TAKE 1 TABLET BY MOUTH EVERY DAY (Patient taking differently: Take 1 mg by mouth once daily.) 90 tablet 1    ascorbic acid, vitamin C, (VITAMIN C) 1000 MG tablet Take 1,000 mg by mouth daily as needed (immune support).      budesonide (PULMICORT) 0.5 mg/2 mL nebulizer solution Take 2 mLs (0.5 mg total) by nebulization 2 (two) times daily as needed (wheezing). USE 1 VIAL VIA NEBULIZER EVERY 12 HOURS 100 mL 2    clopidogreL (PLAVIX) 75 mg tablet TAKE 1 TABLET BY MOUTH EVERY DAY 90 tablet 1    diclofenac sodium (VOLTAREN) 1 % Gel APPLY 2 GRAMS TOPICALLY TO THE AFFECTED AREA TWICE DAILY 100 g 1    docusate sodium (COLACE) 100 MG capsule Take 100 mg by mouth 2 (two) times daily.      EScitalopram oxalate (LEXAPRO) 20 MG tablet Take 1 tablet (20 mg total) by mouth every evening. 90 tablet 3     fluticasone-umeclidin-vilanter (TRELEGY ELLIPTA) 100-62.5-25 mcg DsDv Inhale 1 puff into the lungs once daily. 60 each 3    furosemide (LASIX) 40 MG tablet TAKE 1 TABLET BY MOUTH TWICE DAILY 180 tablet 1    gabapentin (NEURONTIN) 600 MG tablet TAKE 1 TABLET(600 MG) BY MOUTH TWICE DAILY 180 tablet 1    metoprolol tartrate (LOPRESSOR) 25 MG tablet TAKE 1 TABLET BY MOUTH TWICE DAILY 180 tablet 0    multivitamin (THERAGRAN) per tablet Take 1 tablet by mouth daily as needed (immune support).      naproxen sodium (ANAPROX) 220 MG tablet Take 220 mg by mouth 2 (two) times daily with meals.      nortriptyline (PAMELOR) 25 MG capsule Take 1 capsule (25 mg total) by mouth every evening. 90 capsule 3    pantoprazole (PROTONIX) 40 MG tablet TAKE 1 TABLET BY MOUTH TWICE DAILY 180 tablet 1    zinc acetate 50 mg (zinc) Cap Take 1 tablet by mouth daily as needed (immune support).       No current facility-administered medications for this visit.       Review of patient's allergies indicates:   Allergen Reactions    Iodine and iodide containing products        Review of System:   General: no chills, fever, night sweats, weight gain or weight loss  Psychological: no depression or suicidal ideation  Breasts: no new or changing breast lumps, nipple discharge or masses.  Respiratory: no cough, shortness of breath, or wheezing  Cardiovascular: no chest pain or dyspnea on exertion  Gastrointestinal: no abdominal pain, change in bowel habits, or black or bloody stools  Genito-Urinary: no incontinence, urinary frequency/urgency or vulvar/vaginal symptoms, pelvic pain or abnormal vaginal bleeding.  Musculoskeletal: no gait disturbance or muscular weakness PE:   APPEARANCE: Well nourished, well developed, in no acute distress.  NECK: Neck symmetric without masses or thyromegaly.  NODES: No inguinal lymph node enlargement.  ABDOMEN: Soft. No tenderness or masses. No hepatosplenomegaly. No hernias.  BREASTS: Symmetrical, no skin changes or  visible lesions. No palpable masses, nipple discharge or adenopathy bilaterally.  PELVIC: Normal external female genitalia without lesions. Normal hair distribution. Adequate perineal body, normal urethral meatus.  1cm nt inclusion cyst right inguinal foldVagina moist and well rugated without lesions or discharge. No significant cystocele or rectocele. Uterus and cervix surgically absent. Bimanual exam revealed no masses, tenderness or abnormality.    NOTE  NURSING PERSONAL PRESENT FOR ENTIRE PHYSICAL EXAM     Plan  BSE monthly  Screening mammogramtoday and yearly  RTO 2 years/prn  ,  I spent a total of 30 minutes on the day of the visit. This includes face to face time and non-face to face time preparing to see the patient (eg, review of tests), obtaining and/or reviewing separately obtained history, documenting clinical information in the electronic or other health record, independently interpreting results and communicating results to the patient/family/caregiver, or care coordinator.

## 2023-05-12 NOTE — TELEPHONE ENCOUNTER
----- Message from Cynthia Negron sent at 5/12/2023  3:17 PM CDT -----  Regarding: med refill  Can the clinic reply in [facility]:       Please refill the medication(s) listed below.       Please call the patient when the prescription(s) is ready for  at this phone number:         Medication #1 ARIPiprazole (ABILIFY) 2 MG Tab    Medication #2     Preferred Pharmacy:   [facility] #[id number] [place] [address] AT [facility] & [address] [address]  Phone: [phone] Fax: [phone]    INFO:Is trying to get the Abilify filled from the same manufacture as prior prescription to see if it was the reason for her arm tremors or spasm. Please advise.

## 2023-05-13 ENCOUNTER — PATIENT MESSAGE (OUTPATIENT)
Dept: PAIN MEDICINE | Facility: CLINIC | Age: 78
End: 2023-05-13
Payer: MEDICARE

## 2023-05-15 RX ORDER — ARIPIPRAZOLE 2 MG/1
2 TABLET ORAL DAILY
Qty: 90 TABLET | Refills: 3 | Status: SHIPPED | OUTPATIENT
Start: 2023-05-15

## 2023-05-15 NOTE — TELEPHONE ENCOUNTER
----- Message from Demetria Fuller sent at 5/15/2023  7:20 AM CDT -----  Type: Needs Medical Advice  Who Called:  pt daughter, Gabrielle   Symptoms (please be specific):  said she sent a message Friday for a new RX and no one called elvi back--said she need to speak to the nurse it's about her ARIPiprazole (ABILIFY) 2 MG Tab--please call and advise    Pharmacy name and phone #:    [facility] #[id number] [place] [address] AT [facility] & [address] [address]  Phone: [phone] Fax: [phone]      Best Call Back Number: [phone]    Additional Information: thank you

## 2023-05-15 NOTE — TELEPHONE ENCOUNTER
No care due was identified.  [facility] Embedded Care Due Messages. Reference number: [id number].   5/15/2023 10:02:13 AM CDT

## 2023-05-16 NOTE — TELEPHONE ENCOUNTER
Can we get her MRI with IV sedation at [facility].    We need to get some more advanced imaging than x-ray.  If we can not do with IV sedation then can potentially consider CT scan.

## 2023-05-29 ENCOUNTER — TELEPHONE (OUTPATIENT)
Dept: PAIN MEDICINE | Facility: CLINIC | Age: 78
End: 2023-05-29
Payer: MEDICARE

## 2023-05-29 NOTE — TELEPHONE ENCOUNTER
----- Message from Demetria Fuller sent at 5/29/2023  1:10 PM CDT -----  Type: Needs Medical Advice  Who Called:  pt daughter, Gabrielle  Symptoms (please be specific):  said she waiting on a response on when she can schedule a LIN-said it's been 10 days since she sent the message in the my chart--please call and advise  Best Call Back Number: [phone]  Additional Information: thank you

## 2023-05-30 ENCOUNTER — PATIENT MESSAGE (OUTPATIENT)
Dept: PAIN MEDICINE | Facility: CLINIC | Age: 78
End: 2023-05-30
Payer: MEDICARE

## 2023-05-30 NOTE — TELEPHONE ENCOUNTER
Unfortunately, images from x-ray will not be sufficient.  If she can not tolerate the MRI she can try a lumbar CT.

## 2023-05-31 DIAGNOSIS — M54.9 DORSALGIA, UNSPECIFIED: Primary | ICD-10-CM

## 2023-05-31 DIAGNOSIS — M54.16 LUMBAR RADICULOPATHY, CHRONIC: ICD-10-CM

## 2023-06-09 ENCOUNTER — TELEPHONE (OUTPATIENT)
Dept: PAIN MEDICINE | Facility: CLINIC | Age: 78
End: 2023-06-09
Payer: MEDICARE

## 2023-06-09 ENCOUNTER — HOSPITAL ENCOUNTER (OUTPATIENT)
Dept: RADIOLOGY | Facility: HOSPITAL | Age: 78
Discharge: HOME | End: 2023-06-09
Payer: MEDICARE

## 2023-06-09 ENCOUNTER — TELEPHONE (OUTPATIENT)
Dept: FAMILY MEDICINE | Facility: CLINIC | Age: 78
End: 2023-06-09
Payer: MEDICARE

## 2023-06-09 DIAGNOSIS — M79.605 PAIN IN BOTH LOWER EXTREMITIES: ICD-10-CM

## 2023-06-09 DIAGNOSIS — M54.9 DORSALGIA, UNSPECIFIED: ICD-10-CM

## 2023-06-09 DIAGNOSIS — M54.16 LUMBAR RADICULOPATHY, CHRONIC: ICD-10-CM

## 2023-06-09 DIAGNOSIS — R42 DIZZY: Primary | ICD-10-CM

## 2023-06-09 DIAGNOSIS — M79.604 PAIN IN BOTH LOWER EXTREMITIES: ICD-10-CM

## 2023-06-09 PROCEDURE — 72131 CT LUMBAR SPINE WITHOUT CONTRAST: ICD-10-PCS | Mod: 26,,, | Performed by: RADIOLOGY

## 2023-06-09 PROCEDURE — 72131 CT LUMBAR SPINE W/O DYE: CPT | Mod: 26,,, | Performed by: RADIOLOGY

## 2023-06-09 PROCEDURE — 72131 CT LUMBAR SPINE W/O DYE: CPT | Mod: TC,PO

## 2023-06-09 NOTE — TELEPHONE ENCOUNTER
----- Message from Diane Becker sent at 6/9/2023  9:53 AM CDT -----  Contact: Patient's daughter  Type:  Patient Needs Advice  Who Called:  Patient's daughter  What is this regarding?:  Pt is having some swelling on the right knee and dizziness. Could he order bloodwork or an ultrasound while the pt is there having her CT?  Best Call Back Number:  [phone]  Additional Information:  Please call the patient's daughter back at the phone number listed above to advise. Thank you!

## 2023-06-09 NOTE — TELEPHONE ENCOUNTER
Please let patient know I have reviewed her lumbar CT and she has significant central canal stenosis at L4/5 in addition to multilevel foraminal narrowing that is likely contributing to her right-sided pain.    Please schedule her for the following procedure:    Physician - Dr Mccall    Type of Procedure/Injection - Lumbar Epidural  L5/S1 to the right          Laterality - NA      Type of Sedation - Local      Need to hold medication - Yes      Aspirin for 7 days, Plavix for 7 days, and NSAIDs for 2 days      Clearance needed - Yes      Follow up - 4 week

## 2023-06-09 NOTE — TELEPHONE ENCOUNTER
Spoke with pt's daughter Gabrielle. Per daughter pt was c/o leg swelling and redness to right leg. VAS US venous to BLE ordered by her pain management doctor. Gabrielle wanting to reach out and update. Dr. Kline. Instructed daughter to contact clinic as needed. Verbalized understanding.

## 2023-06-09 NOTE — TELEPHONE ENCOUNTER
Let us start with the CT, based on imaging can consider and schedule the epidural.    For the lightheadedness I would follow-up with primary care.

## 2023-06-09 NOTE — TELEPHONE ENCOUNTER
----- Message from Adriana Garay sent at 6/9/2023  8:43 AM CDT -----  Contact: Gabrielle [phone]  Type: Needs Medical Advice  Who Called:  Pts daughter Gabrielle     Best Call Back Number: [phone]    Additional Information: Pts daughter Gabrielle stated pt has right knee and leg swelling w/redness. Gabrielle is  requesting xray or mri for her right knee. She is asking if this can be put in today because pt will be at [facility] getting ct done at 1pm. Pls call back and advise

## 2023-06-12 ENCOUNTER — PATIENT MESSAGE (OUTPATIENT)
Dept: PAIN MEDICINE | Facility: CLINIC | Age: 78
End: 2023-06-12
Payer: MEDICARE

## 2023-06-12 DIAGNOSIS — M54.16 LUMBAR RADICULOPATHY: Primary | ICD-10-CM

## 2023-06-12 RX ORDER — ALPRAZOLAM 0.5 MG/1
0.5 TABLET, ORALLY DISINTEGRATING ORAL ONCE AS NEEDED
Status: CANCELLED | OUTPATIENT
Start: 2023-06-12 | End: 2034-11-08

## 2023-06-16 DIAGNOSIS — M25.561 ACUTE PAIN OF RIGHT KNEE: ICD-10-CM

## 2023-06-16 RX ORDER — DICLOFENAC SODIUM 10 MG/G
GEL TOPICAL
Qty: 100 G | Refills: 1 | Status: SHIPPED | OUTPATIENT
Start: 2023-06-16 | End: 2023-09-12 | Stop reason: SDUPTHER

## 2023-06-23 ENCOUNTER — PES CALL (OUTPATIENT)
Dept: ADMINISTRATIVE | Facility: CLINIC | Age: 78
End: 2023-06-23
Payer: MEDICARE

## 2023-07-07 ENCOUNTER — HOSPITAL ENCOUNTER (OUTPATIENT)
Dept: RADIOLOGY | Facility: HOSPITAL | Age: 78
Discharge: HOME | End: 2023-07-07
Attending: ANESTHESIOLOGY | Admitting: ANESTHESIOLOGY
Payer: MEDICARE

## 2023-07-07 ENCOUNTER — HOSPITAL ENCOUNTER (OUTPATIENT)
Facility: HOSPITAL | Age: 78
Discharge: HOME | End: 2023-07-07
Attending: ANESTHESIOLOGY | Admitting: ANESTHESIOLOGY
Payer: MEDICARE

## 2023-07-07 VITALS
OXYGEN SATURATION: 96 % | HEART RATE: 74 BPM | WEIGHT: 187 LBS | TEMPERATURE: 98 F | RESPIRATION RATE: 16 BRPM | HEIGHT: 62 IN | DIASTOLIC BLOOD PRESSURE: 57 MMHG | SYSTOLIC BLOOD PRESSURE: 120 MMHG | BODY MASS INDEX: 34.41 KG/M2

## 2023-07-07 DIAGNOSIS — M54.16 LUMBAR RADICULOPATHY: ICD-10-CM

## 2023-07-07 DIAGNOSIS — M54.50 LOWER BACK PAIN: ICD-10-CM

## 2023-07-07 PROCEDURE — 62323 PR INJ LUMBAR/SACRAL, W/IMAGING GUIDANCE: ICD-10-PCS | Mod: ,,, | Performed by: ANESTHESIOLOGY

## 2023-07-07 PROCEDURE — 25000003 PHARM REV CODE 250: Mod: PO | Performed by: ANESTHESIOLOGY

## 2023-07-07 PROCEDURE — A4216 STERILE WATER/SALINE, 10 ML: HCPCS | Mod: PO | Performed by: ANESTHESIOLOGY

## 2023-07-07 PROCEDURE — 63600175 PHARM REV CODE 636 W HCPCS: Mod: PO | Performed by: ANESTHESIOLOGY

## 2023-07-07 PROCEDURE — 76000 FLUOROSCOPY <1 HR PHYS/QHP: CPT | Mod: TC,PO

## 2023-07-07 PROCEDURE — 62323 NJX INTERLAMINAR LMBR/SAC: CPT | Mod: ,,, | Performed by: ANESTHESIOLOGY

## 2023-07-07 PROCEDURE — 62323 NJX INTERLAMINAR LMBR/SAC: CPT | Mod: PO | Performed by: ANESTHESIOLOGY

## 2023-07-07 RX ORDER — METHYLPREDNISOLONE ACETATE 80 MG/ML
INJECTION, SUSPENSION INTRA-ARTICULAR; INTRALESIONAL; INTRAMUSCULAR; SOFT TISSUE
Status: DISCONTINUED | OUTPATIENT
Start: 2023-07-07 | End: 2023-07-07 | Stop reason: HOSPADM

## 2023-07-07 RX ORDER — SODIUM CHLORIDE 9 MG/ML
INJECTION, SOLUTION INTRAMUSCULAR; INTRAVENOUS; SUBCUTANEOUS
Status: DISCONTINUED | OUTPATIENT
Start: 2023-07-07 | End: 2023-07-07 | Stop reason: HOSPADM

## 2023-07-07 RX ORDER — LIDOCAINE HYDROCHLORIDE 10 MG/ML
INJECTION, SOLUTION EPIDURAL; INFILTRATION; INTRACAUDAL; PERINEURAL
Status: DISCONTINUED | OUTPATIENT
Start: 2023-07-07 | End: 2023-07-07 | Stop reason: HOSPADM

## 2023-07-07 RX ORDER — ALPRAZOLAM 0.5 MG/1
0.5 TABLET, ORALLY DISINTEGRATING ORAL ONCE AS NEEDED
Status: COMPLETED | OUTPATIENT
Start: 2023-07-07 | End: 2023-07-07

## 2023-07-07 RX ADMIN — ALPRAZOLAM 0.5 MG: 0.5 TABLET, ORALLY DISINTEGRATING ORAL at 12:07

## 2023-07-07 NOTE — H&P
CC: Back pain    HPI: The patient is a 79yo woman with a history of lumbar radiculopathy here for L5/S1 LIN. There are no major changes in history and physical from 23.    Past Medical History:   Diagnosis Date    Anticoagulant long-term use     Anxiety 2016    Back pain     Closed fracture of second lumbar vertebra with routine healing 2015    COPD (chronic obstructive pulmonary disease) 2016    Emphysema of lung     Encounter for blood transfusion     Gastric ulcer     Hypertension        Past Surgical History:   Procedure Laterality Date    ESOPHAGOGASTRODUODENOSCOPY N/A 3/22/2019    Procedure: EGD (ESOPHAGOGASTRODUODENOSCOPY);  Surgeon: Nomi Fajardo Jr., MD;  Location: [facility];  Service: Endoscopy;  Laterality: N/A;    HYSTERECTOMY      TRANSFORAMINAL EPIDURAL INJECTION OF STEROID Right 2021    Procedure: Injection,steroid,epidural,transforaminal approach L4/5;  Surgeon: Casey Mccall MD;  Location: [facility] OR;  Service: Pain Management;  Laterality: Right;       Family History   Problem Relation Age of Onset    No Known Problems Son        Social History     Socioeconomic History    Marital status:    Tobacco Use    Smoking status: Former     Packs/day: 1.00     Types: Cigarettes     Quit date: 10/26/2014     Years since quittin.7    Smokeless tobacco: Never   Substance and Sexual Activity    Alcohol use: Yes     Alcohol/week: 0.0 standard drinks     Comment: Social    Drug use: No   Social History Narrative    Originally from [place].     Social Determinants of Health     Financial Resource Strain: Unknown    Difficulty of Paying Living Expenses: Patient refused   Food Insecurity: Unknown    Worried About Running Out of Food in the Last Year: Patient refused    Ran Out of Food in the Last Year: Patient refused   Transportation Needs: No Transportation Needs    Lack of Transportation (Medical): No    Lack of Transportation (Non-Medical): No   Physical Activity:  "Insufficiently Active    Days of Exercise per Week: 3 days    Minutes of Exercise per Session: 20 min   Stress: Stress Concern Present    Feeling of Stress : Rather much   Social Connections: Unknown    Frequency of Communication with Friends and Family: More than three times a week    Frequency of Social Gatherings with Friends and Family: More than three times a week    Active Member of Clubs or Organizations: Patient refused    Attends Club or Organization Meetings: Patient refused    Marital Status:    Housing Stability: Unknown    Unable to Pay for Housing in the Last Year: Patient refused    Number of Places Lived in the Last Year: 1    Unstable Housing in the Last Year: No       Current Facility-Administered Medications   Medication Dose Route Frequency Provider Last Rate Last Admin    alprazolam ODT dissolvable tablet 0.5 mg  0.5 mg Oral Once PRN Casey Mccall MD           Review of patient's allergies indicates:   Allergen Reactions    Iodine and iodide containing products        Vitals:    07/05/23 1044 07/05/23 1045 07/07/23 1250   BP:   (!) 141/63   Pulse:   72   Resp:   18   Temp:   98.2 °F (36.8 °C)   TempSrc:   Skin   SpO2:   (!) 94%   Weight: 77.6 kg (171 lb) 84.8 kg (187 lb)    Height: 5' 2" (1.575 m)         ASA 2, Mallampati 2    REVIEW OF SYSTEMS:     GENERAL: No weight loss, malaise or fevers.  HEENT:  No recent changes in vision or hearing  NECK: Negative for lumps, no difficulty with swallowing.  RESPIRATORY: Negative for cough, wheezing or shortness of breath, patient denies any recent URI.  CARDIOVASCULAR: Negative for chest pain, leg swelling or palpitations.  GI: Negative for abdominal discomfort, blood in stools or black stools or change in bowel habits.  MUSCULOSKELETAL: See HPI.  SKIN: Negative for lesions, rash, and itching.  PSYCH: No suicidal or homicidal ideations, no current mood disturbances.  HEMATOLOGY/LYMPHOLOGY: Negative for prolonged bleeding, bruising easily or " swollen nodes. Patient is not currently taking any anti-coagulants  ENDO: No history of diabetes or thyroid dysfunction  NEURO: No history of syncope, paralysis, seizures or tremors.All other reviewed and negative other than HPI.    Physical exam:  Gen: A and O x3, pleasant, well-groomed  Skin: No rashes or obvious lesions  HEENT: PERRLA, no obvious deformities on ears or in canals. No thyroid masses, trachea midline, no palpable lymph nodes in neck, axilla.  CVS: Regular rate and rhythm, normal S1 and S2, no murmurs.  Resp: Clear to auscultation bilaterally.  Abdomen: Soft, NT/ND, normal bowel sounds present.  Musculoskeletal/Neuro: Moving all extremities    Assessment:  Lumbar radiculopathy  -     Case Request Operating Room: Injection-steroid-epidural-lumbar L5/S1  -     Place in Outpatient; Standing  -     Vital signs; Standing  -     Verify informed consent; Standing  -     Notify physician ; Standing  -     Notify physician ; Standing  -     Notify physician (specify); Standing  -     Diet NPO; Standing  -     alprazolam ODT dissolvable tablet 0.5 mg    Other orders  -     IP VTE HIGH RISK PATIENT; Standing

## 2023-07-07 NOTE — OP NOTE

## 2023-07-07 NOTE — DISCHARGE SUMMARY
[facility] - Surgery  Discharge Note  Short Stay    Procedure(s) (LRB):  Injection-steroid-epidural-lumbar L5/S1 (Right)      OUTCOME: Patient tolerated treatment/procedure well without complication and is now ready for discharge.    DISPOSITION: Home or Self Care    FINAL DIAGNOSIS:  Lumbar radiculopathy    FOLLOWUP: In clinic    DISCHARGE INSTRUCTIONS:    Discharge Procedure Orders   Diet Adult Regular     No dressing needed     Notify your health care provider if you experience any of the following:  temperature >100.4     Activity as tolerated

## 2023-07-07 NOTE — PLAN OF CARE
Vital signs stable. Discharge instructions reviewed with patient. Questions answered. Verbalized understanding.

## 2023-07-10 ENCOUNTER — TELEPHONE (OUTPATIENT)
Dept: PAIN MEDICINE | Facility: CLINIC | Age: 78
End: 2023-07-10
Payer: MEDICARE

## 2023-07-10 NOTE — TELEPHONE ENCOUNTER
----- Message from Jack Magana MA sent at 6/27/2023  4:34 PM CDT -----  Contact: pt's daughter Gabrielle at [phone]    ----- Message -----  From: Naga May  Sent: 6/27/2023   3:52 PM CDT  To: [facility] Staff    Type: Needs Medical Advice  Who Called:  pt's daughter Gabrielle  Best Call Back Number: [phone]  Additional Information: pt's daughter Gabrielle is calling the office to schedule her mother's Ultra [employer] for her legs she prefers this Friday the earliest if possible.

## 2023-07-19 RX ORDER — METOPROLOL TARTRATE 25 MG/1
TABLET, FILM COATED ORAL
Qty: 180 TABLET | Refills: 3 | Status: SHIPPED | OUTPATIENT
Start: 2023-07-19

## 2023-07-19 NOTE — TELEPHONE ENCOUNTER
Care Due:                  Date            Visit Type   Department     Provider  --------------------------------------------------------------------------------                                EP -                              PRIMARY      [facility]  Last Visit: 05-      CARE ([facility])   MEDICINE       Nuzhat Kline  Next Visit: None Scheduled  None         None Found                                                            Last  Test          Frequency    Reason                     Performed    Due Date  --------------------------------------------------------------------------------    Uric Acid...  12 months..  allopurinoL..............  Not Found    Overdue    [employer] Embedded Care Due Messages. Reference number: [id number].   7/19/2023 1:24:55 PM CDT

## 2023-07-19 NOTE — TELEPHONE ENCOUNTER
Provider Staff:  Action required for this patient     Please see care gap opportunities below in Care Due Message.    Thanks!  [facility]     Appointments      Date Provider   Last Visit   5/5/2023 Nuzhat Kline MD   Next Visit   Visit date not found Nuzhat Kline MD     Refill Decision Note   Luda Peters  is requesting a refill authorization.  Brief Assessment and Rationale for Refill:  Approve     Medication Therapy Plan:         Comments:     Note composed:4:47 PM 07/19/2023

## 2023-07-21 ENCOUNTER — HOSPITAL ENCOUNTER (OUTPATIENT)
Dept: RADIOLOGY | Facility: HOSPITAL | Age: 78
Discharge: HOME | End: 2023-07-21
Payer: MEDICARE

## 2023-07-21 DIAGNOSIS — M79.605 PAIN IN BOTH LOWER EXTREMITIES: ICD-10-CM

## 2023-07-21 DIAGNOSIS — M79.604 PAIN IN BOTH LOWER EXTREMITIES: ICD-10-CM

## 2023-07-21 PROCEDURE — 93970 EXTREMITY STUDY: CPT | Mod: TC,PO

## 2023-07-21 PROCEDURE — 93970 EXTREMITY STUDY: CPT | Mod: 26,,, | Performed by: RADIOLOGY

## 2023-07-21 PROCEDURE — 93970 US LOWER EXTREMITY VEINS BILATERAL: ICD-10-PCS | Mod: 26,,, | Performed by: RADIOLOGY

## 2023-08-04 ENCOUNTER — OFFICE VISIT (OUTPATIENT)
Dept: PAIN MEDICINE | Facility: CLINIC | Age: 78
End: 2023-08-04
Payer: MEDICARE

## 2023-08-04 VITALS
HEIGHT: 62 IN | BODY MASS INDEX: 30.91 KG/M2 | DIASTOLIC BLOOD PRESSURE: 74 MMHG | SYSTOLIC BLOOD PRESSURE: 122 MMHG | HEART RATE: 78 BPM | WEIGHT: 167.94 LBS

## 2023-08-04 DIAGNOSIS — M79.604 RIGHT LEG PAIN: ICD-10-CM

## 2023-08-04 DIAGNOSIS — M76.31 IT BAND SYNDROME, RIGHT: ICD-10-CM

## 2023-08-04 DIAGNOSIS — M54.16 LUMBAR RADICULOPATHY, CHRONIC: ICD-10-CM

## 2023-08-04 DIAGNOSIS — M70.61 GREATER TROCHANTERIC BURSITIS OF RIGHT HIP: Primary | ICD-10-CM

## 2023-08-04 DIAGNOSIS — M54.9 DORSALGIA, UNSPECIFIED: ICD-10-CM

## 2023-08-04 DIAGNOSIS — M54.16 LUMBAR RADICULOPATHY: ICD-10-CM

## 2023-08-04 PROCEDURE — 99999 PR PBB SHADOW E&M-EST. PATIENT-LVL IV: ICD-10-PCS | Mod: PBBFAC,,,

## 2023-08-04 PROCEDURE — 1125F PR PAIN SEVERITY QUANTIFIED, PAIN PRESENT: ICD-10-PCS | Mod: CPTII,S$GLB,,

## 2023-08-04 PROCEDURE — 1101F PT FALLS ASSESS-DOCD LE1/YR: CPT | Mod: CPTII,S$GLB,,

## 2023-08-04 PROCEDURE — 1101F PR PT FALLS ASSESS DOC 0-1 FALLS W/OUT INJ PAST YR: ICD-10-PCS | Mod: CPTII,S$GLB,,

## 2023-08-04 PROCEDURE — 99999 PR PBB SHADOW E&M-EST. PATIENT-LVL IV: CPT | Mod: PBBFAC,,,

## 2023-08-04 PROCEDURE — 3288F FALL RISK ASSESSMENT DOCD: CPT | Mod: CPTII,S$GLB,,

## 2023-08-04 PROCEDURE — 1159F MED LIST DOCD IN RCRD: CPT | Mod: CPTII,S$GLB,,

## 2023-08-04 PROCEDURE — 3078F PR MOST RECENT DIASTOLIC BLOOD PRESSURE < 80 MM HG: ICD-10-PCS | Mod: CPTII,S$GLB,,

## 2023-08-04 PROCEDURE — 3078F DIAST BP <80 MM HG: CPT | Mod: CPTII,S$GLB,,

## 2023-08-04 PROCEDURE — 3074F PR MOST RECENT SYSTOLIC BLOOD PRESSURE < 130 MM HG: ICD-10-PCS | Mod: CPTII,S$GLB,,

## 2023-08-04 PROCEDURE — 1125F AMNT PAIN NOTED PAIN PRSNT: CPT | Mod: CPTII,S$GLB,,

## 2023-08-04 PROCEDURE — 1159F PR MEDICATION LIST DOCUMENTED IN MEDICAL RECORD: ICD-10-PCS | Mod: CPTII,S$GLB,,

## 2023-08-04 PROCEDURE — 3288F PR FALLS RISK ASSESSMENT DOCUMENTED: ICD-10-PCS | Mod: CPTII,S$GLB,,

## 2023-08-04 PROCEDURE — 99214 OFFICE O/P EST MOD 30 MIN: CPT | Mod: S$GLB,,,

## 2023-08-04 PROCEDURE — 3074F SYST BP LT 130 MM HG: CPT | Mod: CPTII,S$GLB,,

## 2023-08-04 PROCEDURE — 99214 PR OFFICE/OUTPT VISIT, EST, LEVL IV, 30-39 MIN: ICD-10-PCS | Mod: S$GLB,,,

## 2023-08-04 NOTE — PROGRESS NOTES
This note was completed with dictation software and grammatical errors may exist.    CC: Right leg pain     HPI: The patient is a 76-year-old woman with a history of COPD, multiple lumbar compression fractures, anxiety who presents in referral from Dr. Kline for right leg pain.  She is status post L5/S1 LIN on 07/07/2023 with good relief for a few days but following that no significant relief.  She continues report severe right leg pain, 6/10, located in her leg leg but specifically on the right lateral aspect of her right leg.  Pain radiates down to knee and slightly below.  She denies any associated numbness, weakness or any new changes to her bowel bladder function.  She does report significant urge incontinence but denies any alyce incontinence.  She continues to take Tylenol and NSAIDs without significant relief.      Previous history: The patient states that she has had back pain and right leg pain for about 2 years but seems to be worsening recently.  She is a poor historian, her daughter is present with her today to help with the history.  She seems to state that the pain is in the right thigh, right groin, right buttock but also reports pain radiating all the way down the leg to the foot.  It is worse with walking, worse in the morning, worse at night and worse with getting out of a bed or a chair.  She does get some relief with sitting down, rest and medications.  She is taking Advil and Tylenol with mild relief.  She denies any alyce weakness, no bowel or bladder incontinence.      Pain intervention history:  She is status post right L4/5 transforaminal injection on 08/17/2018. She is status post L5/S1 LIN on 07/07/2023 with good relief for a few days but following that no significant relief.     Spine surgeries:    Antineuropathics:  NSAIDs:  Tylenol, Naprosyn  Physical therapy:  Has done physical therapy in the past with no benefit  Antidepressants:  Muscle relaxers:  Opioids:   Tramadol  Antiplatelets/Anticoagulants:        ROS: She reports fatigability, skin color change, vision change, hoarse voice, chest pain, wheezing and cough, constipation, easy bruising, joint stiffness, joint swelling, back pain, memory loss, difficulty sleeping, anxiety and loss of balance.  Balance of review of systems is negative.    Past Medical History:   Diagnosis Date    Anticoagulant long-term use     Anxiety 2016    Back pain     Closed fracture of second lumbar vertebra with routine healing 2015    COPD (chronic obstructive pulmonary disease) 2016    Emphysema of lung     Encounter for blood transfusion     Gastric ulcer     Hypertension        Past Surgical History:   Procedure Laterality Date    EPIDURAL STEROID INJECTION INTO LUMBAR SPINE Right 2023    Procedure: Injection-steroid-epidural-lumbar L5/S1;  Surgeon: Casey Mccall MD;  Location: [facility] OR;  Service: Pain Management;  Laterality: Right;    ESOPHAGOGASTRODUODENOSCOPY N/A 3/22/2019    Procedure: EGD (ESOPHAGOGASTRODUODENOSCOPY);  Surgeon: Nomi Fajardo Jr., MD;  Location: [facility];  Service: Endoscopy;  Laterality: N/A;    HYSTERECTOMY      TRANSFORAMINAL EPIDURAL INJECTION OF STEROID Right 2021    Procedure: Injection,steroid,epidural,transforaminal approach L4/5;  Surgeon: Casey Mccall MD;  Location: [facility] OR;  Service: Pain Management;  Laterality: Right;       Social History     Socioeconomic History    Marital status:    Tobacco Use    Smoking status: Former     Current packs/day: 0.00     Types: Cigarettes     Quit date: 10/26/2014     Years since quittin.7    Smokeless tobacco: Never   Substance and Sexual Activity    Alcohol use: Yes     Alcohol/week: 0.0 standard drinks of alcohol     Comment: Social    Drug use: No   Social History Narrative    Originally from [place].     Social Determinants of Health     Financial Resource Strain: Unknown (2023)    Overall Financial Resource  "Strain (CARDIA)     Difficulty of Paying Living Expenses: Patient refused   Food Insecurity: Unknown (5/5/2023)    Hunger Vital Sign     Worried About Running Out of Food in the Last Year: Patient refused     Ran Out of Food in the Last Year: Patient refused   Transportation Needs: No Transportation Needs (5/5/2023)    PRAPARE - Transportation     Lack of Transportation (Medical): No     Lack of Transportation (Non-Medical): No   Physical Activity: Insufficiently Active (5/5/2023)    Exercise Vital Sign     Days of Exercise per Week: 3 days     Minutes of Exercise per Session: 20 min   Stress: Stress Concern Present (7/8/2022)    [employer] - Occupational Stress Questionnaire     Feeling of Stress : Rather much   Social Connections: Unknown (5/5/2023)    Social Connection and Isolation Panel [NHANES]     Frequency of Communication with Friends and Family: More than three times a week     Frequency of Social Gatherings with Friends and Family: More than three times a week     Active Member of Clubs or Organizations: Patient refused     Attends Club or Organization Meetings: Patient refused     Marital Status:    Housing Stability: Unknown (5/5/2023)    Housing Stability Vital Sign     Unable to Pay for Housing in the Last Year: Patient refused     Unstable Housing in the Last Year: No         Medications/Allergies: See med card    Vitals:    08/04/23 0913   BP: 122/74   Pulse: 78   Weight: 76.2 kg (167 lb 15.1 oz)   Height: 5' 2" (1.575 m)   PainSc:   6   PainLoc: Leg         Physical exam:  Gen: A and O x3, pleasant, well-groomed  Skin: No rashes or obvious lesions  HEENT: PERRLA, no obvious deformities on ears or in canals. Trachea midline.  CVS: Regular rate and rhythm, normal palpable pulses.  Resp:No increased work of breathing, symmetrical chest rise.  Abdomen: Soft, NT/ND.    Musculoskeletal: Antalgic gait secondary to right leg pain, uses rolling walker pain.     " Neuro  Lower extremities: 5/5 strength bilaterally, some continued weakness with right hip flexion 4/5 likely pain related  Reflexes: Patellar 3+, Achilles 2+ bilaterally.  Sensory:  Intact and symmetrical to light touch and pinprick in L2-S1 dermatomes bilaterally.    Lumbar spine:  Lumbar spine: Range of motion is moderately reduced with both flexion and extension with increased pain in the low back and right buttock with oblique extension.  Myofascial exam:  Mild tenderness to palpation across lumbar paraspinous muscles.  Severe tenderness to palpation over right GTB and IT band    Imaging:  10/13/16 MRI L-spine  1.  Healing relatively severe superior endplate compression fracture of the L2 vertebral body with retropulsion of the posterior vertebral body cortex.  2.  L1-2 severe spinal stenosis from a combination of the L2 vertebral body compression fracture, mild disc bulge, and degenerative facet arthrosis.  3.  L2-3 minimal disc bulge and mild degenerative facet arthrosis.  4.  L3-4 minimal disc bulge.  There is mild anterior L3 subluxation due to degenerative facet arthrosis.  5.  L4-5 moderate spinal stenosis due to degenerative facet arthrosis.  6.  L5-S1 degenerative disc disease and degenerative facet arthrosis.  There is a cystic nodule anterior to the left L5-S1 facet joint which is most likely a synovial cyst and less likely a meningeal cyst.  The cystic nodule impinges the left-sided nerve roots within the left lateral recess of S1.  7.  Old moderate superior endplate compression fracture of the T11 vertebral body.    9/26/16 Xray L-spine  There is lower thoracic and upper lumbar scoliosis convex to the right.  The scoliotic angle measures 5 degrees from the superior endplate of T12 to the inferior endplate of L4.  There is a superior endplate compression fracture of the T11 vertebral body resulting in a wedge deformity and approximately 30 % loss of vertebral body height.  There is a relatively  severe superior endplate compression fracture of the L2 vertebral body resulting in a wedge deformity and approximately 70 % loss of vertebral body height.  There is a superior endplate compression fracture of the L4 vertebral body resulting in a wedge deformity and approximately 30 % loss of vertebral body height.  There is degenerative facet arthrosis at the L5-S1 level resulting in grade 2 anterolisthesis of L5 over distance of approximately 8 mm.  There is degenerative disc disease at L5-S1 level with disc space narrowing.    3/18/21 Xray L-spine:  An anterolisthesis is noted of 1.5 cm of the L5 on S1 vertebral body.  Compression fracture is noted of the L2 vertebral body with 75% vertebral body height loss.  Compression fracture is noted at the L4 vertebral body with approximately 50% vertebral body height loss.  Atherosclerotic calcifications are noted within the aorta with aneurysmal dilatation suspected distally to approximately 3.5 cm.  This can be confirmed on the CT of 03/10/2020.  A mild dextrocurvature is noted to the thoracolumbar junction.    06/09/2023 CT lumbar spine  FINDINGS:  Bones: Redemonstrated chronic T11, L2, and L4 compression fracture deformities minimally involving the inferior endplate of T11, severe height loss centrally with biconcave deformities involving L2 with mild osseous retropulsion, and mild height loss involving the superior endplate of L4.  This is on a background of diffuse bony demineralization.  No acute or progressive fractures.  Multilevel chronic Schmorl's nodes and endplate centered osteophytes.     Alignment: Mild broad levocurvature.  Trace retrolisthesis of L2 on L3.     Disc levels:     T12-L1: Mild facet arthrosis.  No lateralizing disc herniation.  The osseous spinal canal and foramina are patent.  L1-L2: Severe disc height loss with endplate centered osteophyte production and mild osseous retropulsion with shallow disc bulging and moderate facet arthrosis  producing moderate narrowing of the spinal canal and left foramen.  The right foramen is minimally narrowed.  L2-L3: Mild to moderate degenerative disc height loss with shallow disc bulging and osteophyte production as well as moderate bilateral facet arthrosis with ligamentum flavum thickening producing mild to moderate narrowing of the spinal canal and mild bilateral foraminal narrowing.  L3-L4: Mild degenerative disc height loss with shallow disc bulging and osteophytic endplate spurring and moderate facet arthrosis producing moderate narrowing of the spinal canal asymmetric to the right.  Mild right foraminal narrowing.  The left foramen is patent.  L4-L5: Shallow disc bulging and severe right/moderate left-sided facet arthrosis contributing to moderate to severe narrowing of the spinal canal and mild-to-moderate bilateral foraminal narrowing.  L5-S1: Severe disc height loss with shallow disc bulging and osteophytic endplate spurring as well as moderate to severe bilateral facet arthrosis.  Redemonstrated large left-sided synovial cyst which is difficult to completely evaluate but measures at least 14 mm as seen on the prior MRI from 2016 asymmetrically encroaching the left subarticular recess.  Overall mild spinal canal narrowing centrally.  Osseous endplate spurring contributes to mild-to-moderate right and moderate to severe left foraminal narrowing.       Assessment:  The patient is a 76-year-old woman with a history of COPD, multiple lumbar compression fractures, anxiety who presents in referral from Dr. Kline for right leg pain.     1. Greater trochanteric bursitis of right hip        2. Right leg pain  Ambulatory referral/consult to Physical Medicine Rehab      3. Lumbar radiculopathy        4. Dorsalgia, unspecified        5. Lumbar radiculopathy, chronic        6. It band syndrome, right                  Plan:  1. She found some relief with the epidural however not significant and not long-lasting.  2.  At this time I am unsure if her pain is originating from her lumbar spine.  She does have significant narrowing however she has severe tenderness to palpation over her right GTB and ID band.  She walks with a significant abnormal gait due to previous fracture of right tibia.  I believe her right leg is shorter than her left leg.  I think these factors contributing to her worsening right-sided leg pain.  3. For her continued pain I would like to refer her to PM&R for potentially a ultrasound-guided right GTB or ITB band injection.  4. She also reports some issues with digesting food with ultimately slow digestion.  Recommend she follow-up with GI for this.    The total time spent for evaluation and management on 08/04/2023 including reviewing separately obtained history, performing a medically appropriate exam and evaluation, documenting clinical information in the health record, independently interpreting results and communicating them to the patient/family/caregiver, and ordering medications/tests/procedures was between 30-39 minutes.

## 2023-08-05 NOTE — TELEPHONE ENCOUNTER
No care due was identified.  [facility] Embedded Care Due Messages. Reference number: [id number].   8/05/2023 8:08:52 AM CDT

## 2023-08-07 RX ORDER — CLOPIDOGREL BISULFATE 75 MG/1
TABLET ORAL
Qty: 90 TABLET | Refills: 2 | Status: SHIPPED | OUTPATIENT
Start: 2023-08-07 | End: 2023-11-07 | Stop reason: SDUPTHER

## 2023-08-07 NOTE — TELEPHONE ENCOUNTER
Refill Decision Note   Luda Peters  is requesting a refill authorization.  Brief Assessment and Rationale for Refill:  Approve     Medication Therapy Plan:         Comments:     Note composed:10:57 AM 08/07/2023             Appointments     Last Visit   5/5/2023 Nuzhat Kline MD   Next Visit   Visit date not found Nuzhat Kline MD

## 2023-08-31 ENCOUNTER — OFFICE VISIT (OUTPATIENT)
Dept: PHYSICAL MEDICINE AND REHAB | Facility: CLINIC | Age: 78
End: 2023-08-31
Payer: MEDICARE

## 2023-08-31 VITALS
WEIGHT: 168 LBS | SYSTOLIC BLOOD PRESSURE: 102 MMHG | BODY MASS INDEX: 30.91 KG/M2 | HEART RATE: 66 BPM | DIASTOLIC BLOOD PRESSURE: 54 MMHG | HEIGHT: 62 IN

## 2023-08-31 DIAGNOSIS — M79.604 RIGHT LEG PAIN: ICD-10-CM

## 2023-08-31 DIAGNOSIS — M70.61 GREATER TROCHANTERIC BURSITIS OF RIGHT HIP: Primary | ICD-10-CM

## 2023-08-31 PROCEDURE — 3074F SYST BP LT 130 MM HG: CPT | Mod: CPTII,S$GLB,, | Performed by: PHYSICAL MEDICINE & REHABILITATION

## 2023-08-31 PROCEDURE — 1125F AMNT PAIN NOTED PAIN PRSNT: CPT | Mod: CPTII,S$GLB,, | Performed by: PHYSICAL MEDICINE & REHABILITATION

## 2023-08-31 PROCEDURE — 3074F PR MOST RECENT SYSTOLIC BLOOD PRESSURE < 130 MM HG: ICD-10-PCS | Mod: CPTII,S$GLB,, | Performed by: PHYSICAL MEDICINE & REHABILITATION

## 2023-08-31 PROCEDURE — 99999 PR PBB SHADOW E&M-EST. PATIENT-LVL IV: CPT | Mod: PBBFAC,,, | Performed by: PHYSICAL MEDICINE & REHABILITATION

## 2023-08-31 PROCEDURE — 1159F PR MEDICATION LIST DOCUMENTED IN MEDICAL RECORD: ICD-10-PCS | Mod: CPTII,S$GLB,, | Performed by: PHYSICAL MEDICINE & REHABILITATION

## 2023-08-31 PROCEDURE — 1101F PR PT FALLS ASSESS DOC 0-1 FALLS W/OUT INJ PAST YR: ICD-10-PCS | Mod: CPTII,S$GLB,, | Performed by: PHYSICAL MEDICINE & REHABILITATION

## 2023-08-31 PROCEDURE — 1125F PR PAIN SEVERITY QUANTIFIED, PAIN PRESENT: ICD-10-PCS | Mod: CPTII,S$GLB,, | Performed by: PHYSICAL MEDICINE & REHABILITATION

## 2023-08-31 PROCEDURE — 20551: ICD-10-PCS | Mod: S$GLB,,, | Performed by: PHYSICAL MEDICINE & REHABILITATION

## 2023-08-31 PROCEDURE — 1101F PT FALLS ASSESS-DOCD LE1/YR: CPT | Mod: CPTII,S$GLB,, | Performed by: PHYSICAL MEDICINE & REHABILITATION

## 2023-08-31 PROCEDURE — 99999 PR PBB SHADOW E&M-EST. PATIENT-LVL IV: ICD-10-PCS | Mod: PBBFAC,,, | Performed by: PHYSICAL MEDICINE & REHABILITATION

## 2023-08-31 PROCEDURE — 3288F FALL RISK ASSESSMENT DOCD: CPT | Mod: CPTII,S$GLB,, | Performed by: PHYSICAL MEDICINE & REHABILITATION

## 2023-08-31 PROCEDURE — 1159F MED LIST DOCD IN RCRD: CPT | Mod: CPTII,S$GLB,, | Performed by: PHYSICAL MEDICINE & REHABILITATION

## 2023-08-31 PROCEDURE — 99204 PR OFFICE/OUTPT VISIT, NEW, LEVL IV, 45-59 MIN: ICD-10-PCS | Mod: 25,S$GLB,, | Performed by: PHYSICAL MEDICINE & REHABILITATION

## 2023-08-31 PROCEDURE — 76942 ECHO GUIDE FOR BIOPSY: CPT | Mod: S$GLB,,, | Performed by: PHYSICAL MEDICINE & REHABILITATION

## 2023-08-31 PROCEDURE — 76942: ICD-10-PCS | Mod: S$GLB,,, | Performed by: PHYSICAL MEDICINE & REHABILITATION

## 2023-08-31 PROCEDURE — 3288F PR FALLS RISK ASSESSMENT DOCUMENTED: ICD-10-PCS | Mod: CPTII,S$GLB,, | Performed by: PHYSICAL MEDICINE & REHABILITATION

## 2023-08-31 PROCEDURE — 20551 NJX 1 TENDON ORIGIN/INSJ: CPT | Mod: S$GLB,,, | Performed by: PHYSICAL MEDICINE & REHABILITATION

## 2023-08-31 PROCEDURE — 3078F DIAST BP <80 MM HG: CPT | Mod: CPTII,S$GLB,, | Performed by: PHYSICAL MEDICINE & REHABILITATION

## 2023-08-31 PROCEDURE — 3078F PR MOST RECENT DIASTOLIC BLOOD PRESSURE < 80 MM HG: ICD-10-PCS | Mod: CPTII,S$GLB,, | Performed by: PHYSICAL MEDICINE & REHABILITATION

## 2023-08-31 PROCEDURE — 99204 OFFICE O/P NEW MOD 45 MIN: CPT | Mod: 25,S$GLB,, | Performed by: PHYSICAL MEDICINE & REHABILITATION

## 2023-08-31 PROCEDURE — 1160F RVW MEDS BY RX/DR IN RCRD: CPT | Mod: CPTII,S$GLB,, | Performed by: PHYSICAL MEDICINE & REHABILITATION

## 2023-08-31 PROCEDURE — 1160F PR REVIEW ALL MEDS BY PRESCRIBER/CLIN PHARMACIST DOCUMENTED: ICD-10-PCS | Mod: CPTII,S$GLB,, | Performed by: PHYSICAL MEDICINE & REHABILITATION

## 2023-08-31 RX ORDER — LIDOCAINE HYDROCHLORIDE 10 MG/ML
4 INJECTION INFILTRATION; PERINEURAL
Status: DISCONTINUED | OUTPATIENT
Start: 2023-08-31 | End: 2023-08-31 | Stop reason: HOSPADM

## 2023-08-31 RX ORDER — ACETAMINOPHEN 500 MG
500 TABLET ORAL DAILY
COMMUNITY

## 2023-08-31 RX ORDER — DEXTROMETHORPHAN HYDROBROMIDE, GUAIFENESIN 5; 100 MG/5ML; MG/5ML
1300 LIQUID ORAL 2 TIMES DAILY
COMMUNITY

## 2023-08-31 RX ORDER — BETAMETHASONE SODIUM PHOSPHATE AND BETAMETHASONE ACETATE 3; 3 MG/ML; MG/ML
6 INJECTION, SUSPENSION INTRA-ARTICULAR; INTRALESIONAL; INTRAMUSCULAR; SOFT TISSUE
Status: DISCONTINUED | OUTPATIENT
Start: 2023-08-31 | End: 2023-08-31 | Stop reason: HOSPADM

## 2023-08-31 RX ADMIN — BETAMETHASONE SODIUM PHOSPHATE AND BETAMETHASONE ACETATE 6 MG: 3; 3 INJECTION, SUSPENSION INTRA-ARTICULAR; INTRALESIONAL; INTRAMUSCULAR; SOFT TISSUE at 10:08

## 2023-08-31 RX ADMIN — LIDOCAINE HYDROCHLORIDE 4 ML: 10 INJECTION INFILTRATION; PERINEURAL at 10:08

## 2023-08-31 NOTE — PROGRESS NOTES
[facility]    Consulting Provider: Fabrizio Leong  PCP: Nuzhat Kline MD    CHIEF COMPLAINT:   Chief Complaint   Patient presents with    Pain     Right buttock and leg pain.       HISTORY OF PRESENT ILLNESS: Luda Peters is a 78 y.o. female who presents to me for evaluation and management of right hip/leg pain.     Presents with daughter/caretaker.     Referred by Pain provider for considerations for management of right GTB and ITB pain.     Today reports, chronic right sided hip and leg pain. Significant pain to the outside of her buttock/hip area. Worse with ambulation. Arrives in wheelchair today due to her pain. Unable to lie on her right side. Requesting injection today.     Medications: voltaren gel, gabapentin  Injections:  - L5/S1 LIN 07/07/2023  - right L4/5 TFESI 08/17/2018  Therapies: HEP, no formal  Bracing: none  DME: none    Review of Systems   Constitutional: Negative for fever.   HENT: Negative for drooling.    Eyes: Negative for discharge.   Respiratory: Negative for choking.    Cardiovascular: Negative for chest pain.   Genitourinary: Negative for flank pain.   Skin: Negative for wound.   Allergic/Immunologic: Negative for immunocompromised state.   Neurological: Negative for tremors and syncope.   Psychiatric/Behavioral: Negative for behavioral problems.     Past Medical History:   Past Medical History:   Diagnosis Date    Anticoagulant long-term use     Anxiety 9/26/2016    Back pain     Closed fracture of second lumbar vertebra with routine healing 12/7/2015    COPD (chronic obstructive pulmonary disease) 9/26/2016    Emphysema of lung     Encounter for blood transfusion     Gastric ulcer     Hypertension        Past Surgical History:   Past Surgical History:   Procedure Laterality Date    EPIDURAL STEROID INJECTION INTO LUMBAR SPINE Right 7/7/2023    Procedure: Injection-steroid-epidural-lumbar L5/S1;  Surgeon: Casey Mccall MD;   Location: [facility] OR;  Service: Pain Management;  Laterality: Right;    ESOPHAGOGASTRODUODENOSCOPY N/A 3/22/2019    Procedure: EGD (ESOPHAGOGASTRODUODENOSCOPY);  Surgeon: Nomi Fajardo Jr., MD;  Location: [facility];  Service: Endoscopy;  Laterality: N/A;    HYSTERECTOMY      TRANSFORAMINAL EPIDURAL INJECTION OF STEROID Right 12/30/2021    Procedure: Injection,steroid,epidural,transforaminal approach L4/5;  Surgeon: Casey Mccall MD;  Location: [facility] OR;  Service: Pain Management;  Laterality: Right;       Family History:   Family History   Problem Relation Age of Onset    No Known Problems Son        Medications:   Current Outpatient Medications on File Prior to Visit   Medication Sig Dispense Refill    acetaminophen (TYLENOL) 500 MG tablet Take 500 mg by mouth Daily.      acetaminophen (TYLENOL) 650 MG TbSR Take 1,300 mg by mouth 2 (two) times daily.      albuterol (PROVENTIL/VENTOLIN HFA) 90 mcg/actuation inhaler Inhale 2 puffs into the lungs every 6 (six) hours as needed for Wheezing or Shortness of Breath. Rescue 3 g 6    allopurinoL (ZYLOPRIM) 100 MG tablet Take 1 tablet (100 mg total) by mouth once daily. 90 tablet 1    ARIPiprazole (ABILIFY) 2 MG Tab Take 1 tablet (2 mg total) by mouth once daily. 90 tablet 3    ascorbic acid, vitamin C, (VITAMIN C) 1000 MG tablet Take 1,000 mg by mouth daily as needed (immune support).      budesonide (PULMICORT) 0.5 mg/2 mL nebulizer solution Take 2 mLs (0.5 mg total) by nebulization 2 (two) times daily as needed (wheezing). USE 1 VIAL VIA NEBULIZER EVERY 12 HOURS 100 mL 2    clopidogreL (PLAVIX) 75 mg tablet TAKE 1 TABLET BY MOUTH EVERY DAY 90 tablet 2    diclofenac sodium (VOLTAREN) 1 % Gel APPLY 2 GRAMS TOPICALLY TO THE AFFECTED AREA TWICE DAILY 100 g 1    docusate sodium (COLACE) 100 MG capsule Take 100 mg by mouth 2 (two) times daily.      EScitalopram oxalate (LEXAPRO) 20 MG tablet Take 1 tablet (20 mg total) by mouth every evening. 90 tablet 3     fluticasone-umeclidin-vilanter (TRELEGY ELLIPTA) 100-62.5-25 mcg DsDv Inhale 1 puff into the lungs once daily. 60 each 3    furosemide (LASIX) 40 MG tablet TAKE 1 TABLET BY MOUTH TWICE DAILY 180 tablet 1    gabapentin (NEURONTIN) 600 MG tablet TAKE 1 TABLET(600 MG) BY MOUTH TWICE DAILY 180 tablet 1    metoprolol tartrate (LOPRESSOR) 25 MG tablet TAKE 1 TABLET BY MOUTH TWICE DAILY 180 tablet 3    multivitamin (THERAGRAN) per tablet Take 1 tablet by mouth daily as needed (immune support).      naproxen sodium (ANAPROX) 220 MG tablet Take 220 mg by mouth Daily.      nortriptyline (PAMELOR) 25 MG capsule Take 1 capsule (25 mg total) by mouth every evening. 90 capsule 3    pantoprazole (PROTONIX) 40 MG tablet TAKE 1 TABLET BY MOUTH TWICE DAILY 180 tablet 1    zinc acetate 50 mg (zinc) Cap Take 1 tablet by mouth daily as needed (immune support).      albuterol-ipratropium (DUO-NEB) 2.5 mg-0.5 mg/3 mL nebulizer solution Take 3 mLs by nebulization every 6 (six) hours as needed for Wheezing. Rescue 75 mL 5     No current facility-administered medications on file prior to visit.       Allergies:   Review of patient's allergies indicates:   Allergen Reactions    Iodine and iodide containing products        Social History:   Social History     Socioeconomic History    Marital status:    Tobacco Use    Smoking status: Former     Current packs/day: 0.00     Types: Cigarettes     Quit date: 10/26/2014     Years since quittin.8    Smokeless tobacco: Never   Substance and Sexual Activity    Alcohol use: Yes     Alcohol/week: 0.0 standard drinks of alcohol     Comment: Social    Drug use: No   Social History Narrative    Originally from [place].     Social Determinants of Health     Financial Resource Strain: Unknown (2023)    Overall Financial Resource Strain (CARDIA)     Difficulty of Paying Living Expenses: Patient refused   Food Insecurity: Unknown (2023)    Hunger Vital Sign     Worried About Running Out of  "Food in the Last Year: Patient refused     Ran Out of Food in the Last Year: Patient refused   Transportation Needs: No Transportation Needs (5/5/2023)    PRAPARE - Transportation     Lack of Transportation (Medical): No     Lack of Transportation (Non-Medical): No   Physical Activity: Insufficiently Active (5/5/2023)    Exercise Vital Sign     Days of Exercise per Week: 3 days     Minutes of Exercise per Session: 20 min   Stress: Stress Concern Present (7/8/2022)    [place] [employer] - Occupational Stress Questionnaire     Feeling of Stress : Rather much   Social Connections: Unknown (5/5/2023)    Social Connection and Isolation Panel [NHANES]     Frequency of Communication with Friends and Family: More than three times a week     Frequency of Social Gatherings with Friends and Family: More than three times a week     Active Member of Clubs or Organizations: Patient refused     Attends Club or Organization Meetings: Patient refused     Marital Status:    Housing Stability: Unknown (5/5/2023)    Housing Stability Vital Sign     Unable to Pay for Housing in the Last Year: Patient refused     Unstable Housing in the Last Year: No       PHYSICAL EXAMINATION:   Vitals:    08/31/23 1019   BP: (!) 102/54   BP Location: Right arm   Patient Position: Sitting   BP Method: Medium (Automatic)   Pulse: 66   Weight: 76.2 kg (167 lb 15.9 oz)   Height: 5' 2" (1.575 m)     Constitutional: No apparent distress. Pleasant.  HENT: Trachea midline.  Head: Normocephalic and atraumatic.   Eyes: Right eye exhibits no discharge. Left eye exhibits no discharge. No scleral icterus.   CV: Well perfused.   Pulmonary/Chest: Effort normal. No respiratory distress.   Abdominal: There is no guarding.   Neurological: Awake, alert and cooperative.  SKIN: Intact no apparent lesions, cut, ulcers or abrasions  EXT:  No cyanosis, clubbing, or edema.  SENSORY: Intact to light touch in the bilateral lower " extemities.  MUSCKULOSKELETAL:   Muscle Strength:(0-5)                             Right     Left  Hip Flexors                5  5  Hip Abductor              4  4  Hip Adductor              5  5  Knee Extensors          5  5  Knee Flexors              5  5  Ankle Dorsiflex           5  5  Ankle Planterflex        5  5  EHL                            5  5    Reflexes: (0-4+/4)   Right     Left  Patellar(L4)  2+  2+  Ankle(S1)  2+  2+    INSPECTION:         Right     Left   Localized/Generalized swelling:   -  -  Muscle contours normal and symmetrical: +  +  Erythema:      -  -  Gross deformity:    -  -    GAIT: smooth and symmetrical with equal arm swing    RANGE OF MOTION:           Right      Left  Lumbar Flexion:                    Full  Full   Lumbar Extension:                  Full  Full  Lumbar Lateral bending: Full  Full  Hip Internal Rotation:  Full  Full  Hip External Rotation:  Full  Full  Log Rolling:   Full  Full  Other:     TENDERNESS:                 Right      Left  Trochanteric bursa:     ++  -  Gluteal muscles:          +  -  SI Joints:           -  -  Axial lumbar loading:   -  -  Simulated trunk rotation: -  -     SPECIAL TESTS:         Right     Left  Seated SLR:       -  -  Jona's (VIBHA):  -  -  Lumbar Phalen's:  -  -  Piriformis stretch:  -  -  FAIR:    -  -  Ankle clonus:   -  -  Babinski:   -  -  Other:    Imaging  XR lumbar spine from 04/24/2023 with noted:   1. Chronic T11, L2, and L4 compression fractures on a background of diffuse bony demineralization.  No definite acute bony changes allowing for limited plain film radiography.  2. Chronic anterolisthesis of L5 on S1 similar to the prior exam.  3. Aortic aneurysm redemonstrated.    CT lumbar spine from 06/09/2023 with noted:   1. No acute bony process.  Chronic T11, L2, and L4 fractures on a background of multilevel spondylosis discussed above.  2. Aneurysmal dilatation of the abdominal aorta and prominent atherosclerotic  "changes.    Data Reviewed: X-ray    Supportive Actions: Independent visualization of images or test specimens.    ASSESSMENT:   1. Greater trochanteric bursitis of right hip    2. Right leg pain        PLAN:   1. Time was spent reviewing the above diagnosis in depth with Luda today, including acute management and rehabilitation.     2. We discussed options for management of her pain would be to consider injection of steroid. The use, benefits, risks, and expectations of all of these types of injections was discussed at length with her today. At this time, she elects to proceed with ultrasound guided right greater trochanteric bursa/gluteal tendon steroid injection. This procedure was completed today in clinic. Please see procedure note for further details.  We can repeat this every 3 months if appropriate.    RTC as needed.     This is a consult from Fabrizio Leong. Please see the "Communications" section of Epic to see how the consulting physician received the report of today's findings and recommendations. If it's an [facility] provider, it will be forwarded to his/her "in basket".  "

## 2023-08-31 NOTE — PROCEDURES
Tendon Origin: right greater trochanteric bursa/gluteal tendon    Date/Time: 8/31/2023 10:30 AM    Performed by: Lizz Neely,   Authorized by: Lizz Neely, DO    Consent Done?:  Yes (Verbal)  Timeout: prior to procedure the correct patient, procedure, and site was verified    Indications:  Diagnostic evaluation and pain  Site marked: the procedure site was marked    Timeout: prior to procedure the correct patient, procedure, and site was verified    Location:  Hip  Hip joint: Right GT bursa/glute tendon.  Prep: patient was prepped and draped in usual sterile fashion    Ultrasonic Guidance for Needle Placement?: Yes    Needle size:  22 G (spinal)  Approach:  Posterolateral  Medications:  6 mg betamethasone acetate-betamethasone sodium phosphate 6 mg/mL; 4 mL LIDOcaine HCL 10 mg/ml (1%) 10 mg/mL (1 %)  Patient tolerance:  Patient tolerated the procedure well with no immediate complications   Ultrasound guidance was used for correct needle placement, the images were saved will be uploaded to EMR.

## 2023-09-09 DIAGNOSIS — M25.561 ACUTE PAIN OF RIGHT KNEE: ICD-10-CM

## 2023-09-12 RX ORDER — DICLOFENAC SODIUM 10 MG/G
GEL TOPICAL
Qty: 100 G | Refills: 1 | Status: SHIPPED | OUTPATIENT
Start: 2023-09-12 | End: 2023-09-29 | Stop reason: SDUPTHER

## 2023-09-29 DIAGNOSIS — M25.561 ACUTE PAIN OF RIGHT KNEE: ICD-10-CM

## 2023-09-29 DIAGNOSIS — J43.2 CENTRILOBULAR EMPHYSEMA: Chronic | ICD-10-CM

## 2023-09-29 RX ORDER — DICLOFENAC SODIUM 10 MG/G
GEL TOPICAL
Qty: 100 G | Refills: 1 | Status: SHIPPED | OUTPATIENT
Start: 2023-09-29 | End: 2023-10-27

## 2023-09-29 NOTE — TELEPHONE ENCOUNTER
No care due was identified.  [facility] Embedded Care Due Messages. Reference number: [id number].   9/29/2023 11:01:15 AM CDT

## 2023-09-29 NOTE — TELEPHONE ENCOUNTER
----- Message from Princess Carlson sent at 9/29/2023 10:04 AM CDT -----  Regarding: Refill  Type:  RX Refill Request    Who Called: pt    Refill or New Rx:Refill    RX Name and Strength:fluticasone-umeclidin-vilanter (TRELEGY ELLIPTA) 100-62.5-25 mcg DsDv and diclofenac sodium (VOLTAREN) 1 % Gel    Preferred Pharmacy with phone number:  [facility] [address], [place] [address] [phone]    Local or Mail Order:Local    Ordering Provider:Elder    Would the patient rather a call back or a response via [facility]? Callback    Best Call Back Number:[phone]    Additional Information: Thanks

## 2023-09-30 NOTE — TELEPHONE ENCOUNTER
No care due was identified.  [facility] Embedded Care Due Messages. Reference number: [id number].   9/30/2023 6:05:11 PM CDT

## 2023-10-01 NOTE — TELEPHONE ENCOUNTER
Refill Routing Note   Medication(s) are not appropriate for processing by [facility] for the following reason(s):      Daily dosage OOP    [facility] action(s):  Route Care Due:  None identified          Appointments  past 12m or future 3m with PCP    Date Provider   Last Visit   5/5/2023 Nuzhat Kline MD   Next Visit   11/3/2023 Nuzhat Kline MD   ED visits in past 90 days: 0        Note composed:7:34 PM 09/30/2023

## 2023-10-09 ENCOUNTER — TELEPHONE (OUTPATIENT)
Dept: FAMILY MEDICINE | Facility: CLINIC | Age: 78
End: 2023-10-09
Payer: MEDICARE

## 2023-10-09 ENCOUNTER — HOSPITAL ENCOUNTER (OUTPATIENT)
Dept: RADIOLOGY | Facility: HOSPITAL | Age: 78
Discharge: HOME | End: 2023-10-09
Attending: FAMILY MEDICINE
Payer: MEDICARE

## 2023-10-09 ENCOUNTER — OFFICE VISIT (OUTPATIENT)
Dept: FAMILY MEDICINE | Facility: CLINIC | Age: 78
End: 2023-10-09
Payer: MEDICARE

## 2023-10-09 ENCOUNTER — TELEPHONE (OUTPATIENT)
Dept: FAMILY MEDICINE | Facility: CLINIC | Age: 78
End: 2023-10-09

## 2023-10-09 VITALS — DIASTOLIC BLOOD PRESSURE: 78 MMHG | SYSTOLIC BLOOD PRESSURE: 102 MMHG | RESPIRATION RATE: 16 BRPM

## 2023-10-09 DIAGNOSIS — M54.12 CERVICAL RADICULOPATHY: Primary | ICD-10-CM

## 2023-10-09 DIAGNOSIS — R07.9 CHEST PAIN, UNSPECIFIED TYPE: ICD-10-CM

## 2023-10-09 DIAGNOSIS — R73.03 PREDIABETES: ICD-10-CM

## 2023-10-09 DIAGNOSIS — M54.12 CERVICAL RADICULOPATHY: ICD-10-CM

## 2023-10-09 PROCEDURE — 71046 X-RAY EXAM CHEST 2 VIEWS: CPT | Mod: 26,,, | Performed by: RADIOLOGY

## 2023-10-09 PROCEDURE — 1160F RVW MEDS BY RX/DR IN RCRD: CPT | Mod: CPTII,95,, | Performed by: FAMILY MEDICINE

## 2023-10-09 PROCEDURE — 71046 XR CHEST PA AND LATERAL: ICD-10-PCS | Mod: 26,,, | Performed by: RADIOLOGY

## 2023-10-09 PROCEDURE — 1159F MED LIST DOCD IN RCRD: CPT | Mod: CPTII,95,, | Performed by: FAMILY MEDICINE

## 2023-10-09 PROCEDURE — 3074F PR MOST RECENT SYSTOLIC BLOOD PRESSURE < 130 MM HG: ICD-10-PCS | Mod: CPTII,95,, | Performed by: FAMILY MEDICINE

## 2023-10-09 PROCEDURE — 3078F PR MOST RECENT DIASTOLIC BLOOD PRESSURE < 80 MM HG: ICD-10-PCS | Mod: CPTII,95,, | Performed by: FAMILY MEDICINE

## 2023-10-09 PROCEDURE — 3074F SYST BP LT 130 MM HG: CPT | Mod: CPTII,95,, | Performed by: FAMILY MEDICINE

## 2023-10-09 PROCEDURE — 3078F DIAST BP <80 MM HG: CPT | Mod: CPTII,95,, | Performed by: FAMILY MEDICINE

## 2023-10-09 PROCEDURE — 1160F PR REVIEW ALL MEDS BY PRESCRIBER/CLIN PHARMACIST DOCUMENTED: ICD-10-PCS | Mod: CPTII,95,, | Performed by: FAMILY MEDICINE

## 2023-10-09 PROCEDURE — 99214 OFFICE O/P EST MOD 30 MIN: CPT | Mod: 95,,, | Performed by: FAMILY MEDICINE

## 2023-10-09 PROCEDURE — 1159F PR MEDICATION LIST DOCUMENTED IN MEDICAL RECORD: ICD-10-PCS | Mod: CPTII,95,, | Performed by: FAMILY MEDICINE

## 2023-10-09 PROCEDURE — 71046 X-RAY EXAM CHEST 2 VIEWS: CPT | Mod: TC,PN

## 2023-10-09 PROCEDURE — 99214 PR OFFICE/OUTPT VISIT, EST, LEVL IV, 30-39 MIN: ICD-10-PCS | Mod: 95,,, | Performed by: FAMILY MEDICINE

## 2023-10-09 RX ORDER — TIZANIDINE 4 MG/1
4 TABLET ORAL EVERY 8 HOURS PRN
Qty: 30 TABLET | Refills: 0 | Status: SHIPPED | OUTPATIENT
Start: 2023-10-09 | End: 2023-10-19

## 2023-10-09 RX ORDER — METHYLPREDNISOLONE 4 MG/1
TABLET ORAL
Qty: 21 EACH | Refills: 0 | Status: SHIPPED | OUTPATIENT
Start: 2023-10-09 | End: 2023-10-30

## 2023-10-09 NOTE — TELEPHONE ENCOUNTER
Pt daughter reports pt has been having neck / shoulder pain on left side since Wednesday . BP has been normal . Pt has been using Salon pas patch with no relief, OTC Tylenol . VV scheduled this afternoon , pt daughter aware .--lp

## 2023-10-09 NOTE — PROGRESS NOTES
Subjective:       Patient ID: Luda Peters is a 78 y.o. female.    Chief Complaint: No chief complaint on file.      The patient location is: [place]  The chief complaint leading to consultation is: neck pain  Visit type: Virtual visit with synchronous audio and video  Total time spent with patient: 10mins  Each patient to whom he or she provides medical services by telemedicine is:  (1) informed of the relationship between the physician and patient and the respective role of any other health care provider with respect to management of the patient; and (2) notified that he or she may decline to receive medical services by telemedicine and may withdraw from such care at any time.    Notes: Patient seen for sx review.   She reports pain on her L face, neck, L shoulder.  She notes acute onset while she was visiting her son and assumed that it was from sleeping funny. They're using topicals and lidocaine patches which help a little.  She declined an ER eval.    The lidocaine patch is making it numb, but the pain hasn't changed a whole lot from onset.   No fluid retention. Home BP readings are normal. Pulse ox is 78%-83%, but thi sis consistent with home readings. Some pain with movement, but no weakness. She notes that the skin feels different from elbow to shoulder.   She sleeps on her L side almost exclusively.     Review of Systems   Constitutional:  Negative for fatigue and unexpected weight change.   HENT:  Negative for congestion and sore throat.    Respiratory:  Positive for cough and shortness of breath.    Cardiovascular:  Negative for palpitations and leg swelling.   Gastrointestinal:  Negative for abdominal pain, constipation and diarrhea.   Genitourinary:  Positive for frequency. Negative for difficulty urinating.   Musculoskeletal:  Positive for arthralgias, gait problem (needs walker or wheelchair after prolonged time on feet), myalgias and neck pain.   Neurological:  Negative for dizziness and  light-headedness.   Psychiatric/Behavioral:  Positive for decreased concentration. Negative for sleep disturbance (more comfortable in her recliner of late). The patient is nervous/anxious.        Objective:        Physical Exam  Vitals and nursing note reviewed.   Constitutional:       General: She is not in acute distress.     Appearance: Normal appearance. She is well-developed.   HENT:      Head: Normocephalic and atraumatic.   Eyes:      General: No scleral icterus.        Right eye: No discharge.         Left eye: No discharge.      Conjunctiva/sclera: Conjunctivae normal.   Pulmonary:      Effort: Pulmonary effort is normal. No respiratory distress.   Skin:     General: Skin is warm and dry.   Neurological:      General: No focal deficit present.      Mental Status: She is alert and oriented to person, place, and time.   Psychiatric:         Mood and Affect: Mood normal.         Behavior: Behavior normal.             Assessment:   Cervical radiculopathy  -     CT Head Without Contrast; Future; Expected date: 10/09/2023  -     TROPONIN I; Future; Expected date: 10/09/2023  -     CBC Without Differential; Future; Expected date: 10/09/2023  -     Comprehensive Metabolic Panel; Future; Expected date: 10/09/2023  -     X-Ray Chest PA And Lateral; Future; Expected date: 10/09/2023    Prediabetes  -     Hemoglobin A1C; Future; Expected date: 10/09/2023    Chest pain, unspecified type  -     EKG 12-lead; Future    Other orders  -     tiZANidine (ZANAFLEX) 4 MG tablet; Take 1 tablet (4 mg total) by mouth every 8 (eight) hours as needed (muscle spasm).  Dispense: 30 tablet; Refill: 0  -     methylPREDNISolone (MEDROL DOSEPACK) 4 mg tablet; use as directed  Dispense: 21 each; Refill: 0    Sx onset several days ago, appears stable in acuity since onset. Sx management with topicals only. Suspicious for radicular sx given spread from neck to shoulder and to mid back. Will need r/o given L chest wall and facial sx. Check CT  head and troponin with labs. In interim, ok to use topicals, will send steroid pack and tizanidine.     Patient aware of limitations to assessment and exam of telemedicine. Deemed appropriate at this time given current covid-19 concerns.

## 2023-10-09 NOTE — TELEPHONE ENCOUNTER
----- Message from Demetria Fuller sent at 10/9/2023  7:05 AM CDT -----  Type:  Same Day Appointment Request    Caller is requesting a same day appointment.  Caller declined first available appointment listed below.      Name of Caller:  pt daughter, Gabrielle  When is the first available appointment?  none  Symptoms:  stiffness in neck/coughing  Best Call Back Number:  [phone]  Additional Information:   please call and advise--thank you

## 2023-10-10 RX ORDER — PANTOPRAZOLE SODIUM 40 MG/1
40 TABLET, DELAYED RELEASE ORAL 2 TIMES DAILY
Qty: 180 TABLET | Refills: 1 | Status: SHIPPED | OUTPATIENT
Start: 2023-10-10

## 2023-10-13 ENCOUNTER — HOSPITAL ENCOUNTER (OUTPATIENT)
Dept: RADIOLOGY | Facility: HOSPITAL | Age: 78
Discharge: HOME | End: 2023-10-13
Attending: FAMILY MEDICINE
Payer: MEDICARE

## 2023-10-13 ENCOUNTER — PATIENT MESSAGE (OUTPATIENT)
Dept: FAMILY MEDICINE | Facility: CLINIC | Age: 78
End: 2023-10-13
Payer: MEDICARE

## 2023-10-13 DIAGNOSIS — M54.12 CERVICAL RADICULOPATHY: ICD-10-CM

## 2023-10-13 DIAGNOSIS — Z86.73 CEREBRAL INFARCTION, CHRONIC: Primary | ICD-10-CM

## 2023-10-13 DIAGNOSIS — I70.0 ATHEROSCLEROSIS OF AORTA: ICD-10-CM

## 2023-10-13 PROCEDURE — 70450 CT HEAD WITHOUT CONTRAST: ICD-10-PCS | Mod: 26,,, | Performed by: RADIOLOGY

## 2023-10-13 PROCEDURE — 70450 CT HEAD/BRAIN W/O DYE: CPT | Mod: TC,PO

## 2023-10-13 PROCEDURE — 70450 CT HEAD/BRAIN W/O DYE: CPT | Mod: 26,,, | Performed by: RADIOLOGY

## 2023-10-13 RX ORDER — ATORVASTATIN CALCIUM 20 MG/1
20 TABLET, FILM COATED ORAL DAILY
Qty: 90 TABLET | Refills: 3 | Status: SHIPPED | OUTPATIENT
Start: 2023-10-13 | End: 2024-10-12

## 2023-10-13 NOTE — TELEPHONE ENCOUNTER
Call placed to daughter Gabrielle for review.   Has been on plavix since late 2018 following prolonged hospitalization and rehab stay. No record of statin previously. CT head showed chronic infarcts, no acute changes.   She had an MRI earlier this year (lower back) at [facility] and required sedation.     The 10-year ASCVD risk score (Leny ALY, et al., 2019) is: 14.3%    Values used to calculate the score:      Age: 78 years      Sex: Female      Is Non- : No      Diabetic: No      Tobacco smoker: No      Systolic Blood Pressure: 102 mmHg      Is BP treated: No      HDL Cholesterol: 47 mg/dL      Total Cholesterol: 174 mg/dL

## 2023-10-18 NOTE — TELEPHONE ENCOUNTER
Refill Routing Note   Medication(s) are not appropriate for processing by [facility] for the following reason(s):      Required labs outdated    [facility] action(s):  Defer Care Due:  None identified            Appointments  past 12m or future 3m with PCP    Date Provider   Last Visit   10/9/2023 Nuzhat Kline MD   Next Visit   11/3/2023 Nuzhat Kline MD   ED visits in past 90 days: 0        Note composed:2:42 PM 10/18/2023

## 2023-10-18 NOTE — TELEPHONE ENCOUNTER
No care due was identified.  [facility] Embedded Care Due Messages. Reference number: [id number].   10/18/2023 12:44:55 PM CDT

## 2023-10-19 RX ORDER — ALLOPURINOL 100 MG/1
100 TABLET ORAL
Qty: 90 TABLET | Refills: 1 | Status: SHIPPED | OUTPATIENT
Start: 2023-10-19

## 2023-10-24 RX ORDER — FUROSEMIDE 40 MG/1
TABLET ORAL
Qty: 180 TABLET | Refills: 3 | Status: SHIPPED | OUTPATIENT
Start: 2023-10-24

## 2023-10-24 NOTE — TELEPHONE ENCOUNTER
No care due was identified.  [facility] Embedded Care Due Messages. Reference number: [id number].   10/24/2023 12:45:48 PM CDT

## 2023-10-25 DIAGNOSIS — M25.561 ACUTE PAIN OF RIGHT KNEE: ICD-10-CM

## 2023-10-25 NOTE — TELEPHONE ENCOUNTER
Refill Decision Note   Luda Carlsoncarlos  is requesting a refill authorization.  Brief Assessment and Rationale for Refill:  Approve     Medication Therapy Plan:         Comments:     Note composed:7:33 PM 10/24/2023

## 2023-10-25 NOTE — TELEPHONE ENCOUNTER
No care due was identified.  [facility] Embedded Care Due Messages. Reference number: [id number].   10/25/2023 6:34:55 PM CDT

## 2023-10-27 ENCOUNTER — TELEPHONE (OUTPATIENT)
Dept: FAMILY MEDICINE | Facility: CLINIC | Age: 78
End: 2023-10-27
Payer: MEDICARE

## 2023-10-27 RX ORDER — DICLOFENAC SODIUM 10 MG/G
GEL TOPICAL
Qty: 100 G | Refills: 1 | Status: SHIPPED | OUTPATIENT
Start: 2023-10-27 | End: 2023-12-01

## 2023-10-27 NOTE — TELEPHONE ENCOUNTER
----- Message from Jeaneth Cole sent at 10/27/2023 12:51 PM CDT -----  Type:  RX Refill Request    Who Called:  Pt    Refill or New Rx:  refill    RX Name and Strength:  diclofenac sodium (VOLTAREN) 1 % Gel    How is the patient currently taking it? (ex. 1XDay):  as directed    Is this a 30 day or 90 day RX:  90    Preferred Pharmacy with phone number:    [id number] [address] [address]  Phone: [phone] Fax: [phone]    Local or Mail Order:  Local    Ordering Provider:  Dr Kline    Would the patient rather a call back or a response via [facility]?  Call back    Best Call Back Number:  [phone]    Additional Information:  Pt is almost out and wont have enough through the weekend.  Please call back to advise. Thanks!

## 2023-10-27 NOTE — TELEPHONE ENCOUNTER
Spoke w/ pt and dtr. She states the voltaren gel is only lasting 1 wk. Based on qty and sig, it should last 25 days. She also takes Tylenol arthritis)650mg BID and 500 QPM). Pain mgmt d/c'd Aleve. She is Ok waiting for her appt to discuss. She will get it OTC if needed before her appt.

## 2023-11-03 ENCOUNTER — OFFICE VISIT (OUTPATIENT)
Dept: FAMILY MEDICINE | Facility: CLINIC | Age: 78
End: 2023-11-03
Payer: MEDICARE

## 2023-11-03 VITALS
SYSTOLIC BLOOD PRESSURE: 104 MMHG | BODY MASS INDEX: 30.59 KG/M2 | HEIGHT: 62 IN | WEIGHT: 166.25 LBS | HEART RATE: 68 BPM | OXYGEN SATURATION: 82 % | DIASTOLIC BLOOD PRESSURE: 60 MMHG

## 2023-11-03 DIAGNOSIS — J44.1 COPD WITH EXACERBATION: Primary | ICD-10-CM

## 2023-11-03 DIAGNOSIS — M81.0 OSTEOPOROSIS, UNSPECIFIED OSTEOPOROSIS TYPE, UNSPECIFIED PATHOLOGICAL FRACTURE PRESENCE: ICD-10-CM

## 2023-11-03 DIAGNOSIS — M54.31 SCIATICA OF RIGHT SIDE: ICD-10-CM

## 2023-11-03 DIAGNOSIS — J43.2 CENTRILOBULAR EMPHYSEMA: Chronic | ICD-10-CM

## 2023-11-03 PROCEDURE — 99999 PR PBB SHADOW E&M-EST. PATIENT-LVL IV: ICD-10-PCS | Mod: PBBFAC,,, | Performed by: FAMILY MEDICINE

## 2023-11-03 PROCEDURE — 1159F MED LIST DOCD IN RCRD: CPT | Mod: CPTII,S$GLB,, | Performed by: FAMILY MEDICINE

## 2023-11-03 PROCEDURE — 3074F SYST BP LT 130 MM HG: CPT | Mod: CPTII,S$GLB,, | Performed by: FAMILY MEDICINE

## 2023-11-03 PROCEDURE — 1101F PT FALLS ASSESS-DOCD LE1/YR: CPT | Mod: CPTII,S$GLB,, | Performed by: FAMILY MEDICINE

## 2023-11-03 PROCEDURE — 1125F AMNT PAIN NOTED PAIN PRSNT: CPT | Mod: CPTII,S$GLB,, | Performed by: FAMILY MEDICINE

## 2023-11-03 PROCEDURE — 99215 OFFICE O/P EST HI 40 MIN: CPT | Mod: 25,S$GLB,, | Performed by: FAMILY MEDICINE

## 2023-11-03 PROCEDURE — 3078F DIAST BP <80 MM HG: CPT | Mod: CPTII,S$GLB,, | Performed by: FAMILY MEDICINE

## 2023-11-03 PROCEDURE — 96372 PR INJECTION,THERAP/PROPH/DIAG2ST, IM OR SUBCUT: ICD-10-PCS | Mod: S$GLB,,, | Performed by: FAMILY MEDICINE

## 2023-11-03 PROCEDURE — 1125F PR PAIN SEVERITY QUANTIFIED, PAIN PRESENT: ICD-10-PCS | Mod: CPTII,S$GLB,, | Performed by: FAMILY MEDICINE

## 2023-11-03 PROCEDURE — 99999 PR PBB SHADOW E&M-EST. PATIENT-LVL IV: CPT | Mod: PBBFAC,,, | Performed by: FAMILY MEDICINE

## 2023-11-03 PROCEDURE — 3288F FALL RISK ASSESSMENT DOCD: CPT | Mod: CPTII,S$GLB,, | Performed by: FAMILY MEDICINE

## 2023-11-03 PROCEDURE — 1160F RVW MEDS BY RX/DR IN RCRD: CPT | Mod: CPTII,S$GLB,, | Performed by: FAMILY MEDICINE

## 2023-11-03 PROCEDURE — 3288F PR FALLS RISK ASSESSMENT DOCUMENTED: ICD-10-PCS | Mod: CPTII,S$GLB,, | Performed by: FAMILY MEDICINE

## 2023-11-03 PROCEDURE — 3074F PR MOST RECENT SYSTOLIC BLOOD PRESSURE < 130 MM HG: ICD-10-PCS | Mod: CPTII,S$GLB,, | Performed by: FAMILY MEDICINE

## 2023-11-03 PROCEDURE — 96372 THER/PROPH/DIAG INJ SC/IM: CPT | Mod: S$GLB,,, | Performed by: FAMILY MEDICINE

## 2023-11-03 PROCEDURE — 3078F PR MOST RECENT DIASTOLIC BLOOD PRESSURE < 80 MM HG: ICD-10-PCS | Mod: CPTII,S$GLB,, | Performed by: FAMILY MEDICINE

## 2023-11-03 PROCEDURE — 1160F PR REVIEW ALL MEDS BY PRESCRIBER/CLIN PHARMACIST DOCUMENTED: ICD-10-PCS | Mod: CPTII,S$GLB,, | Performed by: FAMILY MEDICINE

## 2023-11-03 PROCEDURE — 99215 PR OFFICE/OUTPT VISIT, EST, LEVL V, 40-54 MIN: ICD-10-PCS | Mod: 25,S$GLB,, | Performed by: FAMILY MEDICINE

## 2023-11-03 PROCEDURE — 1159F PR MEDICATION LIST DOCUMENTED IN MEDICAL RECORD: ICD-10-PCS | Mod: CPTII,S$GLB,, | Performed by: FAMILY MEDICINE

## 2023-11-03 PROCEDURE — 1101F PR PT FALLS ASSESS DOC 0-1 FALLS W/OUT INJ PAST YR: ICD-10-PCS | Mod: CPTII,S$GLB,, | Performed by: FAMILY MEDICINE

## 2023-11-03 RX ORDER — IPRATROPIUM BROMIDE AND ALBUTEROL SULFATE 2.5; .5 MG/3ML; MG/3ML
3 SOLUTION RESPIRATORY (INHALATION) EVERY 6 HOURS PRN
Qty: 75 ML | Refills: 5 | Status: SHIPPED | OUTPATIENT
Start: 2023-11-03 | End: 2024-11-02

## 2023-11-03 RX ORDER — METHYLPREDNISOLONE ACETATE 40 MG/ML
40 INJECTION, SUSPENSION INTRA-ARTICULAR; INTRALESIONAL; INTRAMUSCULAR; SOFT TISSUE
Status: COMPLETED | OUTPATIENT
Start: 2023-11-03 | End: 2023-11-03

## 2023-11-03 RX ORDER — DOXYCYCLINE HYCLATE 100 MG
100 TABLET ORAL 2 TIMES DAILY
Qty: 14 TABLET | Refills: 0 | Status: SHIPPED | OUTPATIENT
Start: 2023-11-03 | End: 2024-03-04

## 2023-11-03 RX ORDER — BUDESONIDE 0.5 MG/2ML
0.5 INHALANT ORAL 2 TIMES DAILY PRN
Qty: 100 ML | Refills: 2 | Status: SHIPPED | OUTPATIENT
Start: 2023-11-03

## 2023-11-03 RX ORDER — CEFTRIAXONE 1 G/1
1 INJECTION, POWDER, FOR SOLUTION INTRAMUSCULAR; INTRAVENOUS
Status: COMPLETED | OUTPATIENT
Start: 2023-11-03 | End: 2023-11-03

## 2023-11-03 RX ORDER — ALBUTEROL SULFATE 90 UG/1
2 AEROSOL, METERED RESPIRATORY (INHALATION) EVERY 6 HOURS PRN
Qty: 3 G | Refills: 6 | Status: SHIPPED | OUTPATIENT
Start: 2023-11-03

## 2023-11-03 RX ORDER — METHYLPREDNISOLONE 4 MG/1
TABLET ORAL
Qty: 21 EACH | Refills: 0 | Status: SHIPPED | OUTPATIENT
Start: 2023-11-03 | End: 2023-11-24

## 2023-11-03 RX ADMIN — METHYLPREDNISOLONE ACETATE 40 MG: 40 INJECTION, SUSPENSION INTRA-ARTICULAR; INTRALESIONAL; INTRAMUSCULAR; SOFT TISSUE at 11:11

## 2023-11-03 RX ADMIN — CEFTRIAXONE 1 G: 1 INJECTION, POWDER, FOR SOLUTION INTRAMUSCULAR; INTRAVENOUS at 11:11

## 2023-11-03 NOTE — PROGRESS NOTES
Subjective:       Patient ID: Luda Peters is a 78 y.o. female.    Chief Complaint: Follow-up      Luda Peters is in the office for f/u.    Follow-up  Associated symptoms include arthralgias, coughing and myalgias. Pertinent negatives include no congestion, fatigue or fever.       Past Medical History:   Diagnosis Date    Anticoagulant long-term use     Anxiety 9/26/2016    Back pain     Closed fracture of second lumbar vertebra with routine healing 12/7/2015    COPD (chronic obstructive pulmonary disease) 9/26/2016    Emphysema of lung     Encounter for blood transfusion     Gastric ulcer     Hypertension      Neuro: stable, no changes  ENT: slight increase in congestion and drip of late  Cards: no cp or palp  Pulm: increased cough and congestion, nebulizer at least 2x/day  GI: no abd c/o, no gerd c/o; bowels move normally  : +frequency, urgency, some leak throughout the day  Musc: +chronic pain in multiple areas (back, knees, shoulder), seeing pain mgmt to discuss repeat injection; using topical patches for the shoulder  Endo: no issues  Derm: rash from topical salonpas patches on the shoulder has resolved once they switched to topical biofreeze      Current Outpatient Medications:     acetaminophen (TYLENOL) 500 MG tablet, Take 500 mg by mouth Daily., Disp: , Rfl:     acetaminophen (TYLENOL) 650 MG TbSR, Take 1,300 mg by mouth 2 (two) times daily., Disp: , Rfl:     allopurinoL (ZYLOPRIM) 100 MG tablet, TAKE 1 TABLET(100 MG) BY MOUTH EVERY DAY, Disp: 90 tablet, Rfl: 1    ARIPiprazole (ABILIFY) 2 MG Tab, Take 1 tablet (2 mg total) by mouth once daily., Disp: 90 tablet, Rfl: 3    ascorbic acid, vitamin C, (VITAMIN C) 1000 MG tablet, Take 1,000 mg by mouth daily as needed (immune support)., Disp: , Rfl:     atorvastatin (LIPITOR) 20 MG tablet, Take 1 tablet (20 mg total) by mouth once daily., Disp: 90 tablet, Rfl: 3    clopidogreL (PLAVIX) 75 mg tablet, TAKE 1 TABLET BY MOUTH EVERY DAY, Disp: 90  tablet, Rfl: 2    diclofenac sodium (VOLTAREN) 1 % Gel, APPLY 2 GRAMS TO THE AFFECTED AREA TWICE DAILY, Disp: 100 g, Rfl: 1    docusate sodium (COLACE) 100 MG capsule, Take 100 mg by mouth 2 (two) times daily., Disp: , Rfl:     EScitalopram oxalate (LEXAPRO) 20 MG tablet, Take 1 tablet (20 mg total) by mouth every evening., Disp: 90 tablet, Rfl: 3    furosemide (LASIX) 40 MG tablet, TAKE 1 TABLET BY MOUTH TWICE DAILY, Disp: 180 tablet, Rfl: 3    gabapentin (NEURONTIN) 600 MG tablet, TAKE 1 TABLET(600 MG) BY MOUTH TWICE DAILY, Disp: 180 tablet, Rfl: 1    metoprolol tartrate (LOPRESSOR) 25 MG tablet, TAKE 1 TABLET BY MOUTH TWICE DAILY, Disp: 180 tablet, Rfl: 3    multivitamin (THERAGRAN) per tablet, Take 1 tablet by mouth daily as needed (immune support)., Disp: , Rfl:     nortriptyline (PAMELOR) 25 MG capsule, Take 1 capsule (25 mg total) by mouth every evening., Disp: 90 capsule, Rfl: 3    pantoprazole (PROTONIX) 40 MG tablet, TAKE ONE TABLET BY MOUTH TWICE DAILY, Disp: 180 tablet, Rfl: 1    zinc acetate 50 mg (zinc) Cap, Take 1 tablet by mouth daily as needed (immune support)., Disp: , Rfl:     albuterol (PROVENTIL/VENTOLIN HFA) 90 mcg/actuation inhaler, Inhale 2 puffs into the lungs every 6 (six) hours as needed for Wheezing or Shortness of Breath. Rescue, Disp: 3 g, Rfl: 6    albuterol-ipratropium (DUO-NEB) 2.5 mg-0.5 mg/3 mL nebulizer solution, Take 3 mLs by nebulization every 6 (six) hours as needed for Wheezing. Rescue, Disp: 75 mL, Rfl: 5    budesonide (PULMICORT) 0.5 mg/2 mL nebulizer solution, Take 2 mLs (0.5 mg total) by nebulization 2 (two) times daily as needed (wheezing). USE 1 VIAL VIA NEBULIZER EVERY 12 HOURS, Disp: 100 mL, Rfl: 2    doxycycline (VIBRA-TABS) 100 MG tablet, Take 1 tablet (100 mg total) by mouth 2 (two) times daily., Disp: 14 tablet, Rfl: 0    fluticasone-umeclidin-vilanter (TRELEGY ELLIPTA) 100-62.5-25 mcg DsDv, Inhale 1 puff into the lungs once daily., Disp: 180 each, Rfl: 3     methylPREDNISolone (MEDROL DOSEPACK) 4 mg tablet, use as directed, Disp: 21 each, Rfl: 0    naproxen sodium (ANAPROX) 220 MG tablet, Take 220 mg by mouth Daily., Disp: , Rfl:     The 10-year ASCVD risk score (Leny ALY, et al., 2019) is: 14.9%    Values used to calculate the score:      Age: 78 years      Sex: Female      Is Non- : No      Diabetic: No      Tobacco smoker: No      Systolic Blood Pressure: 104 mmHg      Is BP treated: No      HDL Cholesterol: 47 mg/dL      Total Cholesterol: 174 mg/dL     Lab Results   Component Value Date    HGBA1C 5.6 10/09/2023    HGBA1C 5.7 (H) 05/05/2023    HGBA1C 5.9 (H) 10/15/2021     Lab Results   Component Value Date    LDLCALC 97.8 05/05/2023    CREATININE 1.2 10/09/2023   Labs 2023 rev.     Review of Systems   Constitutional:  Negative for fatigue and fever.   HENT:  Negative for congestion and nosebleeds.    Respiratory:  Positive for cough and wheezing.    Cardiovascular:  Negative for palpitations and leg swelling.   Gastrointestinal:  Negative for constipation and diarrhea.   Musculoskeletal:  Positive for arthralgias, back pain and myalgias.   Psychiatric/Behavioral:  Positive for sleep disturbance (waking up a little earlier than usual).            Objective:      Physical Exam  Vitals and nursing note reviewed.   Constitutional:       General: She is not in acute distress.     Appearance: Normal appearance. She is well-developed.   HENT:      Head: Normocephalic and atraumatic.   Eyes:      General: No scleral icterus.        Right eye: No discharge.         Left eye: No discharge.      Conjunctiva/sclera: Conjunctivae normal.   Cardiovascular:      Rate and Rhythm: Normal rate and regular rhythm.   Pulmonary:      Effort: Pulmonary effort is normal. No respiratory distress.      Breath sounds: Wheezing present.   Abdominal:      Palpations: Abdomen is soft.      Tenderness: There is no guarding.   Musculoskeletal:         General: Tenderness  present. No deformity or signs of injury.      Cervical back: Neck supple.      Right lower leg: No edema.      Left lower leg: No edema.   Skin:     General: Skin is warm and dry.      Findings: No rash.   Neurological:      General: No focal deficit present.      Mental Status: She is alert and oriented to person, place, and time.   Psychiatric:         Mood and Affect: Mood normal.         Behavior: Behavior normal.             Screening recommendations appropriate to age and health status were reviewed.    Assessment & Plan:    COPD with exacerbation  -     methylPREDNISolone acetate injection 40 mg  -     methylPREDNISolone (MEDROL DOSEPACK) 4 mg tablet; use as directed  Dispense: 21 each; Refill: 0  -     cefTRIAXone injection 1 g  -     doxycycline (VIBRA-TABS) 100 MG tablet; Take 1 tablet (100 mg total) by mouth 2 (two) times daily.  Dispense: 14 tablet; Refill: 0    Osteoporosis, unspecified osteoporosis type, unspecified pathological fracture presence    Centrilobular emphysema  -     fluticasone-umeclidin-vilanter (TRELEGY ELLIPTA) 100-62.5-25 mcg DsDv; Inhale 1 puff into the lungs once daily.  Dispense: 180 each; Refill: 3  -     albuterol (PROVENTIL/VENTOLIN HFA) 90 mcg/actuation inhaler; Inhale 2 puffs into the lungs every 6 (six) hours as needed for Wheezing or Shortness of Breath. Rescue  Dispense: 3 g; Refill: 6  -     albuterol-ipratropium (DUO-NEB) 2.5 mg-0.5 mg/3 mL nebulizer solution; Take 3 mLs by nebulization every 6 (six) hours as needed for Wheezing. Rescue  Dispense: 75 mL; Refill: 5  -     budesonide (PULMICORT) 0.5 mg/2 mL nebulizer solution; Take 2 mLs (0.5 mg total) by nebulization 2 (two) times daily as needed (wheezing). USE 1 VIAL VIA NEBULIZER EVERY 12 HOURS  Dispense: 100 mL; Refill: 2    Sciatica of right side  Comments:  cont f/u with pain mgmt      45 minutes of total time spent on the encounter, which includes face to face time and non-face to face time preparing to see the  patient (eg. review of tests), obtaining and/or reviewing separately obtained history, documenting clinical information in the electronic health record, independently interpreting results (not separately reported), and communicating results to the patient/family/caregiver, or care coordination (not separately reported).

## 2023-11-07 RX ORDER — GABAPENTIN 600 MG/1
600 TABLET ORAL 2 TIMES DAILY
Qty: 180 TABLET | Refills: 1 | Status: SHIPPED | OUTPATIENT
Start: 2023-11-07

## 2023-11-07 RX ORDER — CLOPIDOGREL BISULFATE 75 MG/1
75 TABLET ORAL DAILY
Qty: 90 TABLET | Refills: 1 | Status: SHIPPED | OUTPATIENT
Start: 2023-11-07

## 2023-11-07 NOTE — TELEPHONE ENCOUNTER
No care due was identified.  [facility] Embedded Care Due Messages. Reference number: [id number].   11/07/2023 8:49:45 AM CST

## 2023-11-07 NOTE — TELEPHONE ENCOUNTER
----- Message from Seda Braeden sent at 11/7/2023  7:54 AM CST -----  Regarding: Requesting refills  Type: Needs Medical Advice  Who Called:  Daughter    Pharmacy name and phone #:    [id number] - [place] [id number] [address] [address]  Phone: [phone] Fax: [phone]          Best Call Back Number: [phone]    Additional Information: Pt daughter called asking for  90 day refills for her moms medications, both scripts do not have refills and she was trying to get this taken care of as soon as possible     Gabapentin and Clopidogrel         Estimated Blood Loss (Cc): minimal

## 2023-11-30 DIAGNOSIS — M25.561 ACUTE PAIN OF RIGHT KNEE: ICD-10-CM

## 2023-11-30 NOTE — TELEPHONE ENCOUNTER
Care Due:                  Date            Visit Type   Department     Provider  --------------------------------------------------------------------------------                                EP -                              PRIMARY      [facility]  Last Visit: 11-      CARE ([facility])   MEDICINE       Nuzhat Kline  Next Visit: None Scheduled  None         None Found                                                            Last  Test          Frequency    Reason                     Performed    Due Date  --------------------------------------------------------------------------------    Uric Acid...  12 months..  allopurinoL..............  Not Found    Overdue    [employer] Embedded Care Due Messages. Reference number: [id number].   11/30/2023 5:36:07 PM CST

## 2023-12-01 RX ORDER — DICLOFENAC SODIUM 10 MG/G
GEL TOPICAL
Qty: 100 G | Refills: 1 | Status: SHIPPED | OUTPATIENT
Start: 2023-12-01 | End: 2024-01-12

## 2023-12-08 ENCOUNTER — OFFICE VISIT (OUTPATIENT)
Dept: PHYSICAL MEDICINE AND REHAB | Facility: CLINIC | Age: 78
End: 2023-12-08
Payer: MEDICARE

## 2023-12-08 VITALS
BODY MASS INDEX: 30.51 KG/M2 | SYSTOLIC BLOOD PRESSURE: 130 MMHG | WEIGHT: 165.81 LBS | DIASTOLIC BLOOD PRESSURE: 62 MMHG | HEART RATE: 82 BPM | HEIGHT: 62 IN

## 2023-12-08 DIAGNOSIS — M79.604 RIGHT LEG PAIN: ICD-10-CM

## 2023-12-08 DIAGNOSIS — M70.61 GREATER TROCHANTERIC BURSITIS OF RIGHT HIP: Primary | ICD-10-CM

## 2023-12-08 PROCEDURE — 1160F RVW MEDS BY RX/DR IN RCRD: CPT | Mod: CPTII,S$GLB,, | Performed by: PHYSICAL MEDICINE & REHABILITATION

## 2023-12-08 PROCEDURE — 1101F PT FALLS ASSESS-DOCD LE1/YR: CPT | Mod: CPTII,S$GLB,, | Performed by: PHYSICAL MEDICINE & REHABILITATION

## 2023-12-08 PROCEDURE — 3075F SYST BP GE 130 - 139MM HG: CPT | Mod: CPTII,S$GLB,, | Performed by: PHYSICAL MEDICINE & REHABILITATION

## 2023-12-08 PROCEDURE — 1159F MED LIST DOCD IN RCRD: CPT | Mod: CPTII,S$GLB,, | Performed by: PHYSICAL MEDICINE & REHABILITATION

## 2023-12-08 PROCEDURE — 99999 PR PBB SHADOW E&M-EST. PATIENT-LVL IV: ICD-10-PCS | Mod: PBBFAC,,, | Performed by: PHYSICAL MEDICINE & REHABILITATION

## 2023-12-08 PROCEDURE — 99214 OFFICE O/P EST MOD 30 MIN: CPT | Mod: S$GLB,,, | Performed by: PHYSICAL MEDICINE & REHABILITATION

## 2023-12-08 PROCEDURE — 3288F PR FALLS RISK ASSESSMENT DOCUMENTED: ICD-10-PCS | Mod: CPTII,S$GLB,, | Performed by: PHYSICAL MEDICINE & REHABILITATION

## 2023-12-08 PROCEDURE — 1101F PR PT FALLS ASSESS DOC 0-1 FALLS W/OUT INJ PAST YR: ICD-10-PCS | Mod: CPTII,S$GLB,, | Performed by: PHYSICAL MEDICINE & REHABILITATION

## 2023-12-08 PROCEDURE — 1159F PR MEDICATION LIST DOCUMENTED IN MEDICAL RECORD: ICD-10-PCS | Mod: CPTII,S$GLB,, | Performed by: PHYSICAL MEDICINE & REHABILITATION

## 2023-12-08 PROCEDURE — 3288F FALL RISK ASSESSMENT DOCD: CPT | Mod: CPTII,S$GLB,, | Performed by: PHYSICAL MEDICINE & REHABILITATION

## 2023-12-08 PROCEDURE — 3078F PR MOST RECENT DIASTOLIC BLOOD PRESSURE < 80 MM HG: ICD-10-PCS | Mod: CPTII,S$GLB,, | Performed by: PHYSICAL MEDICINE & REHABILITATION

## 2023-12-08 PROCEDURE — 1125F PR PAIN SEVERITY QUANTIFIED, PAIN PRESENT: ICD-10-PCS | Mod: CPTII,S$GLB,, | Performed by: PHYSICAL MEDICINE & REHABILITATION

## 2023-12-08 PROCEDURE — 3075F PR MOST RECENT SYSTOLIC BLOOD PRESS GE 130-139MM HG: ICD-10-PCS | Mod: CPTII,S$GLB,, | Performed by: PHYSICAL MEDICINE & REHABILITATION

## 2023-12-08 PROCEDURE — 99214 PR OFFICE/OUTPT VISIT, EST, LEVL IV, 30-39 MIN: ICD-10-PCS | Mod: S$GLB,,, | Performed by: PHYSICAL MEDICINE & REHABILITATION

## 2023-12-08 PROCEDURE — 1125F AMNT PAIN NOTED PAIN PRSNT: CPT | Mod: CPTII,S$GLB,, | Performed by: PHYSICAL MEDICINE & REHABILITATION

## 2023-12-08 PROCEDURE — 99999 PR PBB SHADOW E&M-EST. PATIENT-LVL IV: CPT | Mod: PBBFAC,,, | Performed by: PHYSICAL MEDICINE & REHABILITATION

## 2023-12-08 PROCEDURE — 3078F DIAST BP <80 MM HG: CPT | Mod: CPTII,S$GLB,, | Performed by: PHYSICAL MEDICINE & REHABILITATION

## 2023-12-08 PROCEDURE — 1160F PR REVIEW ALL MEDS BY PRESCRIBER/CLIN PHARMACIST DOCUMENTED: ICD-10-PCS | Mod: CPTII,S$GLB,, | Performed by: PHYSICAL MEDICINE & REHABILITATION

## 2023-12-08 RX ORDER — METHYLPREDNISOLONE 4 MG/1
TABLET ORAL
Qty: 21 EACH | Refills: 0 | Status: SHIPPED | OUTPATIENT
Start: 2023-12-08 | End: 2023-12-29

## 2023-12-08 NOTE — PROGRESS NOTES
[facility]    PCP: Nuzhat Kline MD    CHIEF COMPLAINT:   Chief Complaint   Patient presents with    Hip Pain     C/o pain in right hip        HISTORY OF PRESENT ILLNESS: Luda Peters is a 78 y.o. female who presents to me for follow up evaluation and management of right hip/leg pain.     Presents with daughter/caretaker.     Today reports, unchanged chronic right sided hip and leg pain. Only 3 hours of relief from right GTB steroid injection. Significant pain to the outside of her buttock/hip area. Worse with ambulation. Arrives in wheelchair today due to her pain.     Medications: voltaren gel, gabapentin  Injections:  - right GTB steroid 08/31/2023  - L5/S1 LIN 07/07/2023  - right L4/5 TFESI 08/17/2018  Therapies: HEP, no formal  Bracing: none  DME: none    Review of Systems   Constitutional: Negative for fever.   HENT: Negative for drooling.    Eyes: Negative for discharge.   Respiratory: Negative for choking.    Cardiovascular: Negative for chest pain.   Genitourinary: Negative for flank pain.   Skin: Negative for wound.   Allergic/Immunologic: Negative for immunocompromised state.   Neurological: Negative for tremors and syncope.   Psychiatric/Behavioral: Negative for behavioral problems.     Past Medical History:   Past Medical History:   Diagnosis Date    Anticoagulant long-term use     Anxiety 9/26/2016    Back pain     Closed fracture of second lumbar vertebra with routine healing 12/7/2015    COPD (chronic obstructive pulmonary disease) 9/26/2016    Emphysema of lung     Encounter for blood transfusion     Gastric ulcer     Hypertension        Past Surgical History:   Past Surgical History:   Procedure Laterality Date    EPIDURAL STEROID INJECTION INTO LUMBAR SPINE Right 7/7/2023    Procedure: Injection-steroid-epidural-lumbar L5/S1;  Surgeon: Casey Mccall MD;  Location: [facility] OR;  Service: Pain Management;  Laterality: Right;     ESOPHAGOGASTRODUODENOSCOPY N/A 3/22/2019    Procedure: EGD (ESOPHAGOGASTRODUODENOSCOPY);  Surgeon: Nomi Fajardo Jr., MD;  Location: [facility];  Service: Endoscopy;  Laterality: N/A;    HYSTERECTOMY      TRANSFORAMINAL EPIDURAL INJECTION OF STEROID Right 12/30/2021    Procedure: Injection,steroid,epidural,transforaminal approach L4/5;  Surgeon: Casey Mccall MD;  Location: [facility];  Service: Pain Management;  Laterality: Right;       Family History:   Family History   Problem Relation Age of Onset    No Known Problems Son        Medications:   Current Outpatient Medications on File Prior to Visit   Medication Sig Dispense Refill    acetaminophen (TYLENOL) 500 MG tablet Take 500 mg by mouth Daily.      acetaminophen (TYLENOL) 650 MG TbSR Take 1,300 mg by mouth 2 (two) times daily.      albuterol (PROVENTIL/VENTOLIN HFA) 90 mcg/actuation inhaler Inhale 2 puffs into the lungs every 6 (six) hours as needed for Wheezing or Shortness of Breath. Rescue 3 g 6    albuterol-ipratropium (DUO-NEB) 2.5 mg-0.5 mg/3 mL nebulizer solution Take 3 mLs by nebulization every 6 (six) hours as needed for Wheezing. Rescue 75 mL 5    allopurinoL (ZYLOPRIM) 100 MG tablet TAKE 1 TABLET(100 MG) BY MOUTH EVERY DAY 90 tablet 1    ARIPiprazole (ABILIFY) 2 MG Tab Take 1 tablet (2 mg total) by mouth once daily. 90 tablet 3    ascorbic acid, vitamin C, (VITAMIN C) 1000 MG tablet Take 1,000 mg by mouth daily as needed (immune support).      atorvastatin (LIPITOR) 20 MG tablet Take 1 tablet (20 mg total) by mouth once daily. 90 tablet 3    budesonide (PULMICORT) 0.5 mg/2 mL nebulizer solution Take 2 mLs (0.5 mg total) by nebulization 2 (two) times daily as needed (wheezing). USE 1 VIAL VIA NEBULIZER EVERY 12 HOURS 100 mL 2    clopidogreL (PLAVIX) 75 mg tablet Take 1 tablet (75 mg total) by mouth once daily. 90 tablet 1    diclofenac sodium (VOLTAREN) 1 % Gel APPLY 2 GRAMS TOPICALLY TO THE AFFECTED AREA TWICE DAILY 100 g 1    docusate sodium  (COLACE) 100 MG capsule Take 100 mg by mouth 2 (two) times daily.      doxycycline (VIBRA-TABS) 100 MG tablet Take 1 tablet (100 mg total) by mouth 2 (two) times daily. 14 tablet 0    EScitalopram oxalate (LEXAPRO) 20 MG tablet Take 1 tablet (20 mg total) by mouth every evening. 90 tablet 3    fluticasone-umeclidin-vilanter (TRELEGY ELLIPTA) 100-62.5-25 mcg DsDv Inhale 1 puff into the lungs once daily. 180 each 3    furosemide (LASIX) 40 MG tablet TAKE 1 TABLET BY MOUTH TWICE DAILY 180 tablet 3    gabapentin (NEURONTIN) 600 MG tablet Take 1 tablet (600 mg total) by mouth 2 (two) times daily. 180 tablet 1    metoprolol tartrate (LOPRESSOR) 25 MG tablet TAKE 1 TABLET BY MOUTH TWICE DAILY 180 tablet 3    multivitamin (THERAGRAN) per tablet Take 1 tablet by mouth daily as needed (immune support).      naproxen sodium (ANAPROX) 220 MG tablet Take 220 mg by mouth Daily.      nortriptyline (PAMELOR) 25 MG capsule Take 1 capsule (25 mg total) by mouth every evening. 90 capsule 3    pantoprazole (PROTONIX) 40 MG tablet TAKE ONE TABLET BY MOUTH TWICE DAILY 180 tablet 1    zinc acetate 50 mg (zinc) Cap Take 1 tablet by mouth daily as needed (immune support).       No current facility-administered medications on file prior to visit.       Allergies:   Review of patient's allergies indicates:   Allergen Reactions    Iodine and iodide containing products        Social History:   Social History     Socioeconomic History    Marital status:    Tobacco Use    Smoking status: Former     Current packs/day: 0.00     Types: Cigarettes     Quit date: 10/26/2014     Years since quittin.1    Smokeless tobacco: Never   Substance and Sexual Activity    Alcohol use: Yes     Alcohol/week: 0.0 standard drinks of alcohol     Comment: Social    Drug use: No   Social History Narrative    Originally from [place].     Social Determinants of Health     Financial Resource Strain: Unknown (10/9/2023)    Overall Financial Resource Strain  "(CARDIA)     Difficulty of Paying Living Expenses: Patient refused   Food Insecurity: Unknown (10/9/2023)    Hunger Vital Sign     Worried About Running Out of Food in the Last Year: Patient refused     Ran Out of Food in the Last Year: Patient refused   Transportation Needs: Unknown (10/9/2023)    PRAPARE - Transportation     Lack of Transportation (Medical): No     Lack of Transportation (Non-Medical): Patient refused   Physical Activity: Insufficiently Active (10/9/2023)    Exercise Vital Sign     Days of Exercise per Week: 2 days     Minutes of Exercise per Session: 20 min   Stress: Stress Concern Present (10/9/2023)    [employer] - Occupational Stress Questionnaire     Feeling of Stress : To some extent   Social Connections: Unknown (10/9/2023)    Social Connection and Isolation Panel [NHANES]     Frequency of Communication with Friends and Family: More than three times a week     Frequency of Social Gatherings with Friends and Family: More than three times a week     Active Member of Clubs or Organizations: No     Attends Club or Organization Meetings: Patient refused     Marital Status:    Housing Stability: Unknown (10/9/2023)    Housing Stability Vital Sign     Unable to Pay for Housing in the Last Year: Patient refused     Unstable Housing in the Last Year: Patient refused       PHYSICAL EXAMINATION:   Vitals:    12/08/23 0927   BP: 130/62   Pulse: 82   Weight: 75.2 kg (165 lb 12.6 oz)   Height: 5' 2" (1.575 m)     Constitutional: No apparent distress. Pleasant.  HENT: Trachea midline.  Head: Normocephalic and atraumatic.   Eyes: Right eye exhibits no discharge. Left eye exhibits no discharge. No scleral icterus.   CV: Well perfused.   Pulmonary/Chest: Effort normal. No respiratory distress.   Abdominal: There is no guarding.   Neurological: Awake, alert and cooperative.  SKIN: Intact no apparent lesions, cut, ulcers or abrasions  EXT:  No cyanosis, clubbing, or " edema.  SENSORY: Intact to light touch in the bilateral lower extemities.  MUSCKULOSKELETAL:   Muscle Strength:(0-5)                             Right     Left  Hip Flexors                5  5  Hip Abductor              4  4  Hip Adductor              5  5  Knee Extensors          5  5  Knee Flexors              5  5  Ankle Dorsiflex           5  5  Ankle Planterflex        5  5  EHL                            5  5    Reflexes: (0-4+/4)   Right     Left  Patellar(L4)  2+  2+  Ankle(S1)  2+  2+    INSPECTION:         Right     Left   Localized/Generalized swelling:   -  -  Muscle contours normal and symmetrical: +  +  Erythema:      -  -  Gross deformity:    -  -    GAIT: smooth and symmetrical with equal arm swing    RANGE OF MOTION:           Right      Left  Lumbar Flexion:                    Full  Full   Lumbar Extension:                  Full  Full  Lumbar Lateral bending: Full  Full  Hip Internal Rotation:  Full  Full  Hip External Rotation:  Full  Full  Log Rolling:   Full  Full  Other:     TENDERNESS:                 Right      Left  Trochanteric bursa:     ++  -  Gluteal muscles:          +  -  SI Joints:           -  -  Axial lumbar loading:   -  -  Simulated trunk rotation: -  -     SPECIAL TESTS:         Right     Left  Seated SLR:       -  -  Jona's (VIBHA):  -  -  Lumbar Phalen's:  -  -  Piriformis stretch:  -  -  FAIR:    -  -  Ankle clonus:   -  -  Babinski:   -  -  Other:    Imaging  XR lumbar spine from 04/24/2023 with noted:   1. Chronic T11, L2, and L4 compression fractures on a background of diffuse bony demineralization.  No definite acute bony changes allowing for limited plain film radiography.  2. Chronic anterolisthesis of L5 on S1 similar to the prior exam.  3. Aortic aneurysm redemonstrated.    CT lumbar spine from 06/09/2023 with noted:   1. No acute bony process.  Chronic T11, L2, and L4 fractures on a background of multilevel spondylosis discussed above.  2. Aneurysmal dilatation of  the abdominal aorta and prominent atherosclerotic changes.    Data Reviewed: X-ray    Supportive Actions: Independent visualization of images or test specimens.    ASSESSMENT:   1. Greater trochanteric bursitis of right hip    2. Right leg pain          PLAN:   1. Time was spent reviewing the above diagnosis in depth with Luda today, including acute management and rehabilitation.     2. Less than ideal response from right GTB steroid injections. At this time will start medrol steroid pack and formal aquatic PT.     RTC 2 months.     30 minutes of total time spent on the encounter, which includes face to face time and non-face to face time preparing to see the patient (eg, review of tests), obtaining and/or reviewing separately obtained history, documenting clinical information in the electronic or other health record, independently interpreting results (not separately reported), communicating results to the patient/family/caregiver, and/or care coordination (not separately reported).

## 2023-12-22 ENCOUNTER — PATIENT MESSAGE (OUTPATIENT)
Dept: FAMILY MEDICINE | Facility: CLINIC | Age: 78
End: 2023-12-22
Payer: MEDICARE

## 2023-12-22 ENCOUNTER — TELEPHONE (OUTPATIENT)
Dept: FAMILY MEDICINE | Facility: CLINIC | Age: 78
End: 2023-12-22

## 2023-12-22 DIAGNOSIS — M25.561 ACUTE PAIN OF RIGHT KNEE: ICD-10-CM

## 2023-12-22 DIAGNOSIS — M25.551 RIGHT HIP PAIN: Primary | ICD-10-CM

## 2023-12-22 RX ORDER — DICLOFENAC SODIUM 10 MG/G
GEL TOPICAL
Qty: 100 G | Refills: 1 | Status: CANCELLED | OUTPATIENT
Start: 2023-12-22

## 2023-12-22 NOTE — TELEPHONE ENCOUNTER
----- Message from Beth Fernandes sent at 12/22/2023  7:35 AM CST -----  Contact: patient daughter  Type:  Needs Medical Advice    Who Called: Patient's daughterGabrielle     Would the patient rather a call back or a response via [facility]? Call     Best Call Back Number: [phone]    Additional Information: Patient's daughterGabrielle would like to speak with the nurse in regards to getting x-ray ordered for ankle.     Please call to advise

## 2023-12-22 NOTE — TELEPHONE ENCOUNTER
No care due was identified.  [facility] Embedded Care Due Messages. Reference number: [id number].   12/22/2023 9:56:22 AM CST

## 2023-12-27 ENCOUNTER — CLINICAL SUPPORT (OUTPATIENT)
Dept: REHABILITATION | Facility: HOSPITAL | Age: 78
End: 2023-12-27
Payer: MEDICARE

## 2023-12-27 DIAGNOSIS — R26.9 GAIT ABNORMALITY: ICD-10-CM

## 2023-12-27 DIAGNOSIS — M79.604 RIGHT LEG PAIN: ICD-10-CM

## 2023-12-27 DIAGNOSIS — R29.898 IMPAIRED STRENGTH OF HIP MUSCLES: Primary | ICD-10-CM

## 2023-12-27 DIAGNOSIS — M70.61 GREATER TROCHANTERIC BURSITIS OF RIGHT HIP: ICD-10-CM

## 2023-12-27 PROCEDURE — 97162 PT EVAL MOD COMPLEX 30 MIN: CPT | Mod: PO | Performed by: PHYSICAL THERAPIST

## 2023-12-28 PROBLEM — R29.898 IMPAIRED STRENGTH OF HIP MUSCLES: Status: ACTIVE | Noted: 2023-12-28

## 2023-12-28 PROBLEM — R26.9 GAIT ABNORMALITY: Status: ACTIVE | Noted: 2023-12-28

## 2023-12-28 NOTE — PLAN OF CARE
[facility]   Physical Therapy Initial Evaluation      Name: Luda Peters  Clinic Number: [id number]    Therapy Diagnosis:   Encounter Diagnoses   Name Primary?    Greater trochanteric bursitis of right hip     Right leg pain     Impaired strength of hip muscles Yes    Gait abnormality         Physician: Lizz Neely, DO    Physician Orders: PT Eval and Treat   QuestionAnswer Post Surgical?No Eval and TreatYes Type of TherapyAquatic Therapy  Medical Diagnosis from Referral:   Diagnosis   M70.61 (ICD-10-CM) - Greater trochanteric bursitis of right hip   M79.604 (ICD-10-CM) - Right leg pain     Evaluation Date: 12/27/2023  Authorization Period Expiration: 12/31/2023  Plan of Care Expiration: 2/23/2024  Progress Note Due: 1/26/2024  Date of Surgery: na  Visit # / Visits authorized: 1/ 1   FOTO: 1/ 3    Precautions: Standard     Past Medical History:   Diagnosis Date    Anticoagulant long-term use     Anxiety 9/26/2016    Back pain     Closed fracture of second lumbar vertebra with routine healing 12/7/2015    COPD (chronic obstructive pulmonary disease) 9/26/2016    Emphysema of lung     Encounter for blood transfusion     Gastric ulcer     Hypertension        Time In: 1415 (late arrival)  Time Out: 1500  Total Billable Time: 45 minutes    Subjective     Date of onset: 2 yrs since tib/fib fracture of right Leg. Was in cast at the time, no sx.    History of current condition - Luda reports:   unchanged chronic right sided hip and leg pain. Only 3 hours of relief from right GTB steroid injection. Significant pain to the outside of her buttock/hip area. Worse with ambulation. Arrives in wheelchair today due to her pain. Uses RW when ambulating. Uses walker or buggy at small stores. + for incontinence.     Medications: voltaren gel, gabapentin  Injections:  - right GTB steroid 08/31/2023  - L5/S1 LIN 07/07/2023  - right L4/5 TFESI 08/17/2018  Therapies: HEP, no formal  Bracing:  none  DME: none    Falls: 0    Imaging: none    Prior Therapy: home program from DO  Social History:  , lives with daughter (Gabrielle)  Occupation: not working  Prior Level of Function: Chronic joint pain  Current Level of Function: Pt is walking short distances with RW and req w/c for long distances    Pain: 8/10 at right ankle,  6/10 Left knee  Current 0/10, worst 8/10, best 0/10   Location: right hip    Description: Aching  Aggravating Factors: Standing and Walking  Easing Factors: sitting    Patients goals: to dec pain and walk better.     Medical History:   Past Medical History:   Diagnosis Date    Anticoagulant long-term use     Anxiety 9/26/2016    Back pain     Closed fracture of second lumbar vertebra with routine healing 12/7/2015    COPD (chronic obstructive pulmonary disease) 9/26/2016    Emphysema of lung     Encounter for blood transfusion     Gastric ulcer     Hypertension        Surgical History:   Luda Peters  has a past surgical history that includes Hysterectomy; Esophagogastroduodenoscopy (N/A, 3/22/2019); Transforaminal epidural injection of steroid (Right, 12/30/2021); and Epidural steroid injection into lumbar spine (Right, 7/7/2023).    Medications:   Luda has a current medication list which includes the following prescription(s): acetaminophen, acetaminophen, albuterol, albuterol-ipratropium, allopurinol, aripiprazole, ascorbic acid (vitamin c), atorvastatin, budesonide, clopidogrel, diclofenac sodium, docusate sodium, doxycycline, escitalopram oxalate, fluticasone-umeclidin-vilanter, furosemide, gabapentin, methylprednisolone, metoprolol tartrate, multivitamin, naproxen sodium, nortriptyline, pantoprazole, and zinc acetate.    Allergies:   Review of patient's allergies indicates:   Allergen Reactions    Iodine and iodide containing products         Objective      Observation: Pt arrives in w/c. She is able to use RW in clinic with PT SBA.    Posture: impaired    Hip Range of  Motion:   Left active Left Passive Right active  Right Passive   Flexion Limited 25% Limited 10% wfl Limited 10%   Abduction  Limited 10% wfl Limited 10%   Extension  Limited 10% wfl Limited 10%   Ext. Rotation  Limited 10% wfl Limited 10%   Int. Rotation  Limited 10% wfl Limited 10%     Lower Extremity Strength  Right LE  Left LE    Knee extension: 4/5 Knee extension: 4/5   Knee flexion: 4-/5 Knee flexion: 4-/5   Hip flexion: 3+/5 Hip flexion: 3+/5   Hip Internal Rotation:      Hip Internal Rotation:       Hip External Rotation:     Hip External Rotation:       Hip extension:  3-/5 Hip extension: 3-/5   Hip abduction: 3/5 Hip abduction: 3+/5   Hip adduction: 3-/5 Hip adduction 3-/5   Ankle dorsiflexion: 4-/5 Ankle dorsiflexion: 4-/5   Ankle plantarflexion: 4-/5 Ankle plantarflexion: 4-/5     Palpation: pos right ITB, glutes       Intake Outcome Measure for FOTO HIP Survey    Therapist reviewed FOTO scores for Luda Peters on 12/27/2023.   FOTO report - see Media section or FOTO account episode details.    Intake Score: na  Goal:         Treatment     Total Treatment time (time-based codes) separate from Evaluation: 0 minutes     Luda received the treatments listed below:        Pt will be given flexibility and strengthening home program at first tx.    Patient Education and Home Exercises     Education provided:   - Pt/family was provided educational information, including: role of PT, role of pt/caregiver, goals for PT, POC, scheduling, and attendance policy.- pt verbalized understanding.   - Pool contraindications    Written Home Exercises Provided: to be given. Exercises were reviewed and Luda was able to demonstrate them prior to the end of the session.  Luda demonstrated fair  understanding of the education provided. See EMR under Patient Instructions for exercises provided during therapy sessions.    Assessment     Luda is a 78 y.o. female referred to outpatient Physical Therapy with a medical diagnosis  of Greater trochanteric bursitis of right hip. Patient presents with right hip bursitis, weakness, pain and resulting in poor gt. She uses RW short distances. She has a home program which we will review, provide MT, and expand HEP. She was wanting to get into the pool, but d/t incontinence, she is not a pool candidate. MD notified.    Patient prognosis is Fair.   Patient will benefit from skilled outpatient Physical Therapy to address the deficits stated above and in the chart below, provide patient /family education, and to maximize patientt's level of independence.     Plan of care discussed with patient: Yes  Patient's spiritual, cultural and educational needs considered and patient is agreeable to the plan of care and goals as stated below:     Anticipated Barriers for therapy: age, health, chronicity    Medical Necessity is demonstrated by the following  History  Co-morbidities and personal factors that may impact the plan of care [] LOW: no personal factors / co-morbidities  [] MODERATE: 1-2 personal factors / co-morbidities  [x] HIGH: 3+ personal factors / co-morbidities    Moderate / High Support Documentation:   Co-morbidities affecting plan of care:     Past Medical History:   Diagnosis Date    Anticoagulant long-term use     Anxiety 9/26/2016    Back pain     Closed fracture of second lumbar vertebra with routine healing 12/7/2015    COPD (chronic obstructive pulmonary disease) 9/26/2016    Emphysema of lung     Encounter for blood transfusion     Gastric ulcer     Hypertension      Personal Factors:   age  lifestyle     Examination  Body Structures and Functions, activity limitations and participation restrictions that may impact the plan of care [] LOW: addressing 1-2 elements  [] MODERATE: 3+ elements  [x] HIGH: 4+ elements (please support below)    Moderate / High Support Documentation: impaired right hip AROM, strength, gt     Clinical Presentation [] LOW: stable  [x] MODERATE: Evolving  [] HIGH:  Unstable     Decision Making/ Complexity Score: moderate       Goals:  Short Term Goals: 4 weeks   -Pt able to walk using RW without limp and SV.  -LE strength grossly 3/5 for safety.  -(I) in home program to be progressed.    Long Term Goals: 8 weeks   -LE strength grossly 3+/5 for dec pain.  -(I) HEP for d/c.    Plan     Plan of care Certification: 12/27/2023 to 2/23/2024.    Outpatient Physical Therapy 2 times weekly for 8 weeks to include the following interventions: Gait Training, Manual Therapy, Moist Heat/ Ice, Neuromuscular Re-ed, Patient Education, Self Care, Therapeutic Activities, Therapeutic Exercise, and Ultrasound.     Dasha Zhang, PT      I CERTIFY THE NEED FOR THESE SERVICES FURNISHED UNDER THIS PLAN OF TREATMENT AND WHILE UNDER MY CARE    Physician's comments:        Physician's Signature: ___________________________________________________ DATE:_______________________

## 2024-01-12 ENCOUNTER — HOSPITAL ENCOUNTER (OUTPATIENT)
Dept: RADIOLOGY | Facility: HOSPITAL | Age: 79
Discharge: HOME | End: 2024-01-12
Attending: STUDENT IN AN ORGANIZED HEALTH CARE EDUCATION/TRAINING PROGRAM
Payer: MEDICARE

## 2024-01-12 ENCOUNTER — HOSPITAL ENCOUNTER (OUTPATIENT)
Dept: RADIOLOGY | Facility: HOSPITAL | Age: 79
Discharge: HOME | End: 2024-01-12
Attending: FAMILY MEDICINE
Payer: MEDICARE

## 2024-01-12 DIAGNOSIS — M25.571 RIGHT ANKLE PAIN: ICD-10-CM

## 2024-01-12 DIAGNOSIS — M79.671 RIGHT FOOT PAIN: ICD-10-CM

## 2024-01-12 DIAGNOSIS — M25.551 RIGHT HIP PAIN: ICD-10-CM

## 2024-01-12 PROCEDURE — 73610 X-RAY EXAM OF ANKLE: CPT | Mod: TC,PN,RT

## 2024-01-12 PROCEDURE — 73630 X-RAY EXAM OF FOOT: CPT | Mod: TC,PN,RT

## 2024-01-12 PROCEDURE — 73610 X-RAY EXAM OF ANKLE: CPT | Mod: 26,RT,, | Performed by: RADIOLOGY

## 2024-01-12 PROCEDURE — 73630 X-RAY EXAM OF FOOT: CPT | Mod: 26,RT,, | Performed by: RADIOLOGY

## 2024-01-12 PROCEDURE — 73502 X-RAY EXAM HIP UNI 2-3 VIEWS: CPT | Mod: 26,RT,, | Performed by: RADIOLOGY

## 2024-01-12 PROCEDURE — 73502 X-RAY EXAM HIP UNI 2-3 VIEWS: CPT | Mod: TC,PN,RT

## 2024-01-26 ENCOUNTER — OFFICE VISIT (OUTPATIENT)
Dept: FAMILY MEDICINE | Facility: CLINIC | Age: 79
End: 2024-01-26
Payer: MEDICARE

## 2024-01-26 VITALS
HEIGHT: 62 IN | BODY MASS INDEX: 30.36 KG/M2 | SYSTOLIC BLOOD PRESSURE: 128 MMHG | WEIGHT: 165 LBS | DIASTOLIC BLOOD PRESSURE: 74 MMHG

## 2024-01-26 DIAGNOSIS — M79.671 RIGHT FOOT PAIN: ICD-10-CM

## 2024-01-26 DIAGNOSIS — J43.2 CENTRILOBULAR EMPHYSEMA: Chronic | ICD-10-CM

## 2024-01-26 DIAGNOSIS — M25.551 RIGHT HIP PAIN: Primary | ICD-10-CM

## 2024-01-26 PROCEDURE — 99213 OFFICE O/P EST LOW 20 MIN: CPT | Mod: 95,,, | Performed by: FAMILY MEDICINE

## 2024-01-26 NOTE — PROGRESS NOTES
Subjective:       Patient ID: Luda Peters is a 78 y.o. female.    Chief Complaint: Follow-up (Test results )      The patient location is: [place]  The chief complaint leading to consultation is: foot pain  Visit type: Virtual visit with synchronous audio and video  Total time spent with patient: 10mins  Each patient to whom he or she provides medical services by telemedicine is:  (1) informed of the relationship between the physician and patient and the respective role of any other health care provider with respect to management of the patient; and (2) notified that he or she may decline to receive medical services by telemedicine and may withdraw from such care at any time.    Notes: patient seen for f/u.   Since lov, she is getting ready to start PT for her hip. She had an injection with pmr/colon that did not have any sustained improvement to hip pain.  Hip and foot xrays were reviewed.   Wheezing improved with increased nebs. Daughter had covid, but pt tested negative. She's feeling well otherwise.     Review of Systems   Constitutional:  Negative for activity change and unexpected weight change.   HENT:  Negative for hearing loss, rhinorrhea and trouble swallowing.    Eyes:  Negative for discharge and visual disturbance.   Respiratory:  Negative for chest tightness and wheezing.    Cardiovascular:  Positive for palpitations. Negative for chest pain.   Gastrointestinal:  Negative for blood in stool, constipation, diarrhea and vomiting.   Endocrine: Negative for polydipsia and polyuria.   Genitourinary:  Negative for difficulty urinating, dysuria, hematuria and menstrual problem.   Musculoskeletal:  Positive for arthralgias. Negative for joint swelling and neck pain.   Neurological:  Positive for weakness. Negative for headaches.   Psychiatric/Behavioral:  Positive for confusion. Negative for dysphoric mood.        Objective:        Physical Exam  Vitals and nursing note reviewed.   Constitutional:        General: She is not in acute distress.     Appearance: Normal appearance. She is well-developed.   HENT:      Head: Normocephalic and atraumatic.   Eyes:      General: No scleral icterus.        Right eye: No discharge.         Left eye: No discharge.      Conjunctiva/sclera: Conjunctivae normal.   Pulmonary:      Effort: Pulmonary effort is normal. No respiratory distress.   Skin:     General: Skin is warm and dry.   Neurological:      General: No focal deficit present.      Mental Status: She is alert and oriented to person, place, and time.   Psychiatric:         Mood and Affect: Mood normal.         Behavior: Behavior normal.             Assessment:   Right hip pain    Centrilobular emphysema    Right foot pain     Hip and foot xrays reassuring. Discussed old tib/fib fx appeared stable, no new fx. +arthritis changes as well as bone spurs that can contribute to pain.   Avoid walking barefoot. Ok to do ortho f/u if needed. Ok to proceed with PT as tolerated.   Copd stable. Let me know (vv) if any sustained increase in neb requirements or resp sx.     Patient aware of limitations to assessment and exam of telemedicine. Deemed appropriate at this time given current covid-19 concerns.

## 2024-02-02 ENCOUNTER — CLINICAL SUPPORT (OUTPATIENT)
Dept: REHABILITATION | Facility: HOSPITAL | Age: 79
End: 2024-02-02
Payer: MEDICARE

## 2024-02-02 DIAGNOSIS — R26.9 GAIT ABNORMALITY: ICD-10-CM

## 2024-02-02 DIAGNOSIS — R29.898 IMPAIRED STRENGTH OF HIP MUSCLES: Primary | ICD-10-CM

## 2024-02-02 PROCEDURE — 97112 NEUROMUSCULAR REEDUCATION: CPT | Mod: PO | Performed by: PHYSICAL THERAPIST

## 2024-02-02 PROCEDURE — 97110 THERAPEUTIC EXERCISES: CPT | Mod: PO | Performed by: PHYSICAL THERAPIST

## 2024-02-02 NOTE — PROGRESS NOTES
[facility]   Physical Therapy Treatment Note      Name: Luda Peters  Clinic Number: [id number]    Therapy Diagnosis:   Encounter Diagnoses   Name Primary?    Impaired strength of hip muscles Yes    Gait abnormality      Physician: Lizz Neely DO    Visit Date: 2/2/2024    Physician Orders: PT Eval and Treat   QuestionAnswer Post Surgical?No Eval and TreatYes Type of TherapyAquatic Therapy  Medical Diagnosis from Referral:   Diagnosis   M70.61 (ICD-10-CM) - Greater trochanteric bursitis of right hip   M79.604 (ICD-10-CM) - Right leg pain      Evaluation Date: 12/27/2023  Authorization Period Expiration: 12/31/2023, 12/31/2024   Plan of Care Expiration: 2/23/2024, 3/15/2024  Progress Note Due: 1/26/2024, 3/1/2024  Date of Surgery: na  Visit # / Visits authorized: 1/ 1, 1/20  FOTO: 2/ 3 Eval, 2/2/2024     Precautions: Standard           Past Medical History:   Diagnosis Date    Anticoagulant long-term use      Anxiety 9/26/2016    Back pain      Closed fracture of second lumbar vertebra with routine healing 12/7/2015    COPD (chronic obstructive pulmonary disease) 9/26/2016    Emphysema of lung      Encounter for blood transfusion      Gastric ulcer      Hypertension           Time In: 1015 (late arrival)  Time Out: 1100  Total Billable Time: 35 minutes    PTA Visit #: 0/5     Daughter: Gabrielle    Subjective     Patient reports: Her right hip and thigh is extremely hurting and Left leg hurts also. Late d/t traffic. The family had been sick.  She was not compliant with home exercise program.  Response to previous treatment: no adverse effect  Functional change: TBD    Pain: 5/10  Location: right thigh and hip      Objective      Objective Measures updated at progress report unless specified.     2/2/2024:  Observation: Pt arrives in w/c. She is able to use RW in clinic with PT SBA.     Posture: impaired     Hip Range of Motion:    Left active Left Passive Right active  Right  Passive   Flexion Limited 25% Limited 10% wfl Limited 10%   Abduction   Limited 10% wfl Limited 10%   Extension   Limited 10% wfl Limited 10%   Ext. Rotation   Limited 10% wfl Limited 10%   Int. Rotation   Limited 10% wfl Limited 10%      Lower Extremity Strength  Right LE   Left LE     Knee extension: 4/5 Knee extension: 4/5   Knee flexion: 4-/5 Knee flexion: 4-/5   Hip flexion: 3+/5 Hip flexion: 3+/5   Hip Internal Rotation:       Hip Internal Rotation:        Hip External Rotation:      Hip External Rotation:        Hip extension:  3-/5 Hip extension: 3-/5   Hip abduction: 3/5 Hip abduction: 3+/5   Hip adduction: 3-/5 Hip adduction 3-/5   Ankle dorsiflexion: 4-/5 Ankle dorsiflexion: 4-/5   Ankle plantarflexion: 4-/5 Ankle plantarflexion: 4-/5      Palpation: pos right ITB, glutes        Intake Outcome Measure for FOTO HIP Survey     Therapist reviewed FOTO scores for Luda Peters on 2024.   FOTO report - see Media section or FOTO account episode details.     Intake Score: 29  Goal: 44        Treatment     Luda received the treatments listed below:      therapeutic exercises to develop strength, endurance, ROM, flexibility, and posture for 10 minutes including:  Reassessment.    manual therapy techniques: 0 were applied to the: 0 for 0 minutes, includin    neuromuscular re-education activities to improve: Balance, Sense, Proprioception, and Posture for 17 minutes. The following activities were included:  AROM heel slide right x 6  Lying glute sets x 20  Quad set x 20  APT/PPT x 10  Ball squeezes x 20    HEP TO BE GIVEN AT NEXT APPT D/T LATE ARRIVAL.    Pt needing maximum cues for contraction, but then able to carry out. Limited by gross leg pain.    therapeutic activities to improve functional performance for 0  minutes, including:    gait training to improve functional mobility and safety for 08  minutes, including:  Clinic ambulation with RW CGA with gt belt x 50 feet    Patient Education and  Home Exercises       Education provided:   - Caregiver education    Written Home Exercises Provided:  too be given next visit . Exercises were reviewed and Luda was able to demonstrate them prior to the end of the session.  Luda demonstrated poor understanding of the education provided. See Electronic Medical Record under Patient Instructions for exercises provided during therapy sessions    Assessment     Luda returns due for reassessment d/t family was ill and out of caution. Late arrival to and was unable to given HEP d/t level of pain and tolerance to activity. Pt was in a lot of pain and was loud as a result demonstrating her level of pain. She was willing to try iso ex. Pt was painful in bilateral Les. She has had imaging of Left ankle. Pt is limited by multiple pains at this time. Will proceed to improve mobility as tolerated. Plan extended d/t re-starting and pt has not returned prior since eval.    Luda Is not progressing well towards her goals.   Patient prognosis is Fair.     Patient will continue to benefit from skilled outpatient physical therapy to address the deficits listed in the problem list box on initial evaluation, provide pt/family education and to maximize pt's level of independence in the home and community environment.     Patient's spiritual, cultural and educational needs considered and pt agreeable to plan of care and goals.     Anticipated barriers to physical therapy: age, health, chronicity     Goals: (ongoing)  Short Term Goals: 4 weeks   -Pt able to walk using RW without limp and SV.  -LE strength grossly 3/5 for safety.  -(I) in home program to be progressed.     Long Term Goals: 8 weeks (ongoing)  -LE strength grossly 3+/5 for dec pain.  -(I) HEP for d/c.    Plan     Plan of care Certification: 2/2/2024 to 3/15/2024     Outpatient Physical Therapy 1-2 times weekly for 8 weeks to include the following interventions: Gait Training, Manual Therapy, Moist Heat/ Ice, Neuromuscular Re-ed,  Patient Education, Self Care, Therapeutic Activities, Therapeutic Exercise, and Ultrasound.     Dasha Zhang, PT

## 2024-02-07 NOTE — PLAN OF CARE
[facility]  Physical Therapy Plan of Care Note     Name: Luda Peters  Clinic Number: [id number]    Therapy Diagnosis:   Encounter Diagnoses   Name Primary?    Impaired strength of hip muscles Yes    Gait abnormality      Physician: Lizz Neely DO    Visit Date: 2/2/2024    Physician Orders: PT Eval and Treat   QuestionAnswer Post Surgical?No Eval and TreatYes Type of TherapyAquatic Therapy  Medical Diagnosis from Referral:   Diagnosis   M70.61 (ICD-10-CM) - Greater trochanteric bursitis of right hip   M79.604 (ICD-10-CM) - Right leg pain      Evaluation Date: 12/27/2023  Authorization Period Expiration: 12/31/2023, 12/31/2024   Plan of Care Expiration: 2/23/2024, 3/15/2024  Progress Note Due: 1/26/2024, 3/1/2024  Date of Surgery: na  Visit # / Visits authorized: 1/ 1, 1/20  FOTO: 2/ 3 Eval, 2/2/2024     Precautions: Standard   Functional Level Prior to Evaluation:  Pt has not received therapy other than eval.    SUBJECTIVE     Update:   Patient reports: Her right hip and thigh is extremely hurting and Left leg hurts also. Late d/t traffic. The family had been sick.  She was not compliant with home exercise program.  Response to previous treatment: no adverse effect  Functional change: TBD     Pain: 5/10  Location: right thigh and hip      OBJECTIVE     Update:   Objective Measures updated at progress report unless specified.      2/2/2024:  Observation: Pt arrives in w/c. She is able to use RW in clinic with PT SBA.     Posture: impaired     Hip Range of Motion:    Left active Left Passive Right active  Right Passive   Flexion Limited 25% Limited 10% wfl Limited 10%   Abduction   Limited 10% wfl Limited 10%   Extension   Limited 10% wfl Limited 10%   Ext. Rotation   Limited 10% wfl Limited 10%   Int. Rotation   Limited 10% wfl Limited 10%      Lower Extremity Strength  Right LE   Left LE     Knee extension: 4/5 Knee extension: 4/5   Knee flexion: 4-/5 Knee flexion: 4-/5    Hip flexion: 3+/5 Hip flexion: 3+/5   Hip Internal Rotation:       Hip Internal Rotation:        Hip External Rotation:      Hip External Rotation:        Hip extension:  3-/5 Hip extension: 3-/5   Hip abduction: 3/5 Hip abduction: 3+/5   Hip adduction: 3-/5 Hip adduction 3-/5   Ankle dorsiflexion: 4-/5 Ankle dorsiflexion: 4-/5   Ankle plantarflexion: 4-/5 Ankle plantarflexion: 4-/5      Palpation: pos right ITB, glutes        Intake Outcome Measure for FOTO HIP Survey     Therapist reviewed FOTO scores for Luda Peters on 2/2/2024.   FOTO report - see Media section or FOTO account episode details.     Intake Score: 29  Goal: 44           ASSESSMENT     Update:   Luda returns due for reassessment d/t family was ill and out of caution. Late arrival to and was unable to given HEP d/t level of pain and tolerance to activity. Pt was in a lot of pain and was loud as a result demonstrating her level of pain. She was willing to try iso ex. Pt was painful in bilateral Les. She has had imaging of Left ankle. Pt is limited by multiple pains at this time. Will proceed to improve mobility as tolerated. Plan extended d/t re-starting and pt has not returned prior since eval.     Luda Is not progressing well towards her goals.   Patient prognosis is Fair.      Patient will continue to benefit from skilled outpatient physical therapy to address the deficits listed in the problem list box on initial evaluation, provide pt/family education and to maximize pt's level of independence in the home and community environment.      Patient's spiritual, cultural and educational needs considered and pt agreeable to plan of care and goals.     Anticipated barriers to physical therapy: age, health, chronicity    Previous Short Term Goals Status:     Short Term Goals: 4 weeks   -Pt able to walk using RW without limp and SV.  -LE strength grossly 3/5 for safety.  -(I) in home program to be progressed.     New Short Term Goals Status:    same, eval only received.  Long Term Goal Status: continue per initial plan of care.  Reasons for Recertification of Therapy:   Pt has not been able to receive proper tx d/t family illness.    GOALS  Goals: (ongoing)  Short Term Goals: 4 weeks   -Pt able to walk using RW without limp and SV.  -LE strength grossly 3/5 for safety.  -(I) in home program to be progressed.     Long Term Goals: 8 weeks (ongoing)  -LE strength grossly 3+/5 for dec pain.  -(I) HEP for d/c.    PLAN     Updated Plan of care Certification: 2/2/2024 to 3/15/2024     Outpatient Physical Therapy 1-2 times weekly for 8 weeks to include the following interventions: Gait Training, Manual Therapy, Moist Heat/ Ice, Neuromuscular Re-ed, Patient Education, Self Care, Therapeutic Activities, Therapeutic Exercise, and Ultrasound.     Dasha Zhang, PT

## 2024-02-23 ENCOUNTER — CLINICAL SUPPORT (OUTPATIENT)
Dept: REHABILITATION | Facility: HOSPITAL | Age: 79
End: 2024-02-23
Payer: MEDICARE

## 2024-02-23 DIAGNOSIS — R29.898 IMPAIRED STRENGTH OF HIP MUSCLES: Primary | ICD-10-CM

## 2024-02-23 DIAGNOSIS — R26.9 GAIT ABNORMALITY: ICD-10-CM

## 2024-02-23 PROCEDURE — 97112 NEUROMUSCULAR REEDUCATION: CPT | Mod: PO | Performed by: PHYSICAL THERAPIST

## 2024-02-23 PROCEDURE — 97110 THERAPEUTIC EXERCISES: CPT | Mod: PO | Performed by: PHYSICAL THERAPIST

## 2024-02-23 NOTE — PROGRESS NOTES
[facility]   Physical Therapy Treatment Note      Name: Luda Peters  Clinic Number: [id number]    Therapy Diagnosis:   Encounter Diagnoses   Name Primary?    Impaired strength of hip muscles Yes    Gait abnormality      Physician: Lizz Neely DO    Visit Date: 2/23/2024    Physician Orders: PT Eval and Treat   QuestionAnswer Post Surgical?No Eval and TreatYes Type of TherapyAquatic Therapy  Medical Diagnosis from Referral:   Diagnosis   M70.61 (ICD-10-CM) - Greater trochanteric bursitis of right hip   M79.604 (ICD-10-CM) - Right leg pain      Evaluation Date: 12/27/2023  Authorization Period Expiration: 12/31/2023, 12/31/2024   Plan of Care Expiration: 2/23/2024, 3/15/2024  Progress Note Due: 1/26/2024, 3/1/2024  Date of Surgery: na  Visit # / Visits authorized: 1/ 1, 2/20  FOTO: 2/ 3 Eval, 2/2/2024     Precautions: Standard           Past Medical History:   Diagnosis Date    Anticoagulant long-term use      Anxiety 9/26/2016    Back pain      Closed fracture of second lumbar vertebra with routine healing 12/7/2015    COPD (chronic obstructive pulmonary disease) 9/26/2016    Emphysema of lung      Encounter for blood transfusion      Gastric ulcer      Hypertension           Time In: 1200  Time Out: 1305  Total Billable Time: 54 minutes    PTA Visit #: 0/5     Daughter: Gabrielle    Subjective     Patient reports: general soreness to touch. She missed 2 weeks of therapy as she was spending time with her son not local.  She was not compliant with home exercise program.  Response to previous treatment: no adverse effect  Functional change: TBD    Pain: 8/10  Location: right thigh and hip      Objective      Objective Measures updated at progress report unless specified.     2/2/2024:  Observation: Pt arrives in w/c. She is able to use RW in clinic with PT SBA.     Posture: impaired     Hip Range of Motion:    Left active Left Passive Right active  Right Passive   Flexion  Limited 25% Limited 10% wfl Limited 10%   Abduction   Limited 10% wfl Limited 10%   Extension   Limited 10% wfl Limited 10%   Ext. Rotation   Limited 10% wfl Limited 10%   Int. Rotation   Limited 10% wfl Limited 10%      Lower Extremity Strength  Right LE   Left LE     Knee extension: 4/5 Knee extension: 4/5   Knee flexion: 4-/5 Knee flexion: 4-/5   Hip flexion: 3+/5 Hip flexion: 3+/5   Hip Internal Rotation:       Hip Internal Rotation:        Hip External Rotation:      Hip External Rotation:        Hip extension:  3-/5 Hip extension: 3-/5   Hip abduction: 3/5 Hip abduction: 3+/5   Hip adduction: 3-/5 Hip adduction 3-/5   Ankle dorsiflexion: 4-/5 Ankle dorsiflexion: 4-/5   Ankle plantarflexion: 4-/5 Ankle plantarflexion: 4-/5      Palpation: pos right ITB, glutes        Intake Outcome Measure for FOTO HIP Survey     Therapist reviewed FOTO scores for Luda Peters on 2024.   FOTO report - see Media section or FOTO account episode details.     Intake Score: 29  Goal: 44        Treatment     Luda received the treatments listed below:      therapeutic exercises to develop strength, endurance, ROM, flexibility, and posture for 08 minutes including:  Attempted 2 recumbent bikes and nu step at pt request. Poor tolerance to activities. Discontinued.    manual therapy techniques: 0 were applied to the: 0 for 0 minutes, includin    neuromuscular re-education activities to improve: Balance, Sense, Proprioception, and Posture for 40 minutes. The following activities were included:  PERFORMED BILATERALLY:  AROM heel slide X 10  Lying glute sets x 20  Quad set x 20 L, x 17 on R  APT/PPT x 10-np  Pillow squeezes x 20  SAQ 2 x 10   LONG ARC QUAD 2 x 10  Reclined march x 10  Seated march, limited ROM right, x 10  Seated HR-not performed  Sit to stand x 8    Pt declined a few reps in ex.    HEP TO BE GIVEN AT NEXT APPT. Pt states already doing many of the ex at home.    Pt needing maximum cues for contraction, but  then able to carry out. Limited by gross leg pain.    therapeutic activities to improve functional performance for 0  minutes, including:    gait training to improve functional mobility and safety for 10 minutes, including:  Clinic ambulation with RW CGA 1 lap    Patient Education and Home Exercises       Education provided:   - Caregiver education    Written Home Exercises Provided:  too be given next visit . Exercises were reviewed and Luda was able to demonstrate them prior to the end of the session.  Luda demonstrated poor understanding of the education provided. See Electronic Medical Record under Patient Instructions for exercises provided during therapy sessions    Assessment     Luda continues in a lot of general muscular pain, but did a bit better with tolerance today. Pt is limited by multiple pains at this time. Will proceed to improve mobility as tolerated. Plan extended d/t re-starting and pt has not returned prior since eval.    Luda Is not progressing well towards her goals.   Patient prognosis is Fair.     Patient will continue to benefit from skilled outpatient physical therapy to address the deficits listed in the problem list box on initial evaluation, provide pt/family education and to maximize pt's level of independence in the home and community environment.     Patient's spiritual, cultural and educational needs considered and pt agreeable to plan of care and goals.     Anticipated barriers to physical therapy: age, health, chronicity     Goals: (ongoing)  Short Term Goals: 4 weeks   -Pt able to walk using RW without limp and SV.  -LE strength grossly 3/5 for safety.  -(I) in home program to be progressed.     Long Term Goals: 8 weeks (ongoing)  -LE strength grossly 3+/5 for dec pain.  -(I) HEP for d/c.    Plan     Plan of care Certification: 2/2/2024 to 3/15/2024     Outpatient Physical Therapy 1-2 times weekly for 8 weeks to include the following interventions: Gait Training, Manual Therapy,  Moist Heat/ Ice, Neuromuscular Re-ed, Patient Education, Self Care, Therapeutic Activities, Therapeutic Exercise, and Ultrasound.     Dasha Zhang, PT

## 2024-03-04 PROBLEM — J96.22 ACUTE ON CHRONIC RESPIRATORY FAILURE WITH HYPOXIA AND HYPERCAPNIA: Status: ACTIVE | Noted: 2022-01-24

## 2024-03-04 PROBLEM — I5A MYOCARDIAL INJURY: Status: ACTIVE | Noted: 2024-03-04

## 2024-03-04 PROBLEM — J96.21 ACUTE ON CHRONIC RESPIRATORY FAILURE WITH HYPOXIA AND HYPERCAPNIA: Status: ACTIVE | Noted: 2022-01-24

## 2024-03-04 PROBLEM — J18.9 PNEUMONIA OF BOTH LUNGS DUE TO INFECTIOUS ORGANISM: Status: ACTIVE | Noted: 2024-03-04

## 2024-03-10 ENCOUNTER — PATIENT MESSAGE (OUTPATIENT)
Dept: FAMILY MEDICINE | Facility: CLINIC | Age: 79
End: 2024-03-10
Payer: MEDICARE

## 2024-03-12 ENCOUNTER — OFFICE VISIT (OUTPATIENT)
Dept: FAMILY MEDICINE | Facility: CLINIC | Age: 79
End: 2024-03-12
Payer: MEDICARE

## 2024-03-12 ENCOUNTER — HOSPITAL ENCOUNTER (OUTPATIENT)
Dept: RADIOLOGY | Facility: HOSPITAL | Age: 79
Discharge: HOME | End: 2024-03-12
Attending: FAMILY MEDICINE
Payer: MEDICARE

## 2024-03-12 VITALS
OXYGEN SATURATION: 79 % | SYSTOLIC BLOOD PRESSURE: 116 MMHG | HEART RATE: 64 BPM | WEIGHT: 161.38 LBS | HEIGHT: 62 IN | BODY MASS INDEX: 29.7 KG/M2 | DIASTOLIC BLOOD PRESSURE: 70 MMHG

## 2024-03-12 DIAGNOSIS — N18.31 CHRONIC KIDNEY DISEASE, STAGE 3A: ICD-10-CM

## 2024-03-12 DIAGNOSIS — Z86.73 HISTORY OF LACUNAR CEREBROVASCULAR ACCIDENT (CVA): ICD-10-CM

## 2024-03-12 DIAGNOSIS — I70.0 ATHEROSCLEROSIS OF AORTA: ICD-10-CM

## 2024-03-12 DIAGNOSIS — J18.9 PNEUMONIA OF BOTH LUNGS DUE TO INFECTIOUS ORGANISM, UNSPECIFIED PART OF LUNG: Primary | ICD-10-CM

## 2024-03-12 DIAGNOSIS — J96.10 CHRONIC RESPIRATORY FAILURE, UNSPECIFIED WHETHER WITH HYPOXIA OR HYPERCAPNIA: ICD-10-CM

## 2024-03-12 DIAGNOSIS — I27.20 PULMONARY HYPERTENSION, UNSPECIFIED: ICD-10-CM

## 2024-03-12 DIAGNOSIS — J18.9 PNEUMONIA OF BOTH LUNGS DUE TO INFECTIOUS ORGANISM, UNSPECIFIED PART OF LUNG: ICD-10-CM

## 2024-03-12 DIAGNOSIS — F03.90 DEMENTIA, UNSPECIFIED DEMENTIA SEVERITY, UNSPECIFIED DEMENTIA TYPE, UNSPECIFIED WHETHER BEHAVIORAL, PSYCHOTIC, OR MOOD DISTURBANCE OR ANXIETY: ICD-10-CM

## 2024-03-12 PROBLEM — L89.612 PRESSURE INJURY OF RIGHT HEEL, STAGE 2: Status: ACTIVE | Noted: 2024-03-12

## 2024-03-12 PROCEDURE — 71046 X-RAY EXAM CHEST 2 VIEWS: CPT | Mod: TC,PN

## 2024-03-12 PROCEDURE — 71046 X-RAY EXAM CHEST 2 VIEWS: CPT | Mod: 26,,, | Performed by: RADIOLOGY

## 2024-03-12 PROCEDURE — 99214 OFFICE O/P EST MOD 30 MIN: CPT | Mod: S$GLB,,, | Performed by: FAMILY MEDICINE

## 2024-03-12 PROCEDURE — 99999 PR PBB SHADOW E&M-EST. PATIENT-LVL III: CPT | Mod: PBBFAC,,, | Performed by: FAMILY MEDICINE

## 2024-03-12 NOTE — PROGRESS NOTES
Subjective:       Patient ID: Luda Peters is a 78 y.o. female.    Chief Complaint: Follow-up (Last Monday discharge Thursday last week [facility] possible oxygen)      Luda Peters is in the office for hosp f/u.    Follow-up  Associated symptoms include arthralgias and weakness. Pertinent negatives include no chest pain, fatigue, headaches, joint swelling or neck pain.     Admitted to [facility] last week for 3 days for chad pna and copd exacerbation.   Medically managed with o2, nebs, steroids, abx. Completed abx and pred taper. Maintaining neb treatments, currently BID.   Past Medical History:   Diagnosis Date    Anticoagulant long-term use     Anxiety 9/26/2016    Back pain     Closed fracture of second lumbar vertebra with routine healing 12/7/2015    COPD (chronic obstructive pulmonary disease) 9/26/2016    Emphysema of lung     Encounter for blood transfusion     Gastric ulcer     Hypertension      No new problems since discharge home. Maintaining supplemental O2 at 2.5L, approx 80% of her wakeful time.   Mental status near-normal, still some occ issues with wordfinding.     Current Outpatient Medications:     acetaminophen (TYLENOL) 500 MG tablet, Take 500 mg by mouth Daily., Disp: , Rfl:     acetaminophen (TYLENOL) 650 MG TbSR, Take 1,300 mg by mouth 2 (two) times daily., Disp: , Rfl:     albuterol (PROVENTIL/VENTOLIN HFA) 90 mcg/actuation inhaler, Inhale 2 puffs into the lungs every 6 (six) hours as needed for Wheezing or Shortness of Breath. Rescue, Disp: 3 g, Rfl: 6    albuterol-ipratropium (DUO-NEB) 2.5 mg-0.5 mg/3 mL nebulizer solution, Take 3 mLs by nebulization every 6 (six) hours as needed for Wheezing. Rescue, Disp: 75 mL, Rfl: 5    allopurinoL (ZYLOPRIM) 100 MG tablet, TAKE 1 TABLET(100 MG) BY MOUTH EVERY DAY, Disp: 90 tablet, Rfl: 1    ARIPiprazole (ABILIFY) 2 MG Tab, Take 1 tablet (2 mg total) by mouth once daily., Disp: 90 tablet, Rfl: 3    ascorbic acid, vitamin C, (VITAMIN C) 1000  MG tablet, Take 1,000 mg by mouth daily as needed (immune support)., Disp: , Rfl:     atorvastatin (LIPITOR) 20 MG tablet, Take 1 tablet (20 mg total) by mouth once daily., Disp: 90 tablet, Rfl: 3    budesonide (PULMICORT) 0.5 mg/2 mL nebulizer solution, Take 2 mLs (0.5 mg total) by nebulization 2 (two) times daily as needed (wheezing). USE 1 VIAL VIA NEBULIZER EVERY 12 HOURS, Disp: 100 mL, Rfl: 2    clopidogreL (PLAVIX) 75 mg tablet, Take 1 tablet (75 mg total) by mouth once daily., Disp: 90 tablet, Rfl: 1    diclofenac sodium (VOLTAREN) 1 % Gel, APPLY 2 GRAMS TOPICALLY TO THE AFFECTED AREA TWICE DAILY (Patient taking differently: Apply 2 g topically 2 (two) times daily. APPLY 2 GRAMS TOPICALLY TO THE AFFECTED AREA TWICE DAILY), Disp: 100 g, Rfl: 1    docusate sodium (COLACE) 100 MG capsule, Take 100 mg by mouth 2 (two) times daily., Disp: , Rfl:     EScitalopram oxalate (LEXAPRO) 20 MG tablet, Take 1 tablet (20 mg total) by mouth every evening., Disp: 90 tablet, Rfl: 3    fluticasone-umeclidin-vilanter (TRELEGY ELLIPTA) 100-62.5-25 mcg DsDv, Inhale 1 puff into the lungs once daily., Disp: 180 each, Rfl: 3    furosemide (LASIX) 40 MG tablet, TAKE 1 TABLET BY MOUTH TWICE DAILY (Patient taking differently: Take 40 mg by mouth 2 (two) times a day.), Disp: 180 tablet, Rfl: 3    gabapentin (NEURONTIN) 600 MG tablet, Take 1 tablet (600 mg total) by mouth 2 (two) times daily., Disp: 180 tablet, Rfl: 1    metoprolol tartrate (LOPRESSOR) 25 MG tablet, TAKE 1 TABLET BY MOUTH TWICE DAILY (Patient taking differently: Take 25 mg by mouth 2 (two) times daily.), Disp: 180 tablet, Rfl: 3    multivitamin (THERAGRAN) per tablet, Take 1 tablet by mouth daily as needed (immune support)., Disp: , Rfl:     naproxen sodium (ANAPROX) 220 MG tablet, Take 220 mg by mouth Daily., Disp: , Rfl:     nortriptyline (PAMELOR) 25 MG capsule, Take 1 capsule (25 mg total) by mouth every evening., Disp: 90 capsule, Rfl: 3    omega-3 fatty acids/fish  oil (FISH OIL-OMEGA-3 FATTY ACIDS) 300-1,000 mg capsule, Take 1 capsule by mouth once daily., Disp: , Rfl:     pantoprazole (PROTONIX) 40 MG tablet, TAKE ONE TABLET BY MOUTH TWICE DAILY, Disp: 180 tablet, Rfl: 1    zinc acetate 50 mg (zinc) Cap, Take 1 tablet by mouth daily as needed (immune support)., Disp: , Rfl:     The 10-year ASCVD risk score (Leny ALY, et al., 2019) is: 18.1%    Values used to calculate the score:      Age: 78 years      Sex: Female      Is Non- : No      Diabetic: No      Tobacco smoker: No      Systolic Blood Pressure: 116 mmHg      Is BP treated: No      HDL Cholesterol: 47 mg/dL      Total Cholesterol: 174 mg/dL     Lab Results   Component Value Date    HGBA1C 5.6 10/09/2023    HGBA1C 5.7 (H) 05/05/2023    HGBA1C 5.9 (H) 10/15/2021     Lab Results   Component Value Date    LDLCALC 97.8 05/05/2023    CREATININE 1.0 03/12/2024   Labs 2024 rev.   Imaging 2024 rev.   Hosp discharge note rev.    Review of Systems   Constitutional:  Negative for fatigue and unexpected weight change.   HENT:  Negative for postnasal drip and rhinorrhea.    Respiratory:  Positive for shortness of breath and wheezing.    Cardiovascular:  Negative for chest pain and palpitations.   Gastrointestinal:  Negative for constipation and diarrhea.   Genitourinary:  Negative for difficulty urinating, dysuria, hematuria and menstrual problem.   Musculoskeletal:  Positive for arthralgias. Negative for joint swelling and neck pain.   Neurological:  Positive for weakness. Negative for headaches.   Psychiatric/Behavioral:  Positive for confusion. Negative for dysphoric mood.            Objective:      Physical Exam  Vitals and nursing note reviewed.   Constitutional:       General: She is not in acute distress.     Appearance: Normal appearance. She is well-developed.   HENT:      Head: Normocephalic and atraumatic.   Eyes:      General: No scleral icterus.        Right eye: No discharge.         Left eye:  No discharge.      Conjunctiva/sclera: Conjunctivae normal.   Cardiovascular:      Rate and Rhythm: Normal rate.   Pulmonary:      Effort: Pulmonary effort is normal. No respiratory distress.      Breath sounds: Decreased air movement present. Decreased breath sounds present.   Skin:     General: Skin is warm and dry.   Neurological:      General: No focal deficit present.      Mental Status: She is alert and oriented to person, place, and time.   Psychiatric:         Mood and Affect: Mood normal.         Behavior: Behavior normal.             Screening recommendations appropriate to age and health status were reviewed.    Assessment & Plan:    Pneumonia of both lungs due to infectious organism, unspecified part of lung  -     X-Ray Chest PA And Lateral; Future; Expected date: 03/12/2024    Dementia, unspecified dementia severity, unspecified dementia type, unspecified whether behavioral, psychotic, or mood disturbance or anxiety  Comments:  still a little foggy post-admission, cont routine home care    Chronic respiratory failure, unspecified whether with hypoxia or hypercapnia  Comments:  cont supplemental O2 and bipap  Orders:  -     CBC Without Differential; Future; Expected date: 03/12/2024  -     Comprehensive Metabolic Panel; Future; Expected date: 03/12/2024    Pulmonary hypertension, unspecified  Comments:  cont supplemental O2    Chronic kidney disease, stage 3a  Comments:  water hydration, stable gfr    Atherosclerosis of aorta  Comments:  stable    History of lacunar cerebrovascular accident (CVA)  -     CT Head Without Contrast; Future; Expected date: 03/12/2024    Appears resolved. Update cxr and cont supplemental O2 continuous.   Given prior hx of stroke and changed mental status sx during initial admission, repeat CT head.

## 2024-03-14 NOTE — PROGRESS NOTES
[facility]   Physical Therapy Treatment Note      Name: Luda Peters  Clinic Number: [id number]    Therapy Diagnosis:   Encounter Diagnoses   Name Primary?    Impaired strength of hip muscles Yes    Gait abnormality      Physician: Lizz Neely DO    Visit Date: 3/15/2024    Physician Orders: PT Eval and Treat   QuestionAnswer Post Surgical?No Eval and TreatYes Type of TherapyAquatic Therapy  Medical Diagnosis from Referral:   Diagnosis   M70.61 (ICD-10-CM) - Greater trochanteric bursitis of right hip   M79.604 (ICD-10-CM) - Right leg pain      Evaluation Date: 12/27/2023  Authorization Period Expiration: 12/31/2023, 12/31/2024   Plan of Care Expiration: 2/23/2024, 3/15/2024  Progress Note Due: 1/26/2024, 3/1/2024  Date of Surgery: na  Visit # / Visits authorized: 1/ 1, 3/20  FOTO: 2/ 3 Eval, 2/2/2024, 3/15/2024     Precautions: Standard           Past Medical History:   Diagnosis Date    Anticoagulant long-term use      Anxiety 9/26/2016    Back pain      Closed fracture of second lumbar vertebra with routine healing 12/7/2015    COPD (chronic obstructive pulmonary disease) 9/26/2016    Emphysema of lung      Encounter for blood transfusion      Gastric ulcer      Hypertension           Time In: 1100  Time Out: 1156  Total Billable Time: 45 minutes    PTA Visit #: 0/5     Daughter: Gabrielle    Subjective     Patient reports: multiple joint pain. last seen 3 weeks ago d/t hospitalization and health and can attend once weekly d/t transportation.   She was compliant with home exercise program.  Response to previous treatment: no adverse effect  Functional change: TBD    Pain: 5/10  Location: right thigh and hip      Objective      Objective Measures updated at progress report unless specified.     3/15/2024:  Observation: Pt arrives in w/c. She is able to use RW in clinic with PT SBA.     Posture: impaired     Hip Range of Motion:    Left active Left Passive Right active  Right  Passive   Flexion Limited 25% Limited 10% wfl Limited 10%   Abduction   Limited 10% wfl Limited 10%   Extension   Limited 10% wfl Limited 10%   Ext. Rotation   Limited 10% wfl Limited 10%   Int. Rotation   Limited 10% wfl Limited 10%      Lower Extremity Strength  Right LE   Left LE     Knee extension: 4/5 Knee extension: 4/5   Knee flexion: 4-/5 Knee flexion: 4-/5   Hip flexion: 3+/5 Hip flexion: 3+/5   Hip Internal Rotation:       Hip Internal Rotation:        Hip External Rotation:      Hip External Rotation:        Hip extension:  3-/5 Hip extension: 3-/5   Hip abduction: 3+/5 Hip abduction: 3+/5   Hip adduction: 3/5 Hip adduction 3/5   Ankle dorsiflexion: 4-/5 Ankle dorsiflexion: 4-/5   Ankle plantarflexion: 4-/5 Ankle plantarflexion: 4-/5      Patient has pain during MMT hand placement and really sensitive at her lower leg, but this is not new and she has been seen in the ER recently.    Palpation: pos right ITB, glutes        Intake Outcome Measure for FOTO HIP Survey     Therapist reviewed FOTO scores for Luda Peters on 2024.   FOTO report - see Media section or FOTO account episode details.     Intake Score: 29  Goal: 44  3/15/2024: 40 (+11)        Treatment     Luda received the treatments listed below:      therapeutic exercises to develop strength, endurance, ROM, flexibility, and posture for 18 minutes including:  Endurance training UBE x 5 mins  Endurance training walking with RW 8 mins in clinic  Row on precor 10# 2 x 10    manual therapy techniques: 0 were applied to the: 0 for 0 minutes, includin    neuromuscular re-education activities to improve: Balance, Sense, Proprioception, and Posture for 33 minutes. The following activities were included:  PERFORMED BILATERALLY:  Seated hip adduction iso with ball 2 x 45  Standing march 2 x 5  Standing hip Abduction 2 x 5  Standing HSC 2 x 5  Standing forward/backward toe tap 2 x 5  Standing toe raises 2 x 10  Right shoulder flexion with  cesario 1# x 10    Education in HH chores, safety, and activities with pt and daughter.  She showed that she has been performing most of the above at a rate of 5 reps. Encouraged twice a day 2 x 5.  Pt requires freq rest breaks d/t h/o pneumonia and other health problems. Recently in hospital.    therapeutic activities to improve functional performance for 0  minutes, including:    gait training to improve functional mobility and safety for 00 minutes, including:  Clinic ambulation with RW CGA 1 lap    Patient Education and Home Exercises       Education provided:   - Caregiver education    Written Home Exercises Provided:  continue as discussed . Exercises were reviewed and Luda was able to demonstrate them prior to the end of the session.  Luda demonstrated poor understanding of the education provided. See Electronic Medical Record under Patient Instructions for exercises provided during therapy sessions    Assessment     Luda continues in a lot of general muscular pain, but did a bit better with tolerance today. Pt is limited by multiple pains at this time. Will proceed to improve mobility as tolerated. Reassessed d/t time away with health. She demos mild improvements in pain and tolerance to ex. Right LE is in EXTERNAL ROTATION.    Luda Is not progressing well towards her goals.   Patient prognosis is Fair.     Patient will continue to benefit from skilled outpatient physical therapy to address the deficits listed in the problem list box on initial evaluation, provide pt/family education and to maximize pt's level of independence in the home and community environment.     Patient's spiritual, cultural and educational needs considered and pt agreeable to plan of care and goals.     Anticipated barriers to physical therapy: age, health, chronicity     Goals: (ongoing)  Short Term Goals: 4 weeks   -Pt able to walk using RW without limp and SV.  -LE strength grossly 3/5 for safety.  -(I) in home program to be  progressed.     Long Term Goals: 8 weeks (ongoing)  -LE strength grossly 3+/5 for dec pain.  -(I) HEP for d/c.    Plan     Plan of care Certification: 2/2/2024 to 3/15/2024     Outpatient Physical Therapy 1-2 times weekly for 8 weeks to include the following interventions: Gait Training, Manual Therapy, Moist Heat/ Ice, Neuromuscular Re-ed, Patient Education, Self Care, Therapeutic Activities, Therapeutic Exercise, and Ultrasound.     Dasha Zhang, PT

## 2024-03-15 ENCOUNTER — CLINICAL SUPPORT (OUTPATIENT)
Dept: REHABILITATION | Facility: HOSPITAL | Age: 79
End: 2024-03-15
Payer: MEDICARE

## 2024-03-15 DIAGNOSIS — R29.898 IMPAIRED STRENGTH OF HIP MUSCLES: Primary | ICD-10-CM

## 2024-03-15 DIAGNOSIS — R26.9 GAIT ABNORMALITY: ICD-10-CM

## 2024-03-15 PROCEDURE — 97110 THERAPEUTIC EXERCISES: CPT | Mod: PO | Performed by: PHYSICAL THERAPIST

## 2024-03-15 PROCEDURE — 97112 NEUROMUSCULAR REEDUCATION: CPT | Mod: PO | Performed by: PHYSICAL THERAPIST

## 2024-03-19 ENCOUNTER — TELEPHONE (OUTPATIENT)
Dept: FAMILY MEDICINE | Facility: CLINIC | Age: 79
End: 2024-03-19
Payer: MEDICARE

## 2024-03-19 DIAGNOSIS — J18.9 PNEUMONIA OF BOTH LUNGS DUE TO INFECTIOUS ORGANISM, UNSPECIFIED PART OF LUNG: Primary | ICD-10-CM

## 2024-03-19 NOTE — TELEPHONE ENCOUNTER
Repeat cbc this week. Her CXR looked better from previous, but I'd like to make sure her white count is coming down as well.

## 2024-03-21 DIAGNOSIS — M25.561 ACUTE PAIN OF RIGHT KNEE: ICD-10-CM

## 2024-03-22 ENCOUNTER — HOSPITAL ENCOUNTER (OUTPATIENT)
Dept: RADIOLOGY | Facility: HOSPITAL | Age: 79
Discharge: HOME | End: 2024-03-22
Attending: FAMILY MEDICINE
Payer: MEDICARE

## 2024-03-22 DIAGNOSIS — Z86.73 HISTORY OF LACUNAR CEREBROVASCULAR ACCIDENT (CVA): ICD-10-CM

## 2024-03-22 PROCEDURE — 70450 CT HEAD/BRAIN W/O DYE: CPT | Mod: TC,PO

## 2024-03-22 PROCEDURE — 70450 CT HEAD/BRAIN W/O DYE: CPT | Mod: 26,,, | Performed by: RADIOLOGY

## 2024-03-22 RX ORDER — DICLOFENAC SODIUM 10 MG/G
GEL TOPICAL
Qty: 100 G | Refills: 1 | Status: SHIPPED | OUTPATIENT
Start: 2024-03-22 | End: 2024-06-07

## 2024-03-22 NOTE — TELEPHONE ENCOUNTER
Refill Routing Note   Medication(s) are not appropriate for processing by [facility] for the following reason(s):        Outside of protocol    [facility] action(s):  Route     Requires labs : Yes             Appointments  past 12m or future 3m with PCP    Date Provider   Last Visit   3/12/2024 Nuzhat Kline MD   Next Visit   Visit date not found Nuzhat Kline MD   ED visits in past 90 days: 0        Note composed:9:08 AM 03/22/2024

## 2024-03-22 NOTE — TELEPHONE ENCOUNTER
Care Due:                  Date            Visit Type   Department     Provider  --------------------------------------------------------------------------------                                HOSPITAL     [facility]  Last Visit: 03-      FOLLOW UP    MEDICINE       Nuzhat Kline  Next Visit: None Scheduled  None         None Found                                                            Last  Test          Frequency    Reason                     Performed    Due Date  --------------------------------------------------------------------------------    Lipid Panel.  12 months..  atorvastatin.............  05- 04-    Uric Acid...  12 months..  allopurinoL..............  Not Found    Overdue    [employer] Embedded Care Due Messages. Reference number: [id number].   3/21/2024 7:03:39 PM CDT

## 2024-03-28 ENCOUNTER — TELEPHONE (OUTPATIENT)
Dept: FAMILY MEDICINE | Facility: CLINIC | Age: 79
End: 2024-03-28
Payer: MEDICARE

## 2024-03-28 NOTE — TELEPHONE ENCOUNTER
Called pt to give results and schedule appointments, no answer left vm to call us back. - Bailey GAYLE

## 2024-04-09 NOTE — TELEPHONE ENCOUNTER
No care due was identified.  [facility] Embedded Care Due Messages. Reference number: [id number].   4/09/2024 4:46:49 PM CDT

## 2024-04-10 NOTE — TELEPHONE ENCOUNTER
Refill Routing Note   Medication(s) are not appropriate for processing by [facility] for the following reason(s):        Outside of protocol    [facility] action(s):  Route        Medication Therapy Plan: Total daily dose exceeds maintenance dosing for PPI      Appointments  past 12m or future 3m with PCP    Date Provider   Last Visit   3/12/2024 Nuzhat Kline MD   Next Visit   Visit date not found Nuzhat Kline MD   ED visits in past 90 days: 0        Note composed:12:47 PM 04/10/2024

## 2024-04-11 RX ORDER — PANTOPRAZOLE SODIUM 40 MG/1
40 TABLET, DELAYED RELEASE ORAL 2 TIMES DAILY
Qty: 180 TABLET | Refills: 1 | Status: SHIPPED | OUTPATIENT
Start: 2024-04-11

## 2024-04-15 NOTE — TELEPHONE ENCOUNTER
No care due was identified.  [facility] Embedded Care Due Messages. Reference number: [id number].   4/15/2024 5:31:49 PM CDT

## 2024-04-18 RX ORDER — ALLOPURINOL 100 MG/1
100 TABLET ORAL
Qty: 90 TABLET | Refills: 1 | Status: SHIPPED | OUTPATIENT
Start: 2024-04-18

## 2024-05-06 NOTE — TELEPHONE ENCOUNTER
No care due was identified.  [facility] Embedded Care Due Messages. Reference number: [id number].   5/06/2024 4:19:18 PM CDT

## 2024-05-06 NOTE — TELEPHONE ENCOUNTER
Please approve for GABAPENTIN 600MG TABLETS     Last OV 03/12/24  Last refill date 02/05/24  Last labs 03/19/24    Next appt 05/17/24

## 2024-05-07 RX ORDER — GABAPENTIN 600 MG/1
600 TABLET ORAL 2 TIMES DAILY
Qty: 180 TABLET | Refills: 1 | Status: SHIPPED | OUTPATIENT
Start: 2024-05-07

## 2024-05-13 NOTE — TELEPHONE ENCOUNTER
No care due was identified.  [facility] Embedded Care Due Messages. Reference number: [id number].   5/13/2024 5:02:55 PM CDT

## 2024-05-14 RX ORDER — CLOPIDOGREL BISULFATE 75 MG/1
75 TABLET ORAL
Qty: 90 TABLET | Refills: 3 | Status: SHIPPED | OUTPATIENT
Start: 2024-05-14

## 2024-05-14 NOTE — TELEPHONE ENCOUNTER
Refill Decision Note   Luda Fran  is requesting a refill authorization.  Brief Assessment and Rationale for Refill:  Approve     Medication Therapy Plan:         Comments:     Note composed:3:02 AM 05/14/2024

## 2024-05-17 ENCOUNTER — OFFICE VISIT (OUTPATIENT)
Dept: FAMILY MEDICINE | Facility: CLINIC | Age: 79
End: 2024-05-17
Payer: MEDICARE

## 2024-05-17 VITALS
HEART RATE: 60 BPM | WEIGHT: 148.25 LBS | BODY MASS INDEX: 27.28 KG/M2 | DIASTOLIC BLOOD PRESSURE: 70 MMHG | OXYGEN SATURATION: 81 % | SYSTOLIC BLOOD PRESSURE: 118 MMHG | HEIGHT: 62 IN

## 2024-05-17 DIAGNOSIS — M54.2 NECK PAIN: ICD-10-CM

## 2024-05-17 DIAGNOSIS — J96.10 CHRONIC RESPIRATORY FAILURE, UNSPECIFIED WHETHER WITH HYPOXIA OR HYPERCAPNIA: ICD-10-CM

## 2024-05-17 DIAGNOSIS — J43.2 CENTRILOBULAR EMPHYSEMA: Primary | ICD-10-CM

## 2024-05-17 PROCEDURE — 99999 PR PBB SHADOW E&M-EST. PATIENT-LVL V: CPT | Mod: PBBFAC,,, | Performed by: FAMILY MEDICINE

## 2024-05-17 PROCEDURE — 1125F AMNT PAIN NOTED PAIN PRSNT: CPT | Mod: CPTII,S$GLB,, | Performed by: FAMILY MEDICINE

## 2024-05-17 PROCEDURE — 3074F SYST BP LT 130 MM HG: CPT | Mod: CPTII,S$GLB,, | Performed by: FAMILY MEDICINE

## 2024-05-17 PROCEDURE — 1159F MED LIST DOCD IN RCRD: CPT | Mod: CPTII,S$GLB,, | Performed by: FAMILY MEDICINE

## 2024-05-17 PROCEDURE — 3078F DIAST BP <80 MM HG: CPT | Mod: CPTII,S$GLB,, | Performed by: FAMILY MEDICINE

## 2024-05-17 PROCEDURE — 96372 THER/PROPH/DIAG INJ SC/IM: CPT | Mod: S$GLB,,, | Performed by: FAMILY MEDICINE

## 2024-05-17 PROCEDURE — 3288F FALL RISK ASSESSMENT DOCD: CPT | Mod: CPTII,S$GLB,, | Performed by: FAMILY MEDICINE

## 2024-05-17 PROCEDURE — 1160F RVW MEDS BY RX/DR IN RCRD: CPT | Mod: CPTII,S$GLB,, | Performed by: FAMILY MEDICINE

## 2024-05-17 PROCEDURE — 1101F PT FALLS ASSESS-DOCD LE1/YR: CPT | Mod: CPTII,S$GLB,, | Performed by: FAMILY MEDICINE

## 2024-05-17 PROCEDURE — 99214 OFFICE O/P EST MOD 30 MIN: CPT | Mod: 25,S$GLB,, | Performed by: FAMILY MEDICINE

## 2024-05-17 RX ORDER — TIZANIDINE 2 MG/1
4 TABLET ORAL EVERY 8 HOURS PRN
Qty: 30 TABLET | Refills: 0 | Status: SHIPPED | OUTPATIENT
Start: 2024-05-17 | End: 2024-05-27

## 2024-05-17 RX ORDER — BETAMETHASONE SODIUM PHOSPHATE AND BETAMETHASONE ACETATE 3; 3 MG/ML; MG/ML
6 INJECTION, SUSPENSION INTRA-ARTICULAR; INTRALESIONAL; INTRAMUSCULAR; SOFT TISSUE
Status: COMPLETED | OUTPATIENT
Start: 2024-05-17 | End: 2024-05-17

## 2024-05-17 RX ADMIN — BETAMETHASONE SODIUM PHOSPHATE AND BETAMETHASONE ACETATE 6 MG: 3; 3 INJECTION, SUSPENSION INTRA-ARTICULAR; INTRALESIONAL; INTRAMUSCULAR; SOFT TISSUE at 11:05

## 2024-05-17 NOTE — PROGRESS NOTES
"Subjective:       Patient ID: Luda Peters is a 79 y.o. female.    Chief Complaint: Follow-up (Left side pain major headache x 1 week ct in march which noticed mini stroks)    Follow-up  Associated symptoms include chest pain (chronic/stable, epigastric), fatigue and headaches. Pertinent negatives include no coughing, numbness or weakness.     Patient with c/o constant L sided facial pain with HA x 1 week.   Was with her son's family the last week, they had more activities and she found it a little more strenuous than usual.     Review of Systems:  Review of Systems   Constitutional:  Positive for fatigue.   Eyes:  Negative for visual disturbance.   Respiratory:  Positive for shortness of breath. Negative for cough.    Cardiovascular:  Positive for chest pain (chronic/stable, epigastric).   Gastrointestinal:  Negative for diarrhea.   Musculoskeletal:  Positive for gait problem.   Neurological:  Positive for headaches. Negative for facial asymmetry, weakness and numbness.   Psychiatric/Behavioral:  Positive for confusion.        Objective:     Vitals:    05/17/24 1046   BP: 118/70   Pulse: 60   SpO2: (!) 81%   Weight: 67.3 kg (148 lb 4.2 oz)   Height: 5' 2" (1.575 m)          Physical Exam  Vitals and nursing note reviewed.   Constitutional:       General: She is not in acute distress.     Appearance: Normal appearance. She is well-developed.   HENT:      Head: Normocephalic and atraumatic.   Eyes:      General: No scleral icterus.        Right eye: No discharge.         Left eye: No discharge.      Conjunctiva/sclera: Conjunctivae normal.   Cardiovascular:      Rate and Rhythm: Normal rate.   Pulmonary:      Effort: Pulmonary effort is normal. No respiratory distress.   Abdominal:      Palpations: Abdomen is soft.      Tenderness: There is no guarding.   Musculoskeletal:         General: No deformity or signs of injury.      Cervical back: Neck supple. Tenderness present.   Skin:     General: Skin is warm " and dry.      Findings: No rash.   Neurological:      General: No focal deficit present.      Mental Status: She is alert and oriented to person, place, and time.   Psychiatric:         Mood and Affect: Mood normal.         Behavior: Behavior normal.           Assessment & Plan:  Centrilobular emphysema  -     X-Ray Chest PA And Lateral; Future; Expected date: 05/17/2024    Neck pain  Comments:  suspcious for msk, spasm or impingment  celestone given in clinic, trial zanaflex at least at night  let me know if not improving/worsening  Orders:  -     X-Ray Cervical Spine AP And Lateral; Future; Expected date: 05/17/2024    Chronic respiratory failure, unspecified whether with hypoxia or hypercapnia  Comments:  stable, pt on continuous home O2, bipap    Other orders  -     betamethasone acetate-betamethasone sodium phosphate injection 6 mg  -     tiZANidine (ZANAFLEX) 2 MG tablet; Take 2 tablets (4 mg total) by mouth every 8 (eight) hours as needed (muscle pain).  Dispense: 30 tablet; Refill: 0

## 2024-05-23 ENCOUNTER — TELEPHONE (OUTPATIENT)
Dept: FAMILY MEDICINE | Facility: CLINIC | Age: 79
End: 2024-05-23
Payer: MEDICARE

## 2024-05-23 DIAGNOSIS — J43.2 CENTRILOBULAR EMPHYSEMA: Primary | ICD-10-CM

## 2024-05-23 NOTE — TELEPHONE ENCOUNTER
----- Message from Dianetrace Becker sent at 5/23/2024  9:42 AM CDT -----  Contact: Pt's daughter  Type: Needs Medical Advice    Who Called:  Patient's caretaker/finn  What is this regarding?:  She has the [facility] so they are needing her nebulizer represcribed ordered there. It is sounding like it is breaking and she is worried about her COPD.  Best Call Back Number:  [phone], Ms. Anthony  Additional Information:  Please call the patient's daughter back at the phone number listed above to advise. Thank you!

## 2024-05-27 NOTE — TELEPHONE ENCOUNTER
Care Due:                  Date            Visit Type   Department     Provider  --------------------------------------------------------------------------------                                EP -                              PRIMARY      [facility] FAM[facility]  Last Visit: 05-      CARE ([facility])   MEDICINE       Nuzhat Kline  Next Visit: None Scheduled  None         None Found                                                            Last  Test          Frequency    Reason                     Performed    Due Date  --------------------------------------------------------------------------------    Lipid Panel.  12 months..  atorvastatin.............  05- 04-    Uric Acid...  12 months..  allopurinoL..............  Not Found    Overdue    [employer] Embedded Care Due Messages. Reference number: [id number].   5/27/2024 6:55:32 PM CDT

## 2024-05-28 RX ORDER — NORTRIPTYLINE HYDROCHLORIDE 25 MG/1
25 CAPSULE ORAL NIGHTLY
Qty: 90 CAPSULE | Refills: 3 | Status: SHIPPED | OUTPATIENT
Start: 2024-05-28

## 2024-05-28 NOTE — TELEPHONE ENCOUNTER
Provider Staff:  Action required for this patient     Please see care gap opportunities below in Care Due Message.    Thanks!  [facility]     Appointments      Date Provider   Last Visit   5/17/2024 Nuzhat Kline MD   Next Visit   Visit date not found Nuzhat Kline MD      Refill Decision Note   uLda Peters  is requesting a refill authorization.  Brief Assessment and Rationale for Refill:  Approve     Medication Therapy Plan:         Comments:     Note composed:2:20 AM 05/28/2024

## 2024-05-29 ENCOUNTER — TELEPHONE (OUTPATIENT)
Dept: NEUROLOGY | Facility: CLINIC | Age: 79
End: 2024-05-29
Payer: MEDICARE

## 2024-05-29 NOTE — TELEPHONE ENCOUNTER
Spoke to the pt's daughter, appt rescheduled to 8/9/24 at 1400 with Kim Schrader for CVA. Date, time and location discussed. Pt's daughter is trying to schedule an open MRI for the pt before the appt.

## 2024-06-03 PROBLEM — J96.21 ACUTE ON CHRONIC RESPIRATORY FAILURE WITH HYPOXIA AND HYPERCAPNIA: Status: RESOLVED | Noted: 2022-01-24 | Resolved: 2024-06-03

## 2024-06-03 PROBLEM — J96.22 ACUTE ON CHRONIC RESPIRATORY FAILURE WITH HYPOXIA AND HYPERCAPNIA: Status: RESOLVED | Noted: 2022-01-24 | Resolved: 2024-06-03

## 2024-06-05 DIAGNOSIS — M25.561 ACUTE PAIN OF RIGHT KNEE: ICD-10-CM

## 2024-06-05 NOTE — TELEPHONE ENCOUNTER
No care due was identified.  [facility] Embedded Care Due Messages. Reference number: [id number].   6/05/2024 5:52:50 PM CDT

## 2024-06-06 NOTE — TELEPHONE ENCOUNTER
Refill Routing Note   Medication(s) are not appropriate for processing by [facility] for the following reason(s):        Outside of protocol    [facility] action(s):  Route               Appointments  past 12m or future 3m with PCP    Date Provider   Last Visit   5/17/2024 Nuzhat Kline MD   Next Visit   Visit date not found Nuzhat Kline MD   ED visits in past 90 days: 0        Note composed:9:19 AM 06/06/2024

## 2024-06-07 ENCOUNTER — TELEPHONE (OUTPATIENT)
Dept: FAMILY MEDICINE | Facility: CLINIC | Age: 79
End: 2024-06-07
Payer: MEDICARE

## 2024-06-07 RX ORDER — DICLOFENAC SODIUM 10 MG/G
GEL TOPICAL
Qty: 100 G | Refills: 1 | Status: SHIPPED | OUTPATIENT
Start: 2024-06-07

## 2024-06-07 NOTE — TELEPHONE ENCOUNTER
----- Message from Buffybarbara Douglas sent at 6/7/2024 11:15 AM CDT -----  Type:  RX Refill Request    Who Called:  Pt's Daughter Gabrielle    Refill or New Rx:  refill    RX Name and Strength:  diclofenac sodium (VOLTAREN) 1 % Gel    How is the patient currently taking it? (ex. 1XDay):  as directed    Is this a 30 day or 90 day RX:  90    Preferred Pharmacy with phone number:    [id number] [address] & [address] [address]  Phone: [phone] Fax: [phone]    Local or Mail Order:  Local    Ordering Provider:  Dr Kline    Would the patient rather a call back or a response via [facility]?  Call back    Best Call Back Number:  [phone]    Additional Information:  Pharmacy sent over request and waiting on PA. Needing this today because they are going out of town tomorrow.   Please call back to advise. Thanks!

## 2024-06-10 PROBLEM — J18.9 PNEUMONIA OF BOTH LUNGS DUE TO INFECTIOUS ORGANISM: Status: RESOLVED | Noted: 2024-03-04 | Resolved: 2024-06-10

## 2024-06-21 ENCOUNTER — TELEPHONE (OUTPATIENT)
Dept: NEUROLOGY | Facility: CLINIC | Age: 79
End: 2024-06-21
Payer: MEDICARE

## 2024-06-21 NOTE — TELEPHONE ENCOUNTER
----- Message from Cayla Mott sent at 6/21/2024  9:54 AM CDT -----  Regarding: Sooner Appointment Provider Reschedule Request  Contact: patient's daughter at [phone]  Type:  Sooner Appointment Provider Reschedule Request    Name of Caller:  patient's daughter at [phone]    Additional Information:  Please call and advise. Thank you

## 2024-07-07 NOTE — TELEPHONE ENCOUNTER
No care due was identified.  [facility] Embedded Care Due Messages. Reference number: [id number].   7/07/2024 7:10:10 AM CDT

## 2024-07-08 RX ORDER — ESCITALOPRAM OXALATE 20 MG/1
20 TABLET ORAL NIGHTLY
Qty: 90 TABLET | Refills: 3 | Status: SHIPPED | OUTPATIENT
Start: 2024-07-08

## 2024-07-08 NOTE — TELEPHONE ENCOUNTER
Refill Decision Note   Luda Peters  is requesting a refill authorization.  Brief Assessment and Rationale for Refill:  Approve     Medication Therapy Plan:         Comments:     Note composed:11:33 AM 07/08/2024             Appointments     Last Visit   5/17/2024 Nuzhat Kline MD   Next Visit   Visit date not found Nuzhat Kline MD

## 2024-07-12 ENCOUNTER — HOSPITAL ENCOUNTER (OUTPATIENT)
Dept: RADIOLOGY | Facility: HOSPITAL | Age: 79
Discharge: HOME | End: 2024-07-12
Attending: FAMILY MEDICINE
Payer: MEDICARE

## 2024-07-12 DIAGNOSIS — Z86.73 HISTORY OF LACUNAR CEREBROVASCULAR ACCIDENT (CVA): ICD-10-CM

## 2024-07-12 DIAGNOSIS — R90.89 ABNORMAL BRAIN CT: ICD-10-CM

## 2024-07-12 DIAGNOSIS — F03.90 DEMENTIA, UNSPECIFIED DEMENTIA SEVERITY, UNSPECIFIED DEMENTIA TYPE, UNSPECIFIED WHETHER BEHAVIORAL, PSYCHOTIC, OR MOOD DISTURBANCE OR ANXIETY: ICD-10-CM

## 2024-07-12 PROCEDURE — 70551 MRI BRAIN STEM W/O DYE: CPT | Mod: 26,,, | Performed by: RADIOLOGY

## 2024-07-12 PROCEDURE — 70551 MRI BRAIN STEM W/O DYE: CPT | Mod: TC,PO

## 2024-07-17 ENCOUNTER — TELEPHONE (OUTPATIENT)
Dept: NEUROLOGY | Facility: CLINIC | Age: 79
End: 2024-07-17
Payer: MEDICARE

## 2024-07-17 NOTE — TELEPHONE ENCOUNTER
Returned call to patients daughter. No answer. Left message to inform Kim does not have any sooner appointments but placed on wait list in case anything opens up.

## 2024-07-17 NOTE — TELEPHONE ENCOUNTER
----- Message from Beatriz Olson Patient Care Assistant sent at 7/16/2024  3:59 PM CDT -----  Contact: Pt Juantia Anthony  Type:  Sooner Appointment Request  Caller is requesting a sooner appointment.  Caller declined first available appointment listed below.  Caller will not accept being placed on the waitlist and is requesting a message be sent to doctor.  Name of Caller:  Pt Juanita Anthony  When is the first available appointment?  08/19/24  Symptoms:  HFU/Stroke  Best Call Back Number:  [phone]  Additional Information:  Please advise

## 2024-07-19 RX ORDER — METOPROLOL TARTRATE 25 MG/1
25 TABLET, FILM COATED ORAL 2 TIMES DAILY
Qty: 180 TABLET | Refills: 3 | Status: SHIPPED | OUTPATIENT
Start: 2024-07-19

## 2024-07-19 NOTE — TELEPHONE ENCOUNTER
No care due was identified.  [facility] Embedded Care Due Messages. Reference number: [id number].   7/19/2024 2:24:55 PM CDT

## 2024-07-19 NOTE — TELEPHONE ENCOUNTER
Refill Decision Note   Luda Fran  is requesting a refill authorization.  Brief Assessment and Rationale for Refill:  Approve     Medication Therapy Plan:         Comments:     Note composed:4:27 PM 07/19/2024

## 2024-08-13 ENCOUNTER — TELEPHONE (OUTPATIENT)
Dept: FAMILY MEDICINE | Facility: CLINIC | Age: 79
End: 2024-08-13

## 2024-08-13 NOTE — TELEPHONE ENCOUNTER
----- Message from Anna Roy sent at 8/13/2024  7:54 AM CDT -----  Contact: Gabrielle Grant  Type:  Needs Medical Advice    Who Called:   Gabrielle Grant    Would the patient rather a call back or a response via [facility]?   Call back  Best Call Back Number:    [phone] - Gabrielle    Additional Information:   States she is requesting a request for a new nebulizer be sent to [facility] - states she realizes it will need a prior authorization - please call - thank you

## 2024-08-19 ENCOUNTER — OFFICE VISIT (OUTPATIENT)
Dept: NEUROLOGY | Facility: CLINIC | Age: 79
End: 2024-08-19
Payer: MEDICARE

## 2024-08-19 VITALS
WEIGHT: 148.38 LBS | DIASTOLIC BLOOD PRESSURE: 72 MMHG | HEIGHT: 62 IN | RESPIRATION RATE: 16 BRPM | SYSTOLIC BLOOD PRESSURE: 109 MMHG | BODY MASS INDEX: 27.3 KG/M2

## 2024-08-19 DIAGNOSIS — F03.90 DEMENTIA, UNSPECIFIED DEMENTIA SEVERITY, UNSPECIFIED DEMENTIA TYPE, UNSPECIFIED WHETHER BEHAVIORAL, PSYCHOTIC, OR MOOD DISTURBANCE OR ANXIETY: ICD-10-CM

## 2024-08-19 DIAGNOSIS — R90.89 ABNORMAL BRAIN CT: ICD-10-CM

## 2024-08-19 DIAGNOSIS — R41.3 MEMORY LOSS: ICD-10-CM

## 2024-08-19 DIAGNOSIS — Z86.73 HISTORY OF LACUNAR CEREBROVASCULAR ACCIDENT (CVA): Primary | ICD-10-CM

## 2024-08-19 DIAGNOSIS — R93.0 ABNORMAL MRI OF HEAD: ICD-10-CM

## 2024-08-19 PROCEDURE — 1125F AMNT PAIN NOTED PAIN PRSNT: CPT | Mod: CPTII,S$GLB,, | Performed by: NURSE PRACTITIONER

## 2024-08-19 PROCEDURE — 1160F RVW MEDS BY RX/DR IN RCRD: CPT | Mod: CPTII,S$GLB,, | Performed by: NURSE PRACTITIONER

## 2024-08-19 PROCEDURE — 99999 PR PBB SHADOW E&M-EST. PATIENT-LVL V: CPT | Mod: PBBFAC,,, | Performed by: NURSE PRACTITIONER

## 2024-08-19 PROCEDURE — 3078F DIAST BP <80 MM HG: CPT | Mod: CPTII,S$GLB,, | Performed by: NURSE PRACTITIONER

## 2024-08-19 PROCEDURE — 99205 OFFICE O/P NEW HI 60 MIN: CPT | Mod: S$GLB,,, | Performed by: NURSE PRACTITIONER

## 2024-08-19 PROCEDURE — 3074F SYST BP LT 130 MM HG: CPT | Mod: CPTII,S$GLB,, | Performed by: NURSE PRACTITIONER

## 2024-08-19 PROCEDURE — 1159F MED LIST DOCD IN RCRD: CPT | Mod: CPTII,S$GLB,, | Performed by: NURSE PRACTITIONER

## 2024-08-19 PROCEDURE — 1101F PT FALLS ASSESS-DOCD LE1/YR: CPT | Mod: CPTII,S$GLB,, | Performed by: NURSE PRACTITIONER

## 2024-08-19 PROCEDURE — 3288F FALL RISK ASSESSMENT DOCD: CPT | Mod: CPTII,S$GLB,, | Performed by: NURSE PRACTITIONER

## 2024-08-19 RX ORDER — ASPIRIN 81 MG/1
81 TABLET ORAL DAILY
COMMUNITY

## 2024-08-19 NOTE — ASSESSMENT & PLAN NOTE
Reports of increased confusion, misplacing items and memory loss by family.  There are no reports of behavioral changes.  Mood is better controlled with Abilify and Lexapro. Visual hallucinations are questionable as pt denies this but rather thinks her dead relatives are there in spirit  MMSE~14/16    - cannot fully rule out a neurocognitive disorder today.    - other medical issues like COPD, untreated sleep apnea, med S/E's and mood can also play a role  MRI brain scan noted several small lacunes and moderate white matter changes  Serologies noted  Pt is not interested in alternative options for CPAP; she does use supplemental O2 nightly.   Would recommend monitoring cognition overtime via clinic testing or Neuro psych testing  Pt to also f/u with PCP/Endocrine for recent abnormal findings    - serologies may be warranted

## 2024-08-19 NOTE — ASSESSMENT & PLAN NOTE
Noted with abnormal appearance of pituitary gland  Dedicated pituitary MRI is recommended but pt had trouble with tolerating MRI in past  May be worth while getting input from Endocrine about repeat imaging vs serologies

## 2024-08-19 NOTE — ASSESSMENT & PLAN NOTE
Recent CTH and MRI brain scan noted with several lacunes within bilateral BG and right thalamus.   Stable when compared to previous CTH done in Oct 2023.  Cont ASA 81 mg QD for secondary stroke prevention  Work with PCP on controlling vascular risk factors   - BP management   - LDL 97; goal LDL is < 70   - A1c 5.6   - diet modification

## 2024-08-19 NOTE — PROGRESS NOTES
NEUROLOGY  Outpatient Consultation Visit     [facility]  [address] [facility] [address], [place] [address]  [phone] (office) / [phone] (fax)    Patient Name:  Luda Peters  :  1945  MR #:  [id number]  Acct #:  [id number]    Date of Neurology Consult: 2024  Name of Provider: RIAN Williamson    Other Physicians:  Nuzhat Kline MD (Primary Care Physician); Other (Referring)      Chief Complaint: Results      History of Present Illness (HPI):  Luda Peters is a right handed 79 y.o. female  with a PMHX of back pain, COPD, Anxiety, emphysema, HTN, gastric ulcer      Patient presents today to discuss MRI brain scan results.   Family states last October her PCP ordered a CT scan for reported left face and neck pain. CTH at that time did show old infarcts.     Patient was admitted in July for COPD and hypertension. Family requested she have updated brain imaging while inpatient for ongoing confusion and gait disturbance. PCP did order an updated CT scan in march which appeared to show stable/chronic infarcts. A MRI brain scan was then ordered. Again, white matter changes as well as remote infarcts were noted.     Daughter states she continues to have confusion. She will forget if she took medications. Daughter does help manage this. She will also misplace items. Daughter does manage her finances. Daughter states patient has mentioned seeing dead relatives. Patient states she does not see the, This has been going on for the last 6-12 mos. She is no longer driving. Patient is independent with ADLs.  She likes puzzles, reading and doing word searches.           Past Medical, Surgical, Family & Social History:   Past Medical History:   Diagnosis Date    Anticoagulant long-term use     Anxiety 2016    Back pain     Closed fracture of second lumbar vertebra with routine healing 2015    COPD (chronic obstructive pulmonary disease) 2016    Emphysema of lung      Encounter for blood transfusion     Gastric ulcer     Hypertension      Past Surgical History:   Procedure Laterality Date    EPIDURAL STEROID INJECTION INTO LUMBAR SPINE Right 7/7/2023    Procedure: Injection-steroid-epidural-lumbar L5/S1;  Surgeon: Casey Mccall MD;  Location: [facility] OR;  Service: Pain Management;  Laterality: Right;    ESOPHAGOGASTRODUODENOSCOPY N/A 3/22/2019    Procedure: EGD (ESOPHAGOGASTRODUODENOSCOPY);  Surgeon: Nomi Fajardo Jr., MD;  Location: [facility];  Service: Endoscopy;  Laterality: N/A;    HYSTERECTOMY      TRANSFORAMINAL EPIDURAL INJECTION OF STEROID Right 12/30/2021    Procedure: Injection,steroid,epidural,transforaminal approach L4/5;  Surgeon: Casey Mccall MD;  Location: [facility] OR;  Service: Pain Management;  Laterality: Right;     Family History   Problem Relation Name Age of Onset    No Known Problems Son       Alcohol use:  reports current alcohol use.   (Of note, 0.6 oz = 1 beer or 6 oz = 10 beers).  Tobacco use:  reports that she quit smoking about 9 years ago. Her smoking use included cigarettes. She has never used smokeless tobacco.  Street drug use:  reports no history of drug use.  Allergies: Iodine and iodide containing products.    Home Medications:     Current Outpatient Medications:     acetaminophen (TYLENOL) 500 MG tablet, Take 500 mg by mouth Daily., Disp: , Rfl:     acetaminophen (TYLENOL) 650 MG TbSR, Take 1,300 mg by mouth 2 (two) times daily., Disp: , Rfl:     albuterol (PROVENTIL/VENTOLIN HFA) 90 mcg/actuation inhaler, Inhale 2 puffs into the lungs every 6 (six) hours as needed for Wheezing or Shortness of Breath. Rescue, Disp: 3 g, Rfl: 6    albuterol-ipratropium (DUO-NEB) 2.5 mg-0.5 mg/3 mL nebulizer solution, Take 3 mLs by nebulization every 6 (six) hours as needed for Wheezing. Rescue, Disp: 75 mL, Rfl: 5    allopurinoL (ZYLOPRIM) 100 MG tablet, TAKE 1 TABLET(100 MG) BY MOUTH EVERY DAY, Disp: 90 tablet, Rfl: 1    ARIPiprazole (ABILIFY) 2 MG  Tab, Take 1 tablet (2 mg total) by mouth once daily., Disp: 90 tablet, Rfl: 3    ascorbic acid, vitamin C, (VITAMIN C) 1000 MG tablet, Take 1,000 mg by mouth daily as needed (immune support)., Disp: , Rfl:     atorvastatin (LIPITOR) 20 MG tablet, Take 1 tablet (20 mg total) by mouth once daily., Disp: 90 tablet, Rfl: 3    budesonide (PULMICORT) 0.5 mg/2 mL nebulizer solution, Take 2 mLs (0.5 mg total) by nebulization 2 (two) times daily as needed (wheezing). USE 1 VIAL VIA NEBULIZER EVERY 12 HOURS, Disp: 100 mL, Rfl: 2    clopidogreL (PLAVIX) 75 mg tablet, TAKE 1 TABLET(75 MG) BY MOUTH EVERY DAY, Disp: 90 tablet, Rfl: 3    diclofenac sodium (VOLTAREN) 1 % Gel, APPLY 2 GRAMS TOPICALLY TO THE AFFECTED AREA TWICE DAILY, Disp: 100 g, Rfl: 1    docusate sodium (COLACE) 100 MG capsule, Take 100 mg by mouth 2 (two) times daily., Disp: , Rfl:     EScitalopram oxalate (LEXAPRO) 20 MG tablet, TAKE 1 TABLET(20 MG) BY MOUTH EVERY EVENING, Disp: 90 tablet, Rfl: 3    fluticasone-umeclidin-vilanter (TRELEGY ELLIPTA) 100-62.5-25 mcg DsDv, Inhale 1 puff into the lungs once daily., Disp: 180 each, Rfl: 3    furosemide (LASIX) 40 MG tablet, TAKE 1 TABLET BY MOUTH TWICE DAILY (Patient taking differently: Take 40 mg by mouth 2 (two) times a day.), Disp: 180 tablet, Rfl: 3    gabapentin (NEURONTIN) 600 MG tablet, TAKE 1 TABLET(600 MG) BY MOUTH TWICE DAILY, Disp: 180 tablet, Rfl: 1    metoprolol tartrate (LOPRESSOR) 25 MG tablet, Take 1 tablet (25 mg total) by mouth 2 (two) times daily., Disp: 180 tablet, Rfl: 3    multivitamin (THERAGRAN) per tablet, Take 1 tablet by mouth daily as needed (immune support)., Disp: , Rfl:     naproxen sodium (ANAPROX) 220 MG tablet, Take 220 mg by mouth Daily., Disp: , Rfl:     nortriptyline (PAMELOR) 25 MG capsule, TAKE 1 CAPSULE(25 MG) BY MOUTH EVERY EVENING, Disp: 90 capsule, Rfl: 3    omega-3 fatty acids/fish oil (FISH OIL-OMEGA-3 FATTY ACIDS) 300-1,000 mg capsule, Take 1 capsule by mouth once daily.,  "Disp: , Rfl:     pantoprazole (PROTONIX) 40 MG tablet, TAKE 1 TABLET BY MOUTH TWICE DAILY, Disp: 180 tablet, Rfl: 1    zinc acetate 50 mg (zinc) Cap, Take 1 tablet by mouth daily as needed (immune support)., Disp: , Rfl:     aspirin (ECOTRIN) 81 MG EC tablet, Take 81 mg by mouth once daily., Disp: , Rfl:     Physical Examination:  /72 (BP Location: Left arm, Patient Position: Sitting, BP Method: Large (Automatic))   Resp 16   Ht 5' 2" (1.575 m)   Wt 67.3 kg (148 lb 5.9 oz)   BMI 27.14 kg/m²     GENERAL:  General appearance: Well, non-toxic appearing.  No apparent distress.  Neck: supple.    MENTAL STATUS:  Alertness, attention span & concentration: normal.  Language: normal.  Orientation to self, place & time:  normal.  Memory, recent & remote: normal.  Fund of knowledge: normal.  MMSE~2: 14/16          SPEECH:  Clear and fluent.  Follows complex commands.      CRANIAL NERVES:  Cranial Nerves II-XII were examined.  II - Visual fields: normal.  III, IV, VI: PERRL, EOMI, No ptosis, No nystagmus.  V - Facial sensation: normal.  VII - Face symmetry & mobility: normal.  VIII - Hearing: normal  IX, X - Palate: mobile & midline.  XI - Shoulder shrug: normal.  XII - Tongue protrusion: normal.        GROSS MOTOR:  Gait & station: did not assess today; in   Tone: normal.  Abnormal movements: none.  Finger-nose: normal.  Rapid alternating movements: normal.  Pronator drift: normal      MUSCLE STRENGTH:   Hand grasp:   - right:5/5   - left:5/5    RIGHT    LEFT   5 Deltoids 4+   5 Biceps 5   5 Triceps 5   5 Forearm.Pr. 5        5 Iliopsoas flex    5   4+ Hip Abduct 4+   4+ Hip Adduct 4+   5 Quads 5   5 Hams 5   5 Dorsiflex 5   5 Plantar Flex 5     REFLEXES:    RIGHT Reflex   LEFT   2+ Biceps 2+   2+ Brachiorad. 2+        2+ Patellar 2+     SENSORY:  Light touch: Normal throughout.           Diagnostic Data Reviewed:   I have personally reviewed provider notes, labs and imaging made available to me today. "     Imaging:    MRI Brain Without Contrast 7/2024    Narrative  EXAMINATION:  MRI BRAIN WITHOUT CONTRAST    CLINICAL HISTORY:  Mental status change, unknown cause; Other abnormal findings on diagnostic imaging of central nervous system    TECHNIQUE:  Multisequence, multiplanar imaging through the brain performed without IV contrast.    COMPARISON:  03/22/2024 CT head    FINDINGS:  Diffusion-weighted imaging is negative for acute ischemia or focal lesion.  Gradient echo images demonstrate basal ganglia linear susceptibility artifact likely related to remote microhemorrhage.  Additional small punctate foci of susceptibility artifact involving parietal and occipital lobes likely on the basis of remote blood products.    No intracranial mass, midline shift, or mass effect.  Ventricles and sulci are normal in size for age.  Moderate periventricular and deep white matter T2/FLAIR hyperintensity consistent with microangiopathic change.  Cerebellar hemispheres and brainstem are unremarkable.  Major intracranial T2 flow voids are patent.    Mastoid air cells are clear.  Left maxillary sinus fluid layering dependently.    No bone marrow signal abnormality.  Abnormal appearance of the pituitary gland, with greater than expected soft tissue signal intensity along the rightward and posterior aspect of the sella.  This correlates to findings on previous CT scans.    Impression  Negative for acute ischemia or acute intracranial pathology.    Abnormal appearance of the pituitary gland/sella, for which dedicated pituitary protocol MRI follow-up is recommended.    Moderate white matter microangiopathic changes, and evidence of remote basal ganglia infarctions.      CT Head Without Contrast 3/2024    Narrative  EXAMINATION:  CT HEAD WITHOUT CONTRAST    CLINICAL HISTORY:  Stroke, follow up; Personal history of transient ischemic attack (TIA), and cerebral infarction without residual deficits    TECHNIQUE:  Low dose axial CT images  obtained throughout the head without intravenous contrast. Sagittal and coronal reconstructions were performed.    COMPARISON:  Head CT 10/13/2023.    FINDINGS:  Brain: Redemonstrated chronic lacunar type infarcts within the bilateral basal ganglia and right thalamus without CT evidence of an acute major vascular territory infarct, intracranial hemorrhage, or space-occupying extra-axial fluid collections.  Superimposed scattered low densities throughout the cerebral hemispheric white matter consistent with age related chronic small vessel ischemic changes similar to the prior exam.  No mass effect.    There is nonspecific soft tissue fullness within the sella somewhat better characterized on the current study roughly measuring 1.4 x 1.4 x 1.1 cm in maximal AP by TV by CC dimensions with potential involvement of the right cavernous sinus.    Ventricles: The ventricles, sulci, and cisterns are within normal limits.    Skull: The osseous structures are unremarkable in appearance.    Extracranial soft tissues: Limited imaging is within normal limits.    Other: No acute orbital process identified.  Trace fluid within the left mastoid air cells.  Right mastoid is clear.  Mild scattered mucosal thickening within the paranasal sinuses and trace fluid level within the left maxillary sinus unchanged.    Impression  1. Chronic small vessel ischemic changes including several lacunar-type infarcts similar to the prior examination without evidence of an acute process.  2. Soft tissue fullness within the sella is nonspecific and could represent an underlying adenoma or small meningioma.  Correlation with biochemical markers and potential routine MRI pituitary follow-up is recommended if clinically indicated/feasible.        Cardiac:    Labs:  Lab Results   Component Value Date    WBC 14.97 (H) 03/12/2024    HGB 16.2 (H) 03/12/2024    HCT 54.6 (H) 03/12/2024     03/12/2024     (H) 03/12/2024    RDW 15.3 (H) 03/12/2024  "    Lab Results   Component Value Date     03/12/2024    K 3.9 03/12/2024    CL 91 (L) 03/12/2024    CO2 37 (H) 03/12/2024    BUN 38 (H) 03/12/2024    CREATININE 1.0 03/12/2024     (H) 03/12/2024    CALCIUM 9.9 03/12/2024    MG 2.1 07/27/2020     Lab Results   Component Value Date    PROT 7.5 03/12/2024    ALBUMIN 3.4 (L) 03/12/2024    BILITOT 0.4 03/12/2024    AST 30 03/12/2024    ALKPHOS 78 03/12/2024    ALT 39 03/12/2024     No results found for: "INR", "PROTIME", "PTT"  Lab Results   Component Value Date    CHOL 174 05/05/2023    HDL 47 05/05/2023    LDLCALC 97.8 05/05/2023    TRIG 146 05/05/2023    CHOLHDL 27.0 05/05/2023     Lab Results   Component Value Date    HGBA1C 5.6 10/09/2023      Lab Results   Component Value Date    JOTFMVNH81 393 05/05/2023     Lab Results   Component Value Date    FOLATE 10.3 05/05/2023     Lab Results   Component Value Date    TSH 2.893 05/05/2023     No results found for: "VITAMINB1"  No results found for: "RPR"  No results found for: "ANJANA"  No components found for: "HEPATITISCANTIBODY"  No components found for: "HIV 1/2 AG/AB"  No results found for: "CRP"  No components found for: "SEDIMENTATIONRATE"    HST 2022:  MELISSA 24.6%      Assessment and Plan:  Luda Peters is a 79 y.o. female.      Problem List Items Addressed This Visit          Neuro    Memory loss    Current Assessment & Plan     Reports of increased confusion, misplacing items and memory loss by family.  There are no reports of behavioral changes.  Mood is better controlled with Abilify and Lexapro. Visual hallucinations are questionable as pt denies this but rather thinks her dead relatives are there in spirit  MMSE~14/16    - cannot fully rule out a neurocognitive disorder today.    - other medical issues like COPD, untreated sleep apnea, med S/E's and mood can also play a role  MRI brain scan noted several small lacunes and moderate white matter changes  Serologies noted  Pt is not interested " in alternative options for CPAP; she does use supplemental O2 nightly.   Would recommend monitoring cognition overtime via clinic testing or Neuro psych testing  Pt to also f/u with PCP/Endocrine for recent abnormal findings    - serologies may be warranted            History of lacunar cerebrovascular accident (CVA) - Primary    Current Assessment & Plan     Recent CTH and MRI brain scan noted with several lacunes within bilateral BG and right thalamus.   Stable when compared to previous CTH done in Oct 2023.  Cont ASA 81 mg QD for secondary stroke prevention  Work with PCP on controlling vascular risk factors   - BP management   - LDL 97; goal LDL is < 70   - A1c 5.6   - diet modification             Other    Abnormal MRI of head    Current Assessment & Plan     Noted with abnormal appearance of pituitary gland  Dedicated pituitary MRI is recommended but pt had trouble with tolerating MRI in past  May be worth while getting input from Endocrine about repeat imaging vs serologies                   Important to note, also  has a past medical history of Anticoagulant long-term use, Anxiety (9/26/2016), Back pain, Closed fracture of second lumbar vertebra with routine healing (12/7/2015), COPD (chronic obstructive pulmonary disease) (9/26/2016), Emphysema of lung, Encounter for blood transfusion, Gastric ulcer, and Hypertension.            The patient will return to clinic in 6 months.        All questions were answered and patient is comfortable with the plan.       Thank you very much for the opportunity to assist in this patient's care.    If you have any questions or concerns, please do not hesitate to contact me at any time.    Sincerely,     RIAN Williamson  [facility] [place]         I spent a total of 65 minutes on the day of the visit.This includes face to face time and non-face to face time preparing to see the patient (eg, review of tests), Obtaining and/or reviewing  separately obtained history, Documenting clinical information in the electronic or other health record, Independently interpreting resultsand communicating results to the patient/family/caregiver, or Care coordination.

## 2024-08-23 ENCOUNTER — TELEPHONE (OUTPATIENT)
Dept: FAMILY MEDICINE | Facility: CLINIC | Age: 79
End: 2024-08-23
Payer: MEDICARE

## 2024-08-23 NOTE — TELEPHONE ENCOUNTER
----- Message from Bree Sandoval sent at 8/23/2024  3:09 PM CDT -----  Type:  Patient Returning Call    Who Called:  pt  Who Left Message for Patient:  ashwin  Does the patient know what this is regarding?:  no  Best Call Back Number:  [phone]  Additional Information:  Please call back to advise. Thanks.

## 2024-08-23 NOTE — TELEPHONE ENCOUNTER
----- Message from Juan Ramon Johnson sent at 8/23/2024  1:39 PM CDT -----  Type: Needs Medical Advice    Who Called:  Pt's daughter    Best Call Back Number: [phone]    Additional Information: Pt's daughter wanted to let elder know that the neuro  dr suggested blood work for thyroid and wanted to see if dr wanted to order blood work before next chris on 10/4.Please call back to advise, Thanks!

## 2024-08-28 DIAGNOSIS — M25.561 ACUTE PAIN OF RIGHT KNEE: ICD-10-CM

## 2024-08-28 NOTE — TELEPHONE ENCOUNTER
Care Due:                  Date            Visit Type   Department     Provider  --------------------------------------------------------------------------------                                EP -                              PRIMARY      [facility] FAMILY  Last Visit: 05-      CARE ([facility])   STACI Kline                               -                              [facility]  Next Visit: 10-      CARE ([facility])   STACI Kline                                                            Last  Test          Frequency    Reason                     Performed    Due Date  --------------------------------------------------------------------------------    Lipid Panel.  12 months..  atorvastatin.............  05- 04-    Uric Acid...  12 months..  allopurinoL..............  Not Found    Overdue    [employer] Embedded Care Due Messages. Reference number: [id number].   8/28/2024 4:40:55 PM CDT

## 2024-08-29 NOTE — TELEPHONE ENCOUNTER
Refill Routing Note   Medication(s) are not appropriate for processing by [facility] for the following reason(s):        Outside of protocol    [facility] action(s):  Route   Requires labs : Yes      Medication Therapy Plan: FOVS      Appointments  past 12m or future 3m with PCP    Date Provider   Last Visit   5/17/2024 Nuzhat Kline MD   Next Visit   10/4/2024 Nuzhat Kline MD   ED visits in past 90 days: 0        Note composed:11:41 PM 08/28/2024

## 2024-08-30 RX ORDER — DICLOFENAC SODIUM 10 MG/G
GEL TOPICAL
Qty: 100 G | Refills: 1 | Status: SHIPPED | OUTPATIENT
Start: 2024-08-30

## 2024-10-04 ENCOUNTER — OFFICE VISIT (OUTPATIENT)
Dept: FAMILY MEDICINE | Facility: CLINIC | Age: 79
End: 2024-10-04
Payer: MEDICARE

## 2024-10-04 VITALS
WEIGHT: 164.25 LBS | DIASTOLIC BLOOD PRESSURE: 78 MMHG | HEIGHT: 62 IN | BODY MASS INDEX: 30.22 KG/M2 | HEART RATE: 76 BPM | SYSTOLIC BLOOD PRESSURE: 114 MMHG

## 2024-10-04 DIAGNOSIS — E23.7 ABNORMALITY OF PITUITARY GLAND: ICD-10-CM

## 2024-10-04 DIAGNOSIS — J43.2 CENTRILOBULAR EMPHYSEMA: ICD-10-CM

## 2024-10-04 DIAGNOSIS — N18.31 CHRONIC KIDNEY DISEASE, STAGE 3A: Primary | ICD-10-CM

## 2024-10-04 DIAGNOSIS — R73.03 PREDIABETES: ICD-10-CM

## 2024-10-04 DIAGNOSIS — Z79.899 HIGH RISK MEDICATION USE: ICD-10-CM

## 2024-10-04 DIAGNOSIS — R35.0 URINARY FREQUENCY: ICD-10-CM

## 2024-10-04 PROBLEM — S82.209A TIBIA/FIBULA FRACTURE: Status: RESOLVED | Noted: 2022-01-24 | Resolved: 2024-10-04

## 2024-10-04 PROBLEM — L89.612 PRESSURE INJURY OF RIGHT HEEL, STAGE 2: Status: RESOLVED | Noted: 2024-03-12 | Resolved: 2024-10-04

## 2024-10-04 PROBLEM — S82.409A TIBIA/FIBULA FRACTURE: Status: RESOLVED | Noted: 2022-01-24 | Resolved: 2024-10-04

## 2024-10-04 PROBLEM — U07.1 COVID: Status: RESOLVED | Noted: 2022-07-07 | Resolved: 2024-10-04

## 2024-10-04 PROCEDURE — 99999 PR PBB SHADOW E&M-EST. PATIENT-LVL V: CPT | Mod: PBBFAC,,, | Performed by: FAMILY MEDICINE

## 2024-10-04 RX ORDER — GABAPENTIN 300 MG/1
300 CAPSULE ORAL 3 TIMES DAILY
Qty: 90 CAPSULE | Refills: 11 | Status: SHIPPED | OUTPATIENT
Start: 2024-10-04 | End: 2025-10-04

## 2024-10-04 NOTE — PROGRESS NOTES
Patient ID:   Luda Peters is a 79 y.o. female.    Chief Complaint: Follow-up      History of Present Illness    CHIEF COMPLAINT:  Luda presents today for follow up.    WEIGHT MANAGEMENT:  She reports recent weight gain, currently weighing 164 lbs, up from 148 lbs in August. She attributes this to occasional indulgences during social outings and vacations. She expresses commitment to maintaining a balanced diet at home to allow for occasional treats while dining out. She is generally strict with her diet and often brings home half of her meal when eating out.    RESPIRATORY HEALTH:  She reports a mild cough with some sputum production and has medications at home to manage this if it worsens. Regarding CPAP usage, she admits to inconsistent use, often removing it after only a few hours of sleep or skipping nights entirely. She expresses skepticism about its benefits, noting she feels she sleeps better without it. She acknowledges using it for approximately 4 hours some nights, followed by 2-3 days of non-use. Her daughter reports difficulty in ensuring consistent CPAP use, especially during warmer weather when breathing is more labored.    MEMORY:  She reports previous memory test results, scoring 14 out of 16 points. She expresses difficulty recalling seasons during the test, stating she was unable to identify the current season when asked. Despite this specific challenge, the overall memory score is noted as very good.    LEG PAIN AND MEDICATIONS:  She experiences leg pain that varies in intensity throughout the day, most severe in the morning, around 2:30-3:00 PM, and again around 4:30-5:00 PM. She notes some relief when elevating her feet. Currently, she takes gabapentin 300mg twice daily, breaking 600mg tablets in half. She also takes nortriptyline 25mg for restless leg syndrome. For arthritis pain, she takes 500mg in the middle of the day and 650mg in the morning and evening, sometimes spacing out the  evening dose. She expresses desire for stronger pain medication for her legs. She denies significant side effects from the current regimen, except for previous episodes of arm or leg spasms with increased gabapentin dosage.    URINARY ISSUES:  She reports frequent urination, occurring approximately every 5 minutes throughout the day, with up to 15 episodes daily. She experiences urinary incontinence, often leaking before reaching the bathroom despite the short distance from her room. She denies waiting too long to urinate, stating she responds immediately to the urge. She expresses confusion and concern about these urinary symptoms, noting that previous urine tests have been clear.    SKIN CONCERNS:  She reports a recent bleeding incident on her leg while at her brother's house. She scratched an area on her leg, which began to bleed and had difficulty stopping. The bleeding eventually stopped after applying a Band-Aid. She expresses concern about the prolonged bleeding time. She mentions experiencing bruising on her arms.    EXERCISE AND MOBILITY:  She reports using store carts for mobility assistance while shopping, especially on days when symptoms are mo  re severe. She acknowledges feeling significant pain and fatigue during shopping trips, particularly in larger stores. She understands the importance of maintaining physical activity and agrees to attempt walking more when possible on days with fewer symptoms.    DIET AND SOCIAL HISTORY:  She reports efforts to maintain a balanced diet, acknowledging occasional indulgences. She describes recent dietary changes during a two-week stay at her son's house, where she consumed more food than usual, predominantly Polish cuisine. She enjoys smoothies made with a  received as a birthday gift. She recognizes the importance of moderation, allowing for occasional treats while generally adhering to dietary restrictions to manage her health conditions.    MEDICAL  HISTORY:  She reports a previous hospitalization where she was discharged on a lower dose of medication than she is currently taking. A slight change in her pituitary gland was noted on imaging, described as incidental and not of immediate concern. She denies experiencing any symptoms related to pituitary gland changes.    MEDICATIONS:  In addition to the pain medications mentioned earlier, she continues to take aspirin as prescribed and an unspecified blood pressure medication. The gabapentin dosage will be adjusted to 300 mg 3 times daily to better manage pain throughout the day.    DIAGNOSTIC TESTS:  She reports previous MRI results showed some small areas, which she was informed is fine. The MRI revealed stable head imaging compared to last year. She is scheduled for upcoming lab work, including routine labs and specific labs to check pituitary function, to be completed before the end of the year.    ALLERGIES:  She denies any allergies.    SUBSTANCE USE:  She reports consuming one cup of coffee daily in the morning. She denies drinking soft drinks or other caffeinated beverages throughout the day, stating she primarily drinks water.    SURGICAL HISTORY:  She denies any surgical history.    ROS:  General: -fever, -chills, +fatigue, +weight gain, -weight loss, -change in weight  Eyes: -vision changes, -redness, -discharge  ENT: -ear pain, -nasal congestion, -sore throat  Cardiovascular: -chest pain, -palpitations, -lower extremity edema  Respiratory: +cough, -shortness of breath, +difficulty breathing  Gastrointestinal: -abdominal pain, -nausea, -vomiting, -diarrhea, -constipation, -blood in stool  Genitourinary: -dysuria, -hematuria, +frequency, +urgency  Musculoskeletal: -joint pain, +muscle pain, -muscle spasms  Skin: -rash, -lesion  Neurological: -headache, -dizziness, -numbness, -tingling  Psychiatric: -anxiety, -depression, -sleep difficulty  Allergic: -allergic reactions         The 10-year ASCVD risk  score (Leny ALY, et al., 2019) is: 19.4%    Values used to calculate the score:      Age: 79 years      Sex: Female      Is Non- : No      Diabetic: No      Tobacco smoker: No      Systolic Blood Pressure: 114 mmHg      Is BP treated: No      HDL Cholesterol: 47 mg/dL      Total Cholesterol: 174 mg/dL    Exam:  Physical Exam  Vitals and nursing note reviewed.   Constitutional:       General: She is not in acute distress.     Appearance: Normal appearance. She is well-developed.   HENT:      Head: Normocephalic and atraumatic.   Eyes:      General: No scleral icterus.        Right eye: No discharge.         Left eye: No discharge.      Conjunctiva/sclera: Conjunctivae normal.   Cardiovascular:      Rate and Rhythm: Normal rate and regular rhythm.   Pulmonary:      Effort: Pulmonary effort is normal. No respiratory distress.      Breath sounds: Decreased air movement present. Decreased breath sounds present.   Abdominal:      General: Bowel sounds are normal. There is no distension.      Palpations: Abdomen is soft.   Musculoskeletal:         General: Tenderness present.      Cervical back: Neck supple. No rigidity.      Right lower leg: No edema.      Left lower leg: No edema.   Skin:     General: Skin is warm and dry.   Neurological:      General: No focal deficit present.      Mental Status: She is alert and oriented to person, place, and time.   Psychiatric:         Mood and Affect: Mood normal.         Behavior: Behavior normal.         Assessment/Plan:  Chronic kidney disease, stage 3a    Centrilobular emphysema  -     NEBULIZER FOR HOME USE    Abnormality of pituitary gland  -     ACTH; Future; Expected date: 10/04/2024  -     Cortisol, 8AM; Future; Expected date: 10/04/2024  -     Luteinizing hormone; Future; Expected date: 10/04/2024  -     Follicle stimulating hormone; Future; Expected date: 10/04/2024  -     Prolactin; Future; Expected date: 10/04/2024  -     Insulin-like growth  factor; Future; Expected date: 10/04/2024  -     TSH; Future; Expected date: 10/04/2024  -     T4, free; Future; Expected date: 10/04/2024  -     Comprehensive metabolic panel; Future; Expected date: 10/04/2024  -     CBC Auto Differential; Future; Expected date: 10/04/2024  -     Hemoglobin A1C; Future; Expected date: 10/04/2024  -     Urinalysis, Reflex to Urine Culture Urine, Clean Catch    Prediabetes  -     Hemoglobin A1C; Future; Expected date: 10/04/2024    Urinary frequency    High risk medication use  -     TSH; Future; Expected date: 10/04/2024  -     T4, free; Future; Expected date: 10/04/2024    Other orders  -     gabapentin (NEURONTIN) 300 MG capsule; Take 1 capsule (300 mg total) by mouth 3 (three) times daily.  Dispense: 90 capsule; Refill: 11         Assessment & Plan    Assessed patient's weight, noting slight increase from August but stable from earlier in the year  Evaluated lung function in context of changing weather, finding no significant issues  Reviewed memory test results from August, showing score of 14/16 indicating no major memory concerns  Considered pituitary gland changes as incidental finding, not requiring immediate intervention  Assessed leg pain, determining gabapentin dose adjustment may provide relief  Evaluated urinary frequency complaints, likely age-related issue without signs of infection  Considered skin bleeding episode, attributed to anticoagulant use    SLEEP APNEA:  - Explained importance of CPAP use for proper rest and health maintenance, even if only used for part of the night.  - Luda to continue using CPAP machine, aiming for at least 4 hours per night.    URINARY FREQUENCY:  - Discussed causes of frequent urination, including age-related changes and effects of certain beverages.    ANTICOAGULATION MANAGEMENT:  - Provided information on managing prolonged bleeding while on anticoagulants, including pressure application and elevation.  - Continued aspirin.    GENERAL  HEALTH MAINTENANCE:  - Luda to maintain current diet and exercise regimen to support health improvements.  - Recommend pushing for more walking on good days, using store scooter only when necessary.    NEUROPATHIC PAIN:  - Changed gabapentin: Switched from 300mg twice daily (breaking tablets in half) to 300mg 3 times daily.    LABS:  - Ordered routine labs including thyroid and glucose tests.  - Included pituitary function tests with next lab draw.  - Complete ordered lab work before the end of the year.    RESPIRATORY CARE:  - Ordered new nebulizer through DME provider.    FOLLOW UP:  - Follow up early next year unless changes occur.          Visit today included increased complexity associated with the care of the episodic problem ckd3 addressed and managing the longitudinal care of the patient due to the serious and/or complex managed problem(s) htn, elev A1c, chronic pain.     Follow up in about 4 months (around 2/4/2025), or if symptoms worsen or fail to improve, for recheck.    This note was generated with the assistance of ambient listening technology. Verbal consent was obtained by the patient and accompanying visitor(s) for the recording of patient appointment to facilitate this note. I attest to having reviewed and edited the generated note for accuracy, though some syntax or spelling errors may persist. Please contact the author of this note for any clarification.

## 2024-10-09 NOTE — TELEPHONE ENCOUNTER
No care due was identified.  [facility] Embedded Care Due Messages. Reference number: [id number].   10/09/2024 4:44:41 AM CDT

## 2024-10-09 NOTE — TELEPHONE ENCOUNTER
Refill Routing Note   Medication(s) are not appropriate for processing by [facility] for the following reason(s):        Required labs outdated  Outside of protocol    [facility] action(s):  Defer  Route        Medication Therapy Plan: Total daily dose exceeds maintenance dosing for PPI      Appointments  past 12m or future 3m with PCP    Date Provider   Last Visit   10/4/2024 Nuzhat Kline MD   Next Visit   1/10/2025 Nuzhat Kline MD   ED visits in past 90 days: 0        Note composed:2:40 PM 10/09/2024

## 2024-10-11 RX ORDER — PANTOPRAZOLE SODIUM 40 MG/1
40 TABLET, DELAYED RELEASE ORAL 2 TIMES DAILY
Qty: 180 TABLET | Refills: 3 | Status: SHIPPED | OUTPATIENT
Start: 2024-10-11

## 2024-10-11 RX ORDER — ATORVASTATIN CALCIUM 20 MG/1
20 TABLET, FILM COATED ORAL
Qty: 90 TABLET | Refills: 3 | Status: SHIPPED | OUTPATIENT
Start: 2024-10-11

## 2024-10-28 RX ORDER — ARIPIPRAZOLE 2 MG/1
2 TABLET ORAL
Qty: 90 TABLET | Refills: 3 | Status: SHIPPED | OUTPATIENT
Start: 2024-10-28

## 2024-10-28 RX ORDER — ALLOPURINOL 100 MG/1
100 TABLET ORAL
Qty: 90 TABLET | Refills: 1 | Status: SHIPPED | OUTPATIENT
Start: 2024-10-28

## 2024-11-20 RX ORDER — FUROSEMIDE 40 MG/1
TABLET ORAL
Qty: 180 TABLET | Refills: 1 | Status: SHIPPED | OUTPATIENT
Start: 2024-11-20

## 2024-11-20 NOTE — TELEPHONE ENCOUNTER
Refill Decision Note   Luda Carlsoncarlos  is requesting a refill authorization.  Brief Assessment and Rationale for Refill:  Approve     Medication Therapy Plan:        Comments:     Note composed:1:18 PM 11/20/2024

## 2024-11-20 NOTE — TELEPHONE ENCOUNTER
No care due was identified.  Health [facility] Embedded Care Due Messages. Reference number: [id number].   11/20/2024 12:52:01 PM CST

## 2024-12-20 ENCOUNTER — LAB VISIT (OUTPATIENT)
Dept: LAB | Facility: HOSPITAL | Age: 79
End: 2024-12-20
Attending: FAMILY MEDICINE
Payer: MEDICARE

## 2024-12-20 DIAGNOSIS — R73.03 PREDIABETES: ICD-10-CM

## 2024-12-20 DIAGNOSIS — Z79.899 HIGH RISK MEDICATION USE: ICD-10-CM

## 2024-12-20 DIAGNOSIS — E23.7 ABNORMALITY OF PITUITARY GLAND: ICD-10-CM

## 2024-12-20 LAB
ALBUMIN SERPL BCP-MCNC: 3.6 G/DL (ref 3.5–5.2)
ALP SERPL-CCNC: 99 U/L (ref 40–150)
ALT SERPL W/O P-5'-P-CCNC: 15 U/L (ref 10–44)
ANION GAP SERPL CALC-SCNC: 12 MMOL/L (ref 8–16)
AST SERPL-CCNC: 17 U/L (ref 10–40)
BASOPHILS # BLD AUTO: 0.06 K/UL (ref 0–0.2)
BASOPHILS NFR BLD: 0.6 % (ref 0–1.9)
BILIRUB SERPL-MCNC: 0.5 MG/DL (ref 0.1–1)
BUN SERPL-MCNC: 35 MG/DL (ref 8–23)
CALCIUM SERPL-MCNC: 10 MG/DL (ref 8.7–10.5)
CHLORIDE SERPL-SCNC: 94 MMOL/L (ref 95–110)
CO2 SERPL-SCNC: 35 MMOL/L (ref 23–29)
CORTIS SERPL-MCNC: 9.7 UG/DL (ref 4.3–22.4)
CREAT SERPL-MCNC: 0.9 MG/DL (ref 0.5–1.4)
DIFFERENTIAL METHOD BLD: ABNORMAL
EOSINOPHIL # BLD AUTO: 0.8 K/UL (ref 0–0.5)
EOSINOPHIL NFR BLD: 7.9 % (ref 0–8)
ERYTHROCYTE [DISTWIDTH] IN BLOOD BY AUTOMATED COUNT: 15.1 % (ref 11.5–14.5)
EST. GFR  (NO RACE VARIABLE): >60 ML/MIN/1.73 M^2
ESTIMATED AVG GLUCOSE: 114 MG/DL (ref 68–131)
FSH SERPL-ACNC: 59.01 MIU/ML
GLUCOSE SERPL-MCNC: 91 MG/DL (ref 70–110)
HBA1C MFR BLD: 5.6 % (ref 4–5.6)
HCT VFR BLD AUTO: 50.9 % (ref 37–48.5)
HGB BLD-MCNC: 15.8 G/DL (ref 12–16)
IMM GRANULOCYTES # BLD AUTO: 0.03 K/UL (ref 0–0.04)
IMM GRANULOCYTES NFR BLD AUTO: 0.3 % (ref 0–0.5)
LH SERPL-ACNC: 25.7 MIU/ML
LYMPHOCYTES # BLD AUTO: 2 K/UL (ref 1–4.8)
LYMPHOCYTES NFR BLD: 20.9 % (ref 18–48)
MCH RBC QN AUTO: 30.7 PG (ref 27–31)
MCHC RBC AUTO-ENTMCNC: 31 G/DL (ref 32–36)
MCV RBC AUTO: 99 FL (ref 82–98)
MONOCYTES # BLD AUTO: 0.8 K/UL (ref 0.3–1)
MONOCYTES NFR BLD: 7.9 % (ref 4–15)
NEUTROPHILS # BLD AUTO: 6.1 K/UL (ref 1.8–7.7)
NEUTROPHILS NFR BLD: 62.4 % (ref 38–73)
NRBC BLD-RTO: 0 /100 WBC
PLATELET # BLD AUTO: 226 K/UL (ref 150–450)
PMV BLD AUTO: 11.7 FL (ref 9.2–12.9)
POTASSIUM SERPL-SCNC: 4.3 MMOL/L (ref 3.5–5.1)
PROLACTIN SERPL IA-MCNC: 7.8 NG/ML (ref 5.2–26.5)
PROT SERPL-MCNC: 7.3 G/DL (ref 6–8.4)
RBC # BLD AUTO: 5.15 M/UL (ref 4–5.4)
SODIUM SERPL-SCNC: 141 MMOL/L (ref 136–145)
T4 FREE SERPL-MCNC: 1 NG/DL (ref 0.71–1.51)
TSH SERPL DL<=0.005 MIU/L-ACNC: 3.2 UIU/ML (ref 0.4–4)
WBC # BLD AUTO: 9.72 K/UL (ref 3.9–12.7)

## 2024-12-20 PROCEDURE — 84443 ASSAY THYROID STIM HORMONE: CPT | Performed by: FAMILY MEDICINE

## 2024-12-20 PROCEDURE — 85025 COMPLETE CBC W/AUTO DIFF WBC: CPT | Performed by: FAMILY MEDICINE

## 2024-12-20 PROCEDURE — 84146 ASSAY OF PROLACTIN: CPT | Performed by: FAMILY MEDICINE

## 2024-12-20 PROCEDURE — 84439 ASSAY OF FREE THYROXINE: CPT | Performed by: FAMILY MEDICINE

## 2024-12-20 PROCEDURE — 83001 ASSAY OF GONADOTROPIN (FSH): CPT | Performed by: FAMILY MEDICINE

## 2024-12-20 PROCEDURE — 36415 COLL VENOUS BLD VENIPUNCTURE: CPT | Mod: PN | Performed by: FAMILY MEDICINE

## 2024-12-20 PROCEDURE — 84305 ASSAY OF SOMATOMEDIN: CPT | Performed by: FAMILY MEDICINE

## 2024-12-20 PROCEDURE — 82024 ASSAY OF ACTH: CPT | Performed by: FAMILY MEDICINE

## 2024-12-20 PROCEDURE — 82533 TOTAL CORTISOL: CPT | Performed by: FAMILY MEDICINE

## 2024-12-20 PROCEDURE — 80053 COMPREHEN METABOLIC PANEL: CPT | Performed by: FAMILY MEDICINE

## 2024-12-20 PROCEDURE — 83036 HEMOGLOBIN GLYCOSYLATED A1C: CPT | Performed by: FAMILY MEDICINE

## 2024-12-20 PROCEDURE — 83002 ASSAY OF GONADOTROPIN (LH): CPT | Performed by: FAMILY MEDICINE

## 2024-12-23 LAB — ACTH PLAS-MCNC: 36 PG/ML (ref 0–46)

## 2025-01-10 ENCOUNTER — OFFICE VISIT (OUTPATIENT)
Dept: FAMILY MEDICINE | Facility: CLINIC | Age: 80
End: 2025-01-10
Payer: MEDICARE

## 2025-01-10 VITALS
HEIGHT: 62 IN | SYSTOLIC BLOOD PRESSURE: 112 MMHG | BODY MASS INDEX: 26.49 KG/M2 | WEIGHT: 143.94 LBS | DIASTOLIC BLOOD PRESSURE: 76 MMHG

## 2025-01-10 DIAGNOSIS — N18.31 CHRONIC KIDNEY DISEASE, STAGE 3A: ICD-10-CM

## 2025-01-10 DIAGNOSIS — I27.20 PULMONARY HYPERTENSION, UNSPECIFIED: ICD-10-CM

## 2025-01-10 DIAGNOSIS — R35.0 URINARY FREQUENCY: Primary | ICD-10-CM

## 2025-01-10 DIAGNOSIS — J96.10 CHRONIC RESPIRATORY FAILURE, UNSPECIFIED WHETHER WITH HYPOXIA OR HYPERCAPNIA: ICD-10-CM

## 2025-01-10 DIAGNOSIS — F03.90 DEMENTIA, UNSPECIFIED DEMENTIA SEVERITY, UNSPECIFIED DEMENTIA TYPE, UNSPECIFIED WHETHER BEHAVIORAL, PSYCHOTIC, OR MOOD DISTURBANCE OR ANXIETY: ICD-10-CM

## 2025-01-10 DIAGNOSIS — J43.2 CENTRILOBULAR EMPHYSEMA: ICD-10-CM

## 2025-01-10 LAB
BILIRUB SERPL-MCNC: NEGATIVE MG/DL
BLOOD URINE, POC: NEGATIVE
CLARITY, POC UA: CLEAR
COLOR, POC UA: NORMAL
GLUCOSE UR QL STRIP: NEGATIVE
KETONES UR QL STRIP: NEGATIVE
LEUKOCYTE ESTERASE URINE, POC: NEGATIVE
NITRITE, POC UA: NEGATIVE
PH, POC UA: 7.5
PROTEIN, POC: NEGATIVE
SPECIFIC GRAVITY, POC UA: 1.01
UROBILINOGEN, POC UA: 0.2

## 2025-01-10 PROCEDURE — 99999 PR PBB SHADOW E&M-EST. PATIENT-LVL IV: CPT | Mod: PBBFAC,,, | Performed by: FAMILY MEDICINE

## 2025-01-10 PROCEDURE — 3074F SYST BP LT 130 MM HG: CPT | Mod: CPTII,S$GLB,, | Performed by: FAMILY MEDICINE

## 2025-01-10 PROCEDURE — 1160F RVW MEDS BY RX/DR IN RCRD: CPT | Mod: CPTII,S$GLB,, | Performed by: FAMILY MEDICINE

## 2025-01-10 PROCEDURE — 3288F FALL RISK ASSESSMENT DOCD: CPT | Mod: CPTII,S$GLB,, | Performed by: FAMILY MEDICINE

## 2025-01-10 PROCEDURE — 1101F PT FALLS ASSESS-DOCD LE1/YR: CPT | Mod: CPTII,S$GLB,, | Performed by: FAMILY MEDICINE

## 2025-01-10 PROCEDURE — 81002 URINALYSIS NONAUTO W/O SCOPE: CPT | Mod: S$GLB,,, | Performed by: FAMILY MEDICINE

## 2025-01-10 PROCEDURE — 99214 OFFICE O/P EST MOD 30 MIN: CPT | Mod: S$GLB,,, | Performed by: FAMILY MEDICINE

## 2025-01-10 PROCEDURE — 1159F MED LIST DOCD IN RCRD: CPT | Mod: CPTII,S$GLB,, | Performed by: FAMILY MEDICINE

## 2025-01-10 PROCEDURE — G2211 COMPLEX E/M VISIT ADD ON: HCPCS | Mod: S$GLB,,, | Performed by: FAMILY MEDICINE

## 2025-01-10 PROCEDURE — 3078F DIAST BP <80 MM HG: CPT | Mod: CPTII,S$GLB,, | Performed by: FAMILY MEDICINE

## 2025-01-10 PROCEDURE — 1125F AMNT PAIN NOTED PAIN PRSNT: CPT | Mod: CPTII,S$GLB,, | Performed by: FAMILY MEDICINE

## 2025-01-10 NOTE — PROGRESS NOTES
Chief Complaint:  Chief Complaint   Patient presents with    Follow-up     Urination all day and knee pain        HPI:  Luda is a 79 y.o. year old     History of Present Illness    CHIEF COMPLAINT:  Luda presents today for knee pain and frequent urination.    KNEE PAIN:  She experiences knee pain with a locking sensation in the socket, causing intermittent episodes of immobility. The pain, located in both front and back of the knee, is exacerbated by rain and cold weather. She uses Voltaren and ice for relief.    URINARY SYMPTOMS:  She reports progressively worsening urinary frequency over the past 2 weeks, urinating approximately 20 times per day with symptoms more pronounced in the morning. She experiences urinary urgency while in seated position, particularly in her recliner.    NEUROLOGICAL SYMPTOMS:  She experiences morning spasticity characterized by involuntary hand movements and difficulty with bed mobility. She takes gabapentin and experiences morning tremors.    RESPIRATORY:  She uses daytime oxygen therapy and has reduced her oxygen use from 2.5 to 0.5, expressing concerns about the amount being too much and reporting discomfort with current oxygen levels.    SKIN:  She has a rash on her wrist where she wears a watch continuously. She uses hydrocortisone and Gold Bond for relief.    CURRENT TREATMENTS:  She wears compression stockings during daytime hours and reports finding them beneficial.      ROS:  Genitourinary: +frequency, +urgency  Musculoskeletal: +joint pain, +joint stiffness, +muscle spasms  Skin: +rash  Neurological: +tremors           Review of Systems   Constitutional:  Positive for fatigue.   Eyes:  Negative for visual disturbance.   Respiratory:  Positive for shortness of breath. Negative for cough.    Cardiovascular:  Positive for chest pain (chronic/stable, epigastric).   Gastrointestinal:  Negative for diarrhea.   Musculoskeletal:  Positive for gait problem.   Neurological:  Positive  "for headaches. Negative for facial asymmetry, weakness and numbness.   Psychiatric/Behavioral:  Positive for confusion.          Past Medical History:  Past Medical History:   Diagnosis Date    Anticoagulant long-term use     Anxiety 09/26/2016    Back pain     Closed fracture of second lumbar vertebra with routine healing 12/07/2015    COPD (chronic obstructive pulmonary disease) 09/26/2016    Emphysema of lung     Encounter for blood transfusion     Gastric ulcer     Hypertension     Tibia/fibula fracture 01/24/2022         Vitals:  Vitals:    01/10/25 1026   BP: 112/76   Weight: 65.3 kg (143 lb 15.4 oz)   Height: 5' 2" (1.575 m)   PainSc:   8       BP Readings from Last 5 Encounters:   01/10/25 112/76   10/04/24 114/78   08/19/24 109/72   05/17/24 118/70   03/12/24 116/70       The 10-year ASCVD risk score (Leny ALY, et al., 2019) is: 18.8%    Values used to calculate the score:      Age: 79 years      Sex: Female      Is Non- : No      Diabetic: No      Tobacco smoker: No      Systolic Blood Pressure: 112 mmHg      Is BP treated: No      HDL Cholesterol: 47 mg/dL      Total Cholesterol: 174 mg/dL      Physical Exam:  Physical Exam  Vitals and nursing note reviewed.   Constitutional:       General: She is not in acute distress.     Appearance: Normal appearance. She is well-developed.   HENT:      Head: Normocephalic and atraumatic.   Eyes:      General: No scleral icterus.        Right eye: No discharge.         Left eye: No discharge.      Conjunctiva/sclera: Conjunctivae normal.   Cardiovascular:      Rate and Rhythm: Normal rate and regular rhythm.   Pulmonary:      Effort: Pulmonary effort is normal. No respiratory distress.      Breath sounds: Decreased air movement present. Decreased breath sounds present.   Abdominal:      General: Bowel sounds are normal. There is no distension.      Palpations: Abdomen is soft.      Tenderness: There is no guarding.   Musculoskeletal:         " General: Tenderness present. No deformity or signs of injury.      Cervical back: Neck supple. No rigidity.      Right lower leg: No edema.      Left lower leg: No edema.   Skin:     General: Skin is warm and dry.      Findings: No rash.   Neurological:      General: No focal deficit present.      Mental Status: She is alert and oriented to person, place, and time.   Psychiatric:         Mood and Affect: Mood normal.         Behavior: Behavior normal.             Assessment & Plan:  Urinary frequency  -     POCT URINE DIPSTICK WITHOUT MICROSCOPE    Centrilobular emphysema  -     NEBULIZER FOR HOME USE    Dementia, unspecified dementia severity, unspecified dementia type, unspecified whether behavioral, psychotic, or mood disturbance or anxiety  Comments:  relatively stable, slow progression per family    Chronic respiratory failure, unspecified whether with hypoxia or hypercapnia  Comments:  some fluctuations in oxygen requirements of late, overall stable    Pulmonary hypertension, unspecified  Comments:  cont pulm f/u    Chronic kidney disease, stage 3a         Assessment & Plan    ORDERS:   Ordered urinalysis to confirm UTI diagnosis    IMPRESSION:  Suspect UTI based on recent onset of frequent urination and worsening tremors; plan to treat empirically  Current gabapentin regimen may need adjustment once UTI is addressed  Reassessed oxygen needs; aim for 1.5-2L as tolerated with target O2 sat 88-92%  Consider broader spectrum antibiotic to cover both bladder and potential lung infection    PLAN SUMMARY:   Ordered urinalysis to confirm UTI diagnosis   Started hydrocortisone cream for rash on wrist   Started antibiotic for suspected UTI   Continued current gabapentin regimen   Follow up after the weekend to reassess morning tremors and overall symptoms    PATIENT EDUCATION:   Educated on COPD patients' typical oxygen tolerance, explaining why pushing beyond 2L may cause discomfort   Discussed insurance coverage  "changes throughout the year, including the concept of the "donut hole" affecting prescription costs    ACTION ITEMS/LIFESTYLE:   Luda to take off or loosen watch at night to reduce skin irritation   Recommend using small pillow under knees when reclining to maintain slight knee bend for comfort    MEDICATIONS:   Started antibiotic for suspected UTI   Continued current gabapentin regimen   Started hydrocortisone cream for rash on wrist    FOLLOW UP:   Follow up after the weekend to reassess morning tremors and overall symptoms   Contact the office if symptoms worsen or new concerns arise         Visit today included increased complexity associated with the care of the episodic problem urinary frequency addressed and managing the longitudinal care of the patient due to the serious and/or complex managed problem(s) copd, dementia, crf.    This note was generated with the assistance of ambient listening technology. Verbal consent was obtained by the patient and accompanying visitor(s) for the recording of patient appointment to facilitate this note. I attest to having reviewed and edited the generated note for accuracy, though some syntax or spelling errors may persist. Please contact the author of this note for any clarification.    "

## 2025-01-13 ENCOUNTER — PATIENT MESSAGE (OUTPATIENT)
Dept: FAMILY MEDICINE | Facility: CLINIC | Age: 80
End: 2025-01-13
Payer: MEDICARE

## 2025-01-13 DIAGNOSIS — N30.80 ACUTE BACTERIAL SIMPLE CYSTITIS: Primary | ICD-10-CM

## 2025-01-13 DIAGNOSIS — B96.89 ACUTE BACTERIAL SIMPLE CYSTITIS: Primary | ICD-10-CM

## 2025-01-13 RX ORDER — AMOXICILLIN AND CLAVULANATE POTASSIUM 875; 125 MG/1; MG/1
1 TABLET, FILM COATED ORAL 2 TIMES DAILY
Qty: 14 TABLET | Refills: 0 | Status: SHIPPED | OUTPATIENT
Start: 2025-01-13

## 2025-01-13 NOTE — TELEPHONE ENCOUNTER
Sent augmentin. The urine did not come back with anything, but I'd like to treat regardless given the change in her symptoms. Lmk if improving or not later this week.

## 2025-01-30 PROBLEM — F03.90 DEMENTIA, UNSPECIFIED DEMENTIA SEVERITY, UNSPECIFIED DEMENTIA TYPE, UNSPECIFIED WHETHER BEHAVIORAL, PSYCHOTIC, OR MOOD DISTURBANCE OR ANXIETY: Status: ACTIVE | Noted: 2025-01-30

## 2025-02-13 ENCOUNTER — TELEPHONE (OUTPATIENT)
Dept: NEUROLOGY | Facility: CLINIC | Age: 80
End: 2025-02-13
Payer: MEDICARE

## 2025-02-13 NOTE — TELEPHONE ENCOUNTER
----- Message from Cayla sent at 2/13/2025  7:44 AM CST -----  Contact: Gabrielle/Juanita  Type:  Sooner Appointment Request    Caller is requesting a sooner appointment.  Caller declined first available appointment listed below.  Caller will not accept being placed on the waitlist and is requesting a message be sent to doctor.    Name of Caller:  daughter  When is the first available appointment?  2/27     Would the patient rather a call back or a response via [facility]? call  Best Call Back Number:  [phone]  Additional Information:  daughter needs to change the date of this appt please call Gabrielle to discuss possibilities of dates.

## 2025-03-24 DIAGNOSIS — Z00.00 ENCOUNTER FOR MEDICARE ANNUAL WELLNESS EXAM: ICD-10-CM

## 2025-04-17 RX ORDER — CLOPIDOGREL BISULFATE 75 MG/1
75 TABLET ORAL
Qty: 90 TABLET | Refills: 2 | Status: SHIPPED | OUTPATIENT
Start: 2025-04-17

## 2025-04-17 NOTE — TELEPHONE ENCOUNTER
Care Due:                  Date            Visit Type   Department     Provider  --------------------------------------------------------------------------------                                EP -                              PRIMARY      [facility] FAMILY  Last Visit: 01-      CARE ([facility])   MEDICINE       Nuzhat Kline  Next Visit: None Scheduled  None         None Found                                                            Last  Test          Frequency    Reason                     Performed    Due Date  --------------------------------------------------------------------------------    Lipid Panel.  12 months..  atorvastatin.............  05- 04-    Uric Acid...  12 months..  allopurinoL..............  Not Found    Overdue    [employer] Embedded Care Due Messages. Reference number: [id number].   4/17/2025 4:32:12 PM CDT

## 2025-04-17 NOTE — TELEPHONE ENCOUNTER
Provider Staff:  Action required for this patient    Requires labs      Please see care gap opportunities below in Care Due Message.    Thanks!  [facility]     Appointments      Date Provider   Last Visit   1/10/2025 Nuzhat Kline MD   Next Visit   Visit date not found Nuzhat Kline MD     Refill Decision Note   Luda Peters  is requesting a refill authorization.    Brief Assessment and Rationale for Refill:   Approve       Medication Therapy Plan:          Comments:     Note composed:5:30 PM 04/17/2025

## 2025-04-20 NOTE — TELEPHONE ENCOUNTER
No care due was identified.  [facility] Embedded Care Due Messages. Reference number: [id number].   4/20/2025 3:34:20 PM CDT

## 2025-04-21 NOTE — TELEPHONE ENCOUNTER
Refill Routing Note   Medication(s) are not appropriate for processing by [facility] for the following reason(s):        Required labs outdated    [facility] action(s):  Defer             Appointments  past 12m or future 3m with PCP    Date Provider   Last Visit   1/10/2025 Nuzhat Kline MD   Next Visit   Visit date not found Nuzhat Kline MD   ED visits in past 90 days: 0        Note composed:4:32 PM 04/21/2025

## 2025-04-22 RX ORDER — ALLOPURINOL 100 MG/1
100 TABLET ORAL
Qty: 90 TABLET | Refills: 1 | Status: SHIPPED | OUTPATIENT
Start: 2025-04-22

## 2025-05-19 RX ORDER — FUROSEMIDE 40 MG/1
40 TABLET ORAL 2 TIMES DAILY
Qty: 180 TABLET | Refills: 2 | Status: SHIPPED | OUTPATIENT
Start: 2025-05-19

## 2025-05-19 NOTE — TELEPHONE ENCOUNTER
Refill Decision Note   Luda Fran  is requesting a refill authorization.    Brief Assessment and Rationale for Refill:   Approve       Medication Therapy Plan:          Comments:     Note composed:5:19 PM 05/19/2025

## 2025-05-19 NOTE — TELEPHONE ENCOUNTER
No care due was identified.  [facility] Embedded Care Due Messages. Reference number: [id number].   5/19/2025 8:19:50 AM CDT

## 2025-05-22 ENCOUNTER — OFFICE VISIT (OUTPATIENT)
Dept: NEUROLOGY | Facility: CLINIC | Age: 80
End: 2025-05-22
Payer: MEDICARE

## 2025-05-22 VITALS
DIASTOLIC BLOOD PRESSURE: 64 MMHG | BODY MASS INDEX: 30.71 KG/M2 | SYSTOLIC BLOOD PRESSURE: 162 MMHG | HEART RATE: 73 BPM | RESPIRATION RATE: 19 BRPM | WEIGHT: 166.88 LBS | HEIGHT: 62 IN

## 2025-05-22 DIAGNOSIS — R41.3 MEMORY LOSS: Primary | ICD-10-CM

## 2025-05-22 DIAGNOSIS — Z86.73 HISTORY OF LACUNAR CEREBROVASCULAR ACCIDENT (CVA): ICD-10-CM

## 2025-05-22 PROCEDURE — 99499 UNLISTED E&M SERVICE: CPT | Mod: S$GLB,,, | Performed by: NURSE PRACTITIONER

## 2025-05-22 PROCEDURE — 99483 ASSMT & CARE PLN PT COG IMP: CPT | Mod: S$GLB,,, | Performed by: NURSE PRACTITIONER

## 2025-05-22 PROCEDURE — 99999 PR PBB SHADOW E&M-EST. PATIENT-LVL V: CPT | Mod: PBBFAC,,, | Performed by: NURSE PRACTITIONER

## 2025-05-22 NOTE — PROGRESS NOTES
Caregiver name:  Gabrielle  Relationship to the patient:  daughter  Does the patient have a living will? No  Does the patient have a designated healthcare POA? No  Does the patient have a designated general POA? No    Have educational materials/resources been provided? Yes      Activities of Daily Living    Bathing: Needs Help  Dressing: needs help  Grooming: Independent  Mouth Care: Independent  Toileting: Needs Help  Transferring Bed/Chair: Needs Help  Walking: Needs Help  Climbing: Cannot Do  Eating: Independent      Instrumental Activities of Daily Living    Shopping: Needs Help  Cooking: Needs Help  Managing Medications: Needs Help  Using the phone and looking up numbers: Independent  Doing Housework: Needs Help  Doing Laundry: Needs Help  Driving or using public transportation: Cannot Do  Managing finances: Needs Help    Functional Assessment Staging  4   Decreased ability to perform complex tasks, e.g. planning dinner for guests, handling personal finances (such as forgetting to pay bills), difficulty marketing, etc.*             5/22/2025     2:02 PM 1/10/2025    10:35 AM 1/26/2024     2:07 PM 5/5/2023    10:09 AM 7/26/2019     8:45 AM   Depression Patient Health Questionnaire   Over the last two weeks how often have you been bothered by little interest or pleasure in doing things Not at all Not at all Not at all Not at all Not at all    Over the last two weeks how often have you been bothered by feeling down, depressed or hopeless Not at all Not at all Not at all Not at all Not at all    PHQ-2 Total Score 0 0 0 0 0       Data saved with a previous flowsheet row definition

## 2025-05-22 NOTE — PROGRESS NOTES
NEUROLOGY  Outpatient Follow Up    [facility]  [address] [facility], [place] [address]  [phone] (office) / [phone] (fax)    Patient Name:  Luda Peters  :  1945  MR #:  [id number]  Acct #:  [id number]    Date of Neurology Visit: 2025  Name of Provider: RIAN Williamson    Other Physicians:  Nuzhat Kline MD (Primary Care Physician); No ref. provider found (Referring)      Chief Complaint: Memory Loss      History of Present Illness (HPI):  Luda Peters is a right handed 79 y.o. female  with a PMHX of back pain, COPD, Anxiety, emphysema, HTN, gastric ulcer      Patient presents today to discuss MRI brain scan results.   Family states last October her PCP ordered a CT scan for reported left face and neck pain. CTH at that time did show old infarcts.     Patient was admitted in July for COPD and hypertension. Family requested she have updated brain imaging while inpatient for ongoing confusion and gait disturbance. PCP did order an updated CT scan in march which appeared to show stable/chronic infarcts. A MRI brain scan was then ordered. Again, white matter changes as well as remote infarcts were noted.     Daughter states she continues to have confusion. She will forget if she took medications. Daughter does help manage this. She will also misplace items. Daughter does manage her finances. Daughter states patient has mentioned seeing dead relatives. Patient states she does not see the, This has been going on for the last 6-12 mos. She is no longer driving. Patient is independent with ADLs.  She likes puzzles, reading and doing word searches.         Interval Hx 2025:   Patient presents today for memory follow up. She is accompanied by her daughter who helps supply information. Patient does not report memory issues. Daughter states she will have to repeat often and will forget short term events. She lives with her daughter.     Patient's  "highest level of education completed was 12 th grade but did not graduate. She did many different occupations. Patient believes her memory issues are age-related. Daughter states memory issues have been ongoing for several years (10+).  Both short and long term memory is affected. Daughter does believes that a history of her medical issues (COPD) likely play a role in her cognition. There no personality changes. Sometimes she does feel depressed. She takes Abilify and Lexapro prescribed by her PCP. These have offered aid. Patient denies hallucinations but daughter reports this stating she is seeing things that aren't there. Patent states she is joking with her daughter about this. Sleep is OK. She does have moderate MOLLY but refuses the CPAP.  No RBD. Daughter is managing the finances and her medications. She may have some incontinence as she can't make it to he bathroom. She may need assistance with ADLs. Daughter is concerned about her having a UTI as she has noticed increased saturated depends. No recent falls. She is not driving.     She likes reading the newspaper and doing crossword puzzles. Its believed her sister has dementia.   She denies a history of alcoholism/drug use              Past Medical, Surgical, Family & Social History:   Reviewed and updated.    Home Medications:   Current Medications[1]    Physical Examination:  BP (!) 162/64 (BP Location: Left arm, Patient Position: Sitting)   Pulse 73   Resp 19   Ht 5' 2" (1.575 m)   Wt 75.7 kg (166 lb 14.2 oz)   BMI 30.52 kg/m²               GENERAL:  General appearance: Well, non-toxic appearing.  No apparent distress.  Neck: supple.     MENTAL STATUS:  Alertness, attention span & concentration: normal.  Language: normal.  Orientation to self, place & time:  normal.  Memory, recent & remote: normal.  Fund of knowledge: normal.  MMSE: 17/30  MMSE~2: 14/16 (8/2024)       SPEECH:  Clear and fluent.  Follows complex commands.        CRANIAL " NERVES:  Cranial Nerves II-XII were examined.  II - Visual fields: normal.  III, IV, VI: PERRL, EOMI, No ptosis, No nystagmus.  V - Facial sensation: normal.  VII - Face symmetry & mobility: normal.  VIII - Hearing: normal  IX, X - Palate: mobile & midline.  XI - Shoulder shrug: normal.  XII - Tongue protrusion: normal.           GROSS MOTOR:  Gait & station: did not assess today; in WC  Tone: normal.  Abnormal movements: none.  Finger-nose: normal.  Rapid alternating movements: normal.  Pronator drift: normal        MUSCLE STRENGTH:  tactile pain      RIGHT      LEFT   5 Deltoids 4+   5 Biceps 5   5 Triceps 5   5 Forearm.Pr. 5           CLAIR Iliopsoas flex    CLAIR   4 Hip Abduct 4   4 Hip Adduct 4   CLAIR Quads CLAIR   CLAIR Hams CLAIR      REFLEXES:     RIGHT Reflex    LEFT   2+ Biceps 2+   2+ Brachiorad. 2+           2+ Patellar 2+      SENSORY:  Light touch: Normal throughout.              Diagnostic Data Reviewed:   I have personally reviewed provider notes, labs and imaging made available to me today.     Imaging:  MRI Brain Without Contrast 7/2024     Narrative  EXAMINATION:  MRI BRAIN WITHOUT CONTRAST     CLINICAL HISTORY:  Mental status change, unknown cause; Other abnormal findings on diagnostic imaging of central nervous system     TECHNIQUE:  Multisequence, multiplanar imaging through the brain performed without IV contrast.     COMPARISON:  03/22/2024 CT head     FINDINGS:  Diffusion-weighted imaging is negative for acute ischemia or focal lesion.  Gradient echo images demonstrate basal ganglia linear susceptibility artifact likely related to remote microhemorrhage.  Additional small punctate foci of susceptibility artifact involving parietal and occipital lobes likely on the basis of remote blood products.     No intracranial mass, midline shift, or mass effect.  Ventricles and sulci are normal in size for age.  Moderate periventricular and deep white matter T2/FLAIR hyperintensity consistent with  microangiopathic change.  Cerebellar hemispheres and brainstem are unremarkable.  Major intracranial T2 flow voids are patent.     Mastoid air cells are clear.  Left maxillary sinus fluid layering dependently.     No bone marrow signal abnormality.  Abnormal appearance of the pituitary gland, with greater than expected soft tissue signal intensity along the rightward and posterior aspect of the sella.  This correlates to findings on previous CT scans.     Impression  Negative for acute ischemia or acute intracranial pathology.     Abnormal appearance of the pituitary gland/sella, for which dedicated pituitary protocol MRI follow-up is recommended.     Moderate white matter microangiopathic changes, and evidence of remote basal ganglia infarctions.        CT Head Without Contrast 3/2024     Narrative  EXAMINATION:  CT HEAD WITHOUT CONTRAST     CLINICAL HISTORY:  Stroke, follow up; Personal history of transient ischemic attack (TIA), and cerebral infarction without residual deficits     TECHNIQUE:  Low dose axial CT images obtained throughout the head without intravenous contrast. Sagittal and coronal reconstructions were performed.     COMPARISON:  Head CT 10/13/2023.     FINDINGS:  Brain: Redemonstrated chronic lacunar type infarcts within the bilateral basal ganglia and right thalamus without CT evidence of an acute major vascular territory infarct, intracranial hemorrhage, or space-occupying extra-axial fluid collections.  Superimposed scattered low densities throughout the cerebral hemispheric white matter consistent with age related chronic small vessel ischemic changes similar to the prior exam.  No mass effect.     There is nonspecific soft tissue fullness within the sella somewhat better characterized on the current study roughly measuring 1.4 x 1.4 x 1.1 cm in maximal AP by TV by CC dimensions with potential involvement of the right cavernous sinus.     Ventricles: The ventricles, sulci, and cisterns are  "within normal limits.     Skull: The osseous structures are unremarkable in appearance.     Extracranial soft tissues: Limited imaging is within normal limits.     Other: No acute orbital process identified.  Trace fluid within the left mastoid air cells.  Right mastoid is clear.  Mild scattered mucosal thickening within the paranasal sinuses and trace fluid level within the left maxillary sinus unchanged.     Impression  1. Chronic small vessel ischemic changes including several lacunar-type infarcts similar to the prior examination without evidence of an acute process.  2. Soft tissue fullness within the sella is nonspecific and could represent an underlying adenoma or small meningioma.  Correlation with biochemical markers and potential routine MRI pituitary follow-up is recommended if clinically indicated/feasible.        HST   - MELISSA 24.6 mod MOLLY    Labs:  Lab Results   Component Value Date    WBC 9.72 12/20/2024    HGB 15.8 12/20/2024    HCT 50.9 (H) 12/20/2024     12/20/2024    MCV 99 (H) 12/20/2024    RDW 15.1 (H) 12/20/2024     Lab Results   Component Value Date     12/20/2024    K 4.3 12/20/2024    CL 94 (L) 12/20/2024    CO2 35 (H) 12/20/2024    BUN 35 (H) 12/20/2024    CREATININE 0.9 12/20/2024    GLU 91 12/20/2024    CALCIUM 10.0 12/20/2024    MG 2.1 07/27/2020     Lab Results   Component Value Date    PROT 7.3 12/20/2024    ALBUMIN 3.6 12/20/2024    BILITOT 0.5 12/20/2024    AST 17 12/20/2024    ALKPHOS 99 12/20/2024    ALT 15 12/20/2024     No results found for: "INR", "PROTIME", "PTT"  Lab Results   Component Value Date    CHOL 174 05/05/2023    HDL 47 05/05/2023    LDLCALC 97.8 05/05/2023    TRIG 146 05/05/2023    CHOLHDL 27.0 05/05/2023     Lab Results   Component Value Date    HGBA1C 5.6 12/20/2024      Lab Results   Component Value Date    LRNZBNZX41 393 05/05/2023     Lab Results   Component Value Date    FOLATE 10.3 05/05/2023     Lab Results   Component Value Date    TSH 3.199 " "12/20/2024     No results found for: "RPR"  No components found for: "THIAMINE"  No components found for: "HIV 1/2 AG/AB"                    Assessment and Plan:    Problem List Items Addressed This Visit          Neuro    Memory loss - Primary    Current Assessment & Plan   Reports of increased confusion, misplacing items and memory loss by family.  There are no reports of behavioral changes.  Mood is better controlled with Abilify and Lexapro. Visual hallucinations are questionable as pt denies this but rather thinks her dead relatives are there in spirit  MMSE today 17/30   - poor attention on testing   - suspect h/o strokes, medical issues, untreated sleep apnea, medications and mood play a role   MRI brain scan noted several small lacunes and moderate white matter changes  Serologies noted  Pt is not interested in alternative options for CPAP; she does use supplemental O2 nightly.   ACB score: 4   - pt is at higher risk of confusion, falls and death.   Would recommend monitoring cognition overtime via clinic testing or Neuro psych testing  Discussed role vs expectation of cognitive enhancing medications at length    - Pt is not interested in cognitive enhancing medication at this time  Encouraged pt to f/u with PCP regarding suspected UTI         History of lacunar cerebrovascular accident (CVA)    Current Assessment & Plan   Recent CTH and MRI brain scan noted with several lacunes within bilateral BG and right thalamus.   Stable when compared to previous CTH done in Oct 2023.  Cont ASA 81 mg QD for secondary stroke prevention  Work with PCP on controlling vascular risk factors   - BP management   - LDL 97; goal LDL is < 70   - A1c 5.6   - diet modification                         Important to note, also  has a past medical history of Anticoagulant long-term use, Anxiety (09/26/2016), Back pain, Closed fracture of second lumbar vertebra with routine healing (12/07/2015), COPD (chronic obstructive pulmonary " disease) (09/26/2016), Emphysema of lung, Encounter for blood transfusion, Gastric ulcer, Hypertension, and Tibia/fibula fracture (01/24/2022).          The patient will return to clinic in 6 months.    All questions were answered and patient is comfortable with the plan.         Thank you very much for the opportunity to assist in this patient's care.    If you have any questions or concerns, please do not hesitate to contact me at any time.      Sincerely,     RIAN Williamson  [facility] [place]            [1]   Current Outpatient Medications:     acetaminophen (TYLENOL) 500 MG tablet, Take 500 mg by mouth Daily., Disp: , Rfl:     acetaminophen (TYLENOL) 650 MG TbSR, Take 1,300 mg by mouth 2 (two) times daily., Disp: , Rfl:     albuterol (PROVENTIL/VENTOLIN HFA) 90 mcg/actuation inhaler, Inhale 2 puffs into the lungs every 6 (six) hours as needed for Wheezing or Shortness of Breath. Rescue, Disp: 3 g, Rfl: 6    albuterol-ipratropium (DUO-NEB) 2.5 mg-0.5 mg/3 mL nebulizer solution, Take 3 mLs by nebulization every 6 (six) hours as needed for Wheezing. Rescue, Disp: 75 mL, Rfl: 5    allopurinoL (ZYLOPRIM) 100 MG tablet, TAKE 1 TABLET(100 MG) BY MOUTH EVERY DAY, Disp: 90 tablet, Rfl: 1    ARIPiprazole (ABILIFY) 2 MG Tab, TAKE 1 TABLET(2 MG) BY MOUTH EVERY DAY, Disp: 90 tablet, Rfl: 3    ascorbic acid, vitamin C, (VITAMIN C) 1000 MG tablet, Take 1,000 mg by mouth daily as needed (immune support)., Disp: , Rfl:     aspirin (ECOTRIN) 81 MG EC tablet, Take 81 mg by mouth once daily. (Patient taking differently: Take 81 mg by mouth daily as needed.), Disp: , Rfl:     atorvastatin (LIPITOR) 20 MG tablet, TAKE 1 TABLET(20 MG) BY MOUTH EVERY DAY, Disp: 90 tablet, Rfl: 3    budesonide (PULMICORT) 0.5 mg/2 mL nebulizer solution, USE 1 VIAL VIA NEBULIZER TWICE DAILY( EVERY 12 HOURS) AS NEEDED FOR WHEEZING, Disp: 120 mL, Rfl: 3    clopidogreL (PLAVIX) 75 mg tablet, TAKE 1 TABLET(75 MG) BY MOUTH  EVERY DAY, Disp: 90 tablet, Rfl: 2    diclofenac sodium (VOLTAREN) 1 % Gel, APPLY 2 GRAMS TOPICALLY TO THE AFFECTED AREA TWICE DAILY, Disp: 100 g, Rfl: 1    docusate sodium (COLACE) 100 MG capsule, Take 100 mg by mouth 2 (two) times daily., Disp: , Rfl:     EScitalopram oxalate (LEXAPRO) 20 MG tablet, TAKE 1 TABLET(20 MG) BY MOUTH EVERY EVENING, Disp: 90 tablet, Rfl: 3    fluticasone-umeclidin-vilanter (TRELEGY ELLIPTA) 100-62.5-25 mcg DsDv, Inhale 1 puff into the lungs once daily., Disp: 180 each, Rfl: 3    furosemide (LASIX) 40 MG tablet, TAKE 1 TABLET BY MOUTH TWICE DAILY, Disp: 180 tablet, Rfl: 2    gabapentin (NEURONTIN) 300 MG capsule, Take 1 capsule (300 mg total) by mouth 3 (three) times daily., Disp: 90 capsule, Rfl: 11    metoprolol tartrate (LOPRESSOR) 25 MG tablet, Take 1 tablet (25 mg total) by mouth 2 (two) times daily., Disp: 180 tablet, Rfl: 3    multivitamin (THERAGRAN) per tablet, Take 1 tablet by mouth daily as needed (immune support)., Disp: , Rfl:     nortriptyline (PAMELOR) 25 MG capsule, TAKE 1 CAPSULE(25 MG) BY MOUTH EVERY EVENING, Disp: 90 capsule, Rfl: 3    omega-3 fatty acids/fish oil (FISH OIL-OMEGA-3 FATTY ACIDS) 300-1,000 mg capsule, Take 1 capsule by mouth once daily., Disp: , Rfl:     pantoprazole (PROTONIX) 40 MG tablet, TAKE 1 TABLET BY MOUTH TWICE DAILY, Disp: 180 tablet, Rfl: 3    zinc acetate 50 mg (zinc) Cap, Take 0.5 tablets by mouth daily as needed (immune support)., Disp: , Rfl:

## 2025-05-22 NOTE — ASSESSMENT & PLAN NOTE
Reports of increased confusion, misplacing items and memory loss by family.  There are no reports of behavioral changes.  Mood is better controlled with Abilify and Lexapro. Visual hallucinations are questionable as pt denies this but rather thinks her dead relatives are there in spirit  MMSE today 17/30   - poor attention on testing   - suspect h/o strokes, medical issues, untreated sleep apnea, medications and mood play a role   MRI brain scan noted several small lacunes and moderate white matter changes  Serologies noted  Pt is not interested in alternative options for CPAP; she does use supplemental O2 nightly.   ACB score: 4   - pt is at higher risk of confusion, falls and death.   Would recommend monitoring cognition overtime via clinic testing or Neuro psych testing  Discussed role vs expectation of cognitive enhancing medications at length    - Pt is not interested in cognitive enhancing medication at this time  Encouraged pt to f/u with PCP regarding suspected UTI

## 2025-05-23 ENCOUNTER — OFFICE VISIT (OUTPATIENT)
Dept: FAMILY MEDICINE | Facility: CLINIC | Age: 80
End: 2025-05-23
Payer: MEDICARE

## 2025-05-23 VITALS
WEIGHT: 166.13 LBS | HEART RATE: 68 BPM | HEIGHT: 62 IN | BODY MASS INDEX: 30.57 KG/M2 | DIASTOLIC BLOOD PRESSURE: 48 MMHG | SYSTOLIC BLOOD PRESSURE: 138 MMHG

## 2025-05-23 DIAGNOSIS — R35.0 URINARY FREQUENCY: Primary | ICD-10-CM

## 2025-05-23 LAB
BILIRUBIN, UA POC OHS: NEGATIVE
BLOOD, UA POC OHS: NEGATIVE
CLARITY, UA POC OHS: CLEAR
COLOR, UA POC OHS: YELLOW
GLUCOSE, UA POC OHS: NEGATIVE
KETONES, UA POC OHS: NEGATIVE
LEUKOCYTES, UA POC OHS: NEGATIVE
NITRITE, UA POC OHS: NEGATIVE
PH, UA POC OHS: 6
PROTEIN, UA POC OHS: NEGATIVE
SPECIFIC GRAVITY, UA POC OHS: 1.01
UROBILINOGEN, UA POC OHS: 0.2

## 2025-05-23 PROCEDURE — 99999 PR PBB SHADOW E&M-EST. PATIENT-LVL III: CPT | Mod: PBBFAC,,, | Performed by: INTERNAL MEDICINE

## 2025-05-23 RX ORDER — OXYBUTYNIN CHLORIDE 5 MG/1
5 TABLET, EXTENDED RELEASE ORAL DAILY PRN
Qty: 30 TABLET | Refills: 0 | Status: SHIPPED | OUTPATIENT
Start: 2025-05-23 | End: 2026-05-23

## 2025-05-23 NOTE — PROGRESS NOTES
Assessment and Plan:    1. Urinary frequency (Primary)  Discussed possible causes of urinary frequency in great detail.  Discussed options with patient including trial of low-dose Ditropan as needed only on the days when the frequency is really bothering her, vaginal estrogen, pelvic floor physical therapy, and timed voids.  Of these options, she reports she will consider timed voids but also would like to try Ditropan.  Follow-up with myself or PCP as needed.  - oxybutynin (DITROPAN-XL) 5 MG TR24; Take 1 tablet (5 mg total) by mouth daily as needed (urinary frequency).  Dispense: 30 tablet; Refill: 0  - POCT Urinalysis(Instrument)      ______________________________________________________________________  Subjective:    Chief Complaint:  Urinary frequency    HPI:  Luda is a 80 y.o. year old female here for evaluation of urinary frequency.      She reports that for the last four days she has had significantly increased frequency of urination.  She denies any pain or burning with urination.  She has not been having fevers or chills.  She has not had nausea or vomiting.  She has no suprapubic pain or pressure.  No flank pain.  No blood in urine.    On review of chart, she had similar symptoms evaluated by PCP in January of this year.  Also did not have a UTI at that time.      She does take Lasix b.i.d. for diastolic dysfunction.  She reports that while this does make her urinate more, it is significantly increased in the last few days beyond what is normal for her.    She does have some episodes of urinary incontinence as she is not able to get to the bathroom in time.      Medications:  Medications Ordered Prior to Encounter[1]    Review of Systems:  Review of Systems   Genitourinary:  Positive for frequency and urgency. Negative for difficulty urinating, dysuria, flank pain, hematuria, pelvic pain and vaginal discharge.   Neurological:  Negative for dizziness and light-headedness.       Past Medical History:  Past  "Medical History:   Diagnosis Date    Anticoagulant long-term use     Anxiety 09/26/2016    Back pain     Closed fracture of second lumbar vertebra with routine healing 12/07/2015    COPD (chronic obstructive pulmonary disease) 09/26/2016    Emphysema of lung     Encounter for blood transfusion     Gastric ulcer     Hypertension     Tibia/fibula fracture 01/24/2022       Objective:    Vitals:  Vitals:    05/23/25 1011   BP: (!) 138/48   Pulse: 68   Weight: 75.4 kg (166 lb 1.9 oz)   Height: 5' 2" (1.575 m)   PainSc:   5       Physical Exam  Vitals reviewed.   Constitutional:       General: She is not in acute distress.     Appearance: She is well-developed.   Eyes:      General:         Right eye: No discharge.         Left eye: No discharge.      Conjunctiva/sclera: Conjunctivae normal.   Cardiovascular:      Rate and Rhythm: Normal rate and regular rhythm.   Pulmonary:      Effort: Pulmonary effort is normal. No respiratory distress.   Skin:     General: Skin is warm and dry.   Neurological:      Mental Status: She is alert and oriented to person, place, and time.   Psychiatric:         Behavior: Behavior normal.         Thought Content: Thought content normal.         Judgment: Judgment normal.         Data:  UA all normal, negative nitrite, negative leukocyte esterase    Amada Newman MD  Internal Medicine         [1]   Current Outpatient Medications on File Prior to Visit   Medication Sig Dispense Refill    acetaminophen (TYLENOL) 500 MG tablet Take 500 mg by mouth Daily.      acetaminophen (TYLENOL) 650 MG TbSR Take 1,300 mg by mouth 2 (two) times daily.      albuterol (PROVENTIL/VENTOLIN HFA) 90 mcg/actuation inhaler Inhale 2 puffs into the lungs every 6 (six) hours as needed for Wheezing or Shortness of Breath. Rescue 3 g 6    albuterol-ipratropium (DUO-NEB) 2.5 mg-0.5 mg/3 mL nebulizer solution Take 3 mLs by nebulization every 6 (six) hours as needed for Wheezing. Rescue 75 mL 5    allopurinoL (ZYLOPRIM) 100 " MG tablet TAKE 1 TABLET(100 MG) BY MOUTH EVERY DAY 90 tablet 1    ARIPiprazole (ABILIFY) 2 MG Tab TAKE 1 TABLET(2 MG) BY MOUTH EVERY DAY 90 tablet 3    ascorbic acid, vitamin C, (VITAMIN C) 1000 MG tablet Take 1,000 mg by mouth daily as needed (immune support).      aspirin (ECOTRIN) 81 MG EC tablet Take 81 mg by mouth once daily. (Patient taking differently: Take 81 mg by mouth daily as needed.)      atorvastatin (LIPITOR) 20 MG tablet TAKE 1 TABLET(20 MG) BY MOUTH EVERY DAY 90 tablet 3    budesonide (PULMICORT) 0.5 mg/2 mL nebulizer solution USE 1 VIAL VIA NEBULIZER TWICE DAILY( EVERY 12 HOURS) AS NEEDED FOR WHEEZING 120 mL 3    clopidogreL (PLAVIX) 75 mg tablet TAKE 1 TABLET(75 MG) BY MOUTH EVERY DAY 90 tablet 2    diclofenac sodium (VOLTAREN) 1 % Gel APPLY 2 GRAMS TOPICALLY TO THE AFFECTED AREA TWICE DAILY 100 g 1    docusate sodium (COLACE) 100 MG capsule Take 100 mg by mouth 2 (two) times daily.      EScitalopram oxalate (LEXAPRO) 20 MG tablet TAKE 1 TABLET(20 MG) BY MOUTH EVERY EVENING 90 tablet 3    fluticasone-umeclidin-vilanter (TRELEGY ELLIPTA) 100-62.5-25 mcg DsDv Inhale 1 puff into the lungs once daily. 180 each 3    furosemide (LASIX) 40 MG tablet TAKE 1 TABLET BY MOUTH TWICE DAILY 180 tablet 2    gabapentin (NEURONTIN) 300 MG capsule Take 1 capsule (300 mg total) by mouth 3 (three) times daily. 90 capsule 11    metoprolol tartrate (LOPRESSOR) 25 MG tablet Take 1 tablet (25 mg total) by mouth 2 (two) times daily. 180 tablet 3    multivitamin (THERAGRAN) per tablet Take 1 tablet by mouth daily as needed (immune support).      nortriptyline (PAMELOR) 25 MG capsule TAKE 1 CAPSULE(25 MG) BY MOUTH EVERY EVENING 90 capsule 3    omega-3 fatty acids/fish oil (FISH OIL-OMEGA-3 FATTY ACIDS) 300-1,000 mg capsule Take 1 capsule by mouth once daily.      pantoprazole (PROTONIX) 40 MG tablet TAKE 1 TABLET BY MOUTH TWICE DAILY 180 tablet 3    zinc acetate 50 mg (zinc) Cap Take 0.5 tablets by mouth daily as needed  (immune support).       No current facility-administered medications on file prior to visit.

## 2025-06-24 DIAGNOSIS — R35.0 URINARY FREQUENCY: ICD-10-CM

## 2025-06-24 NOTE — TELEPHONE ENCOUNTER
Refill Routing Note   Medication(s) are not appropriate for processing by [facility] for the following reason(s):        Outside of protocol  No active prescription written by provider    [facility] action(s):  Route   Requires labs : Yes        Medication Therapy Plan: PRN usage outside of [facility] protocol; Last ordered: 1 month ago (5/23/2025) by Amada Newman MD      Appointments  past 12m or future 3m with PCP    Date Provider   Last Visit   1/10/2025 Nuzhat Kline MD   Next Visit   6/27/2025 Nuzhat Kline MD   ED visits in past 90 days: 0        Note composed:5:41 PM 06/24/2025

## 2025-06-24 NOTE — TELEPHONE ENCOUNTER
Care Due:                  Date            Visit Type   Department     Provider  --------------------------------------------------------------------------------                                EP -                              [facility]  Last Visit: 05-      CARE ([facility])   MEDICINE       Amada Newman                               -                              [facility]  Next Visit: 06-      CARE ([facility])   MEDICINE       Nuzhat Kline                                                            Last  Test          Frequency    Reason                     Performed    Due Date  --------------------------------------------------------------------------------    Lipid Panel.  12 months..  atorvastatin.............  05- 04-    Uric Acid...  12 months..  allopurinoL..............  Not Found    Overdue    [employer] Embedded Care Due Messages. Reference number: [id number].   6/24/2025 4:52:40 PM CDT

## 2025-06-27 ENCOUNTER — OFFICE VISIT (OUTPATIENT)
Dept: FAMILY MEDICINE | Facility: CLINIC | Age: 80
End: 2025-06-27
Payer: MEDICARE

## 2025-06-27 VITALS
BODY MASS INDEX: 30.39 KG/M2 | SYSTOLIC BLOOD PRESSURE: 130 MMHG | HEIGHT: 62 IN | DIASTOLIC BLOOD PRESSURE: 80 MMHG | HEART RATE: 67 BPM | OXYGEN SATURATION: 84 % | WEIGHT: 165.13 LBS

## 2025-06-27 DIAGNOSIS — R73.03 PREDIABETES: ICD-10-CM

## 2025-06-27 DIAGNOSIS — R32 URINARY INCONTINENCE, UNSPECIFIED TYPE: Primary | ICD-10-CM

## 2025-06-27 DIAGNOSIS — Z79.899 HIGH RISK MEDICATION USE: ICD-10-CM

## 2025-06-27 DIAGNOSIS — E23.7 ABNORMALITY OF PITUITARY GLAND: ICD-10-CM

## 2025-06-27 DIAGNOSIS — N18.31 CHRONIC KIDNEY DISEASE, STAGE 3A: ICD-10-CM

## 2025-06-27 DIAGNOSIS — I70.0 ATHEROSCLEROSIS OF AORTA: ICD-10-CM

## 2025-06-27 PROCEDURE — 99999 PR PBB SHADOW E&M-EST. PATIENT-LVL IV: CPT | Mod: PBBFAC,,, | Performed by: FAMILY MEDICINE

## 2025-06-27 PROCEDURE — 1126F AMNT PAIN NOTED NONE PRSNT: CPT | Mod: CPTII,S$GLB,, | Performed by: FAMILY MEDICINE

## 2025-06-27 PROCEDURE — 3075F SYST BP GE 130 - 139MM HG: CPT | Mod: CPTII,S$GLB,, | Performed by: FAMILY MEDICINE

## 2025-06-27 PROCEDURE — 1159F MED LIST DOCD IN RCRD: CPT | Mod: CPTII,S$GLB,, | Performed by: FAMILY MEDICINE

## 2025-06-27 PROCEDURE — 1101F PT FALLS ASSESS-DOCD LE1/YR: CPT | Mod: CPTII,S$GLB,, | Performed by: FAMILY MEDICINE

## 2025-06-27 PROCEDURE — G2211 COMPLEX E/M VISIT ADD ON: HCPCS | Mod: S$GLB,,, | Performed by: FAMILY MEDICINE

## 2025-06-27 PROCEDURE — 99214 OFFICE O/P EST MOD 30 MIN: CPT | Mod: S$GLB,,, | Performed by: FAMILY MEDICINE

## 2025-06-27 PROCEDURE — 3288F FALL RISK ASSESSMENT DOCD: CPT | Mod: CPTII,S$GLB,, | Performed by: FAMILY MEDICINE

## 2025-06-27 PROCEDURE — 3079F DIAST BP 80-89 MM HG: CPT | Mod: CPTII,S$GLB,, | Performed by: FAMILY MEDICINE

## 2025-06-27 PROCEDURE — 1160F RVW MEDS BY RX/DR IN RCRD: CPT | Mod: CPTII,S$GLB,, | Performed by: FAMILY MEDICINE

## 2025-06-27 RX ORDER — GABAPENTIN 300 MG/1
300 CAPSULE ORAL NIGHTLY
Qty: 90 CAPSULE | Refills: 3 | Status: SHIPPED | OUTPATIENT
Start: 2025-07-04

## 2025-06-27 RX ORDER — OXYBUTYNIN CHLORIDE 5 MG/1
TABLET, EXTENDED RELEASE ORAL
Qty: 30 TABLET | Refills: 3 | Status: SHIPPED | OUTPATIENT
Start: 2025-06-27

## 2025-06-27 NOTE — PROGRESS NOTES
Chief Complaint:  Chief Complaint   Patient presents with    Follow-up        HPI:  Luda is a 80 y.o. year old     History of Present Illness    CHIEF COMPLAINT:  Luda presents today for follow up with concerns about urinary incontinence.    URINARY INCONTINENCE:  She reports significant urinary incontinence with frequent leakage occurring multiple times per day. She experiences involuntary urination when standing up from sitting and has difficulty making it to the bathroom, with leakage occurring even within short distances from bedroom to nearby areas. She currently uses the thickest available pads due to severity of incontinence. She notes that bladder control is notably better when outside the home. She describes her bladder feeling completely full with continued leakage even after voiding. She reports unpredictable and frequent urinary accidents that significantly impact daily functioning. These symptoms have been present for an extended period without clear resolution.    MUSCULOSKELETAL PAIN:  She reports ongoing right leg pain localized down the right leg that appears musculoskeletal in nature and is associated with buttock discomfort. She manages pain using Biofreeze topical analgesic, Voltaren gel, and naproxen. She is cautious about overusing strong topical medications and seeks to balance pain management approaches.    SLEEP:  She reports frequent daytime sleepiness with difficulty staying awake during activities. She experiences short sleep episodes, falling asleep for approximately half an hour at a time and then waking up. She uses oxygen while sleeping.    CURRENT MEDICATIONS:  She is currently taking Lasix 20 mg twice daily, Gabapentin daily, Nortriptyline at night for nerve pain, and Abilify 0.5 mg in the morning which was added to help stabilize mood and reduce emotional fluctuations.      ROS:  Genitourinary: +urinary incontinence, +excessive urination  Musculoskeletal: +limb pain, +body  "aches  Skin: +rash                 Past Medical History:  Past Medical History:   Diagnosis Date    Anticoagulant long-term use     Anxiety 09/26/2016    Back pain     Closed fracture of second lumbar vertebra with routine healing 12/07/2015    COPD (chronic obstructive pulmonary disease) 09/26/2016    Emphysema of lung     Encounter for blood transfusion     Gastric ulcer     Hypertension     Tibia/fibula fracture 01/24/2022         Vitals:  Vitals:    06/27/25 1116   BP: 130/80   Pulse: 67   SpO2: (!) 84%   Weight: 74.9 kg (165 lb 2 oz)   Height: 5' 2" (1.575 m)   PainSc: 0-No pain       BP Readings from Last 5 Encounters:   06/27/25 130/80   05/23/25 (!) 138/48   05/22/25 (!) 162/64   01/10/25 112/76   10/04/24 114/78       The ASCVD Risk score (Leny ALY, et al., 2019) failed to calculate for the following reasons:    The 2019 ASCVD risk score is only valid for ages 40 to 79      Physical Exam:  Physical Exam  Vitals and nursing note reviewed.   Constitutional:       General: She is not in acute distress.     Appearance: Normal appearance. She is well-developed.   HENT:      Head: Normocephalic and atraumatic.   Eyes:      General: No scleral icterus.        Right eye: No discharge.         Left eye: No discharge.      Conjunctiva/sclera: Conjunctivae normal.   Cardiovascular:      Rate and Rhythm: Normal rate and regular rhythm.   Pulmonary:      Effort: Pulmonary effort is normal. No respiratory distress.      Breath sounds: Decreased air movement present. Decreased breath sounds present.   Abdominal:      General: Bowel sounds are normal. There is no distension.      Palpations: Abdomen is soft.      Tenderness: There is no guarding.   Musculoskeletal:         General: Tenderness present. No deformity or signs of injury.      Cervical back: Neck supple. No rigidity.      Right lower leg: No edema.      Left lower leg: No edema.   Skin:     General: Skin is warm and dry.      Findings: No rash.   Neurological: "      General: No focal deficit present.      Mental Status: She is alert and oriented to person, place, and time.   Psychiatric:         Mood and Affect: Mood normal.         Behavior: Behavior normal.             Assessment & Plan:  Urinary incontinence, unspecified type  -     Urinalysis, Reflex to Urine Culture Urine, Clean Catch    Abnormality of pituitary gland  -     Hemoglobin A1C; Future; Expected date: 06/27/2025  -     CBC Auto Differential; Future; Expected date: 06/27/2025  -     Comprehensive metabolic panel; Future; Expected date: 06/27/2025  -     TSH; Future; Expected date: 06/27/2025    Prediabetes  -     Hemoglobin A1C; Future; Expected date: 06/27/2025  -     Vitamin B12; Future; Expected date: 06/27/2025  -     Iron; Future; Expected date: 06/27/2025  -     Lipid Panel; Future; Expected date: 06/27/2025    High risk medication use  -     TSH; Future; Expected date: 06/27/2025  -     Vitamin B12; Future; Expected date: 06/27/2025  -     Iron; Future; Expected date: 06/27/2025  -     Lipid Panel; Future; Expected date: 06/27/2025    Chronic kidney disease, stage 3a  -     Vitamin B12; Future; Expected date: 06/27/2025  -     Iron; Future; Expected date: 06/27/2025  -     Lipid Panel; Future; Expected date: 06/27/2025    Atherosclerosis of aorta  -     Vitamin B12; Future; Expected date: 06/27/2025  -     Iron; Future; Expected date: 06/27/2025  -     Lipid Panel; Future; Expected date: 06/27/2025    Other orders  -     gabapentin (NEURONTIN) 300 MG capsule; Take 1 capsule (300 mg total) by mouth every evening.  Dispense: 90 capsule; Refill: 3         Assessment & Plan    ORDERS:   Ordered routine labs to be updated at the end of summer/early fall.    IMPRESSION:  Assessed urinary incontinence symptoms and frequency.  Considered and started oxybutynin XL (lowest dose) daily for bladder control, can be used as needed or regularly based on effectiveness, weighing potential memory/balance side effects  against benefits.  Evaluated current medication regimen.  Reviewed lung function and COPD management.    PLAN SUMMARY:   Continue Lasix twice daily   Decrease gabapentin to daily   Continue nortriptyline at night only   Set timer or use reminders to urinate every 2 hours   Use oxygen as prescribed, especially during rest   Continue using compression stockings daily   Maintain current water intake   Ordered routine labs for late summer/early fall    PATIENT EDUCATION:   Explained bladder training concept and importance of regular voiding to prevent overfilling.   Discussed how COPD affects CO2 levels and breathing.    ACTION ITEMS/LIFESTYLE:   Luda to set a timer or use reminders to urinate every 2 hours at most.   Luda to continue using compression stockings daily.   Luda to maintain current water intake throughout the day.   Luda to use oxygen as prescribed, especially during rest periods.    MEDICATIONS:   Decreased gabapentin to daily.   Continued Lasix twice daily.   Continued nortriptyline at night only.         Visit today included increased complexity associated with the care of the episodic problem urinary freq addressed and managing the longitudinal care of the patient due to the serious and/or complex managed problem(s) ckd3, prediabetes, copd.    This note was generated with the assistance of ambient listening technology. Verbal consent was obtained by the patient and accompanying visitor(s) for the recording of patient appointment to facilitate this note. I attest to having reviewed and edited the generated note for accuracy, though some syntax or spelling errors may persist. Please contact the author of this note for any clarification.

## 2025-07-08 RX ORDER — ESCITALOPRAM OXALATE 20 MG/1
20 TABLET ORAL NIGHTLY
Qty: 90 TABLET | Refills: 3 | Status: SHIPPED | OUTPATIENT
Start: 2025-07-08

## 2025-07-08 NOTE — TELEPHONE ENCOUNTER
No care due was identified.  [facility] Embedded Care Due Messages. Reference number: [id number].   7/07/2025 4:43:24 PM CDT  
Refill Decision Note   Luda Fran  is requesting a refill authorization.  Brief Assessment and Rationale for Refill:  Approve     Medication Therapy Plan:         Comments:     Note composed:10:16 AM 07/08/2025               
Pt walked in c/o abd pain x2 days. Pt poor historian in triage. Denies fevers, chills, n/v/d.

## 2025-07-24 RX ORDER — NORTRIPTYLINE HYDROCHLORIDE 25 MG/1
25 CAPSULE ORAL NIGHTLY
Qty: 90 CAPSULE | Refills: 3 | Status: SHIPPED | OUTPATIENT
Start: 2025-07-24

## 2025-07-24 NOTE — TELEPHONE ENCOUNTER
No care due was identified.  [facility] Embedded Care Due Messages. Reference number: [id number].   7/24/2025 5:44:08 PM CDT

## 2025-07-25 NOTE — TELEPHONE ENCOUNTER
Refill Decision Note   Luda Fran  is requesting a refill authorization.  Brief Assessment and Rationale for Refill:  Approve     Medication Therapy Plan:        Comments:     Note composed:10:50 PM 07/24/2025

## 2025-08-17 RX ORDER — METOPROLOL TARTRATE 25 MG/1
25 TABLET, FILM COATED ORAL 2 TIMES DAILY
Qty: 180 TABLET | Refills: 3 | Status: SHIPPED | OUTPATIENT
Start: 2025-08-17

## 2025-08-25 ENCOUNTER — TELEPHONE (OUTPATIENT)
Dept: FAMILY MEDICINE | Facility: CLINIC | Age: 80
End: 2025-08-25
Payer: MEDICARE

## 2025-08-29 ENCOUNTER — TELEPHONE (OUTPATIENT)
Dept: FAMILY MEDICINE | Facility: CLINIC | Age: 80
End: 2025-08-29
Payer: MEDICARE

## (undated) DEVICE — APPLICATOR CHLORAPREP CLR 10.5

## (undated) DEVICE — GLOVE SURGICAL LATEX SZ 7

## (undated) DEVICE — TOWEL OR DISP STRL BLUE 4/PK

## (undated) DEVICE — TRAY NERVE BLOCK

## (undated) DEVICE — MARKER SKIN STND TIP BLUE BARR

## (undated) DEVICE — NDL SPINAL SPINOCAN 22GX3.5

## (undated) DEVICE — NDL TUOHY EPIDURAL 20G X 3.5

## (undated) DEVICE — SYR GLASS 5CC LUER LOK

## (undated) DEVICE — SEE MEDLINE ITEM 152622